# Patient Record
Sex: FEMALE | Race: WHITE | NOT HISPANIC OR LATINO | Employment: FULL TIME | URBAN - METROPOLITAN AREA
[De-identification: names, ages, dates, MRNs, and addresses within clinical notes are randomized per-mention and may not be internally consistent; named-entity substitution may affect disease eponyms.]

---

## 2017-06-04 ENCOUNTER — TRANSFERRED RECORDS (OUTPATIENT)
Dept: HEALTH INFORMATION MANAGEMENT | Facility: CLINIC | Age: 52
End: 2017-06-04

## 2017-06-22 DIAGNOSIS — F33.0 MAJOR DEPRESSIVE DISORDER, RECURRENT EPISODE, MILD (H): ICD-10-CM

## 2017-06-22 NOTE — TELEPHONE ENCOUNTER
sertraline (ZOLOFT) 100 MG tablet     Last Written Prescription Date: 9/8/16  Last Fill Quantity: 90, # refills: 1  Last Office Visit with [facility] primary care provider:  9/8/16        Last PHQ-9 score on record=   PHQ-9 SCORE 9/8/2016   Total Score -   Total Score 4

## 2017-06-26 RX ORDER — SERTRALINE HYDROCHLORIDE 100 MG/1
100 TABLET, FILM COATED ORAL DAILY
Qty: 30 TABLET | Refills: 0 | Status: SHIPPED | OUTPATIENT
Start: 2017-06-26 | End: 2017-08-25

## 2017-06-26 NOTE — TELEPHONE ENCOUNTER
Routing refill request to provider for review/approval because:  Patient needs to be seen because: overdue for mood follow up  Appears to be a break in medication - should have been out in March    Sugar Francois, RN, BSN

## 2017-07-12 DIAGNOSIS — I10 HTN, GOAL BELOW 140/80: ICD-10-CM

## 2017-07-12 DIAGNOSIS — I10 BENIGN HYPERTENSION: ICD-10-CM

## 2017-07-13 NOTE — TELEPHONE ENCOUNTER
hydrochlorothiazide (HYDRODIURIL) 25 MG tablet      Last Written Prescription Date: 11/28/16  Last Fill Quantity: 90, # refills: 1  Last Office Visit with [facility], [facility] or [facility] prescribing provider: 9/8/16       Potassium   Date Value Ref Range Status   06/08/2016 3.7 3.4 - 5.3 mmol/L Final     Creatinine   Date Value Ref Range Status   06/08/2016 0.90 0.52 - 1.04 mg/dL Final     BP Readings from Last 3 Encounters:   09/08/16 118/88   08/02/16 136/76   06/08/16 110/76

## 2017-07-14 ENCOUNTER — TELEPHONE (OUTPATIENT)
Dept: FAMILY MEDICINE | Facility: OTHER | Age: 52
End: 2017-07-14

## 2017-07-14 RX ORDER — HYDROCHLOROTHIAZIDE 25 MG/1
TABLET ORAL
Qty: 90 TABLET | Refills: 1 | Status: SHIPPED | OUTPATIENT
Start: 2017-07-14 | End: 2018-03-25

## 2017-07-14 NOTE — TELEPHONE ENCOUNTER
hydrochlorothiazide (HYDRODIURIL) 25 MG tablet  Routing refill request to provider for review/approval because:  Labs not current: BMP  A break in medication, should have needed refills around 05/2017  Patient needs to be seen because:  Over due for Depression follow up, due for physical     Jess Syed RN, BSN

## 2017-08-10 DIAGNOSIS — I10 HTN, GOAL BELOW 140/80: ICD-10-CM

## 2017-08-10 NOTE — TELEPHONE ENCOUNTER
Lisinopril 5 mg      Last Written Prescription Date: 6/8/2016  Last Fill Quantity: 90, # refills: 1  Last Office Visit with [facility], [facility] or [facility] prescribing provider: 9/8/2016       Potassium   Date Value Ref Range Status   06/08/2016 3.7 3.4 - 5.3 mmol/L Final     Creatinine   Date Value Ref Range Status   06/08/2016 0.90 0.52 - 1.04 mg/dL Final     BP Readings from Last 3 Encounters:   09/08/16 118/88   08/02/16 136/76   06/08/16 110/76

## 2017-08-14 RX ORDER — LISINOPRIL 5 MG/1
TABLET ORAL
Qty: 90 TABLET | Refills: 1 | OUTPATIENT
Start: 2017-08-14

## 2017-08-14 NOTE — TELEPHONE ENCOUNTER
I haven't seen since 2014 and last saw KW who is on leave and this was over a year ago.  Due for follow up.  Aminta Davis MD

## 2017-08-14 NOTE — TELEPHONE ENCOUNTER
Routing refill request to provider for review/approval because:  A break in medication. Patient should have been due around 12/6/16.  Brenna Ziegler RN

## 2017-08-24 ENCOUNTER — TELEPHONE (OUTPATIENT)
Dept: FAMILY MEDICINE | Facility: OTHER | Age: 52
End: 2017-08-24

## 2017-08-24 DIAGNOSIS — I10 HTN, GOAL BELOW 140/80: ICD-10-CM

## 2017-08-25 DIAGNOSIS — F33.0 MAJOR DEPRESSIVE DISORDER, RECURRENT EPISODE, MILD (H): ICD-10-CM

## 2017-08-25 NOTE — TELEPHONE ENCOUNTER
lisinopril (PRINIVIL,ZESTRIL) 5 MG tablet      Last Written Prescription Date: 11/28/16  Last Fill Quantity: 90, # refills: 1  Last Office Visit with [facility], [facility] or [facility] prescribing provider: 9/8/16 [place]       Potassium   Date Value Ref Range Status   06/08/2016 3.7 3.4 - 5.3 mmol/L Final     Creatinine   Date Value Ref Range Status   06/08/2016 0.90 0.52 - 1.04 mg/dL Final     BP Readings from Last 3 Encounters:   09/08/16 118/88   08/02/16 136/76   06/08/16 110/76

## 2017-08-25 NOTE — TELEPHONE ENCOUNTER
sertraline (ZOLOFT) 100 MG tablet     Last Written Prescription Date: 6/26/2017  Last Fill Quantity: 30, # refills: 0  Last Office Visit with [facility] primary care provider:  9/8/2016        Last PHQ-9 score on record=   PHQ-9 SCORE 9/8/2016   Total Score -   Total Score 4

## 2017-08-28 RX ORDER — LISINOPRIL 5 MG/1
TABLET ORAL
Qty: 90 TABLET | Refills: 1 | OUTPATIENT
Start: 2017-08-28

## 2017-08-28 NOTE — TELEPHONE ENCOUNTER
Already denied once.  Please review first if you want to call and get her scheduled.  Aminta Davis MD

## 2017-08-28 NOTE — TELEPHONE ENCOUNTER
Routing refill request to provider for review/approval because:  Joyce given x1 and patient did not follow up, please advise  Patient needs to be seen because:  Overdue for mood follow up    Sugar Francois, RN, BSN

## 2017-08-28 NOTE — TELEPHONE ENCOUNTER
Routing refill request to provider for review/approval because:  A break in medication. Refill should have been due on 5/28/17. Brenna Ziegler RN

## 2017-09-05 RX ORDER — SERTRALINE HYDROCHLORIDE 100 MG/1
100 TABLET, FILM COATED ORAL DAILY
Qty: 30 TABLET | Refills: 0 | Status: SHIPPED | OUTPATIENT
Start: 2017-09-05 | End: 2017-10-05

## 2017-10-02 DIAGNOSIS — I10 HTN, GOAL BELOW 140/80: ICD-10-CM

## 2017-10-02 RX ORDER — LISINOPRIL 5 MG/1
TABLET ORAL
Qty: 90 TABLET | Refills: 1 | Status: CANCELLED | OUTPATIENT
Start: 2017-10-02

## 2017-10-02 NOTE — TELEPHONE ENCOUNTER
lisinopril      Last Written Prescription Date: 11/28/16  Last Fill Quantity: 90, # refills: 1  Last Office Visit with [facility], [facility] or [facility] prescribing provider: 09/08/16  Next 5 appointments (look out 90 days)     Oct 05, 2017 12:00 PM CDT   Office Visit with Aminta Davis MD   [facility]    [address] [place] [id number] [phone]                   Potassium   Date Value Ref Range Status   06/08/2016 3.7 3.4 - 5.3 mmol/L Final     Creatinine   Date Value Ref Range Status   06/08/2016 0.90 0.52 - 1.04 mg/dL Final     BP Readings from Last 3 Encounters:   09/08/16 118/88   08/02/16 136/76   06/08/16 110/76

## 2017-10-03 NOTE — PROGRESS NOTES
SUBJECTIVE:                                                    Antoinette Sherwood is a 51 year old female who presents to clinic today for the following health issues:    History of Present Illness   Depression & Anxiety Follow-up:     Depression/Anxiety:  Depression only    Status since last visit::  Stable    Other associated symptoms of depression::  None    Significant life event::  YES    Current substance use::  None    Anxiety/Panic symptoms::  No  Diet::  Regular (no restrictions) and Low salt  Frequency of exercise::  2-3 days/week  Duration of exercise::  45-60 minutes  Taking medications regularly::  Yes  Medication side effects::  None  Additional concerns today::  No    Pt. A depression meds. F/u only.    Mood: patient reports good control of her mood at this time. The patient reports that she is having some sleep disturbances from her Zoloft.     General: the patient is working at [employer] going back and forth between [employer] and [place]. She does the infusion part for Crohn's and colitis.     Hypertension:  BP Readings from Last 3 Encounters:   10/05/17 118/70   09/08/16 118/88   08/02/16 136/76     The patient denies any side effects to her hypertension medications.     Lifestyle: the patient reports that she is exercising 3 times a week for about 1-1.5 hours.     Left Hip: the patient reports she has intermittent, sharp hip pain going from sitting to standing that radiates down her left leg. She reports that her pain is worst in the morning.     Problem list and histories reviewed & adjusted, as indicated.  Additional history: as documented    Patient Active Problem List   Diagnosis     CARDIOVASCULAR SCREENING; LDL GOAL LESS THAN 160     Mild major depression (H)     Benign hypertension     HTN, goal below 140/80     Pain of left lower leg     Migraine with aura and without status migrainosus, not intractable     Past Surgical History:   Procedure Laterality Date     C NONSPECIFIC  PROCEDURE  08/96    Left salpingectomy   Abstracted 07/01/02     [facility] AFF SURGICAL PATHOLOGY  2006    breast reduction     EXCISE LESION TRUNK  11/21/2012    Procedure: EXCISE LESION TRUNK;  Excision back mass;  Surgeon: Saqib Abraham MD;  Location: [facility] OR     HC CORRECT BUNION,METATARSAL OSTEOTOMY  12/03/09    right great toe     HC EXCISION LESION/TENDON-SHEATH/CAPSULE, FOOT  12/03/09     HC REMV TARSAL/METATARSAL BENIGN BONE LESN  12/03/09     HYSTERECTOMY, PAP NO LONGER INDICATED       HYSTERECTOMY, VAGINAL  2005    menorrhagia, normal paps     LAPAROSCOPIC CHOLECYSTECTOMY  2001     LITHOTRIPSY  2007    right kidney     TUBAL LIGATION  2001       Social History   Substance Use Topics     Smoking status: Never Smoker     Smokeless tobacco: Never Used     Alcohol use Yes      Comment: occ glass of wine     Family History   Problem Relation Age of Onset     Thyroid Disease Father      Hypertension Father      Lipids Father      Eye Disorder Mother      glaucoma     DIABETES Mother      Genitourinary Problems Mother      kidney stones     Asthma No family hx of      C.A.D. No family hx of      CEREBROVASCULAR DISEASE No family hx of      Breast Cancer No family hx of      Cancer - colorectal No family hx of      Prostate Cancer No family hx of      Alzheimer Disease No family hx of      Arthritis No family hx of      Blood Disease No family hx of      CANCER No family hx of      Cardiovascular No family hx of      Circulatory No family hx of      GASTROINTESTINAL DISEASE No family hx of      HEART DISEASE No family hx of      Musculoskeletal Disorder No family hx of      Neurologic Disorder No family hx of      Respiratory No family hx of          ROS:  Constitutional, HEENT, cardiovascular, pulmonary, GI, , musculoskeletal, neuro, skin, endocrine and psych systems are negative, except as in HPI or otherwise noted.     This document serves as a record of the services and decisions personally performed and made by  "Aminta Davis MD. It was created on her behalf by Edward Norman, a trained medical scribe. The creation of this document is based the provider's statements to the medical scribe.  Edward Norman, October 5, 2017 12:15 PM     OBJECTIVE:                                                    /70  Pulse 72  Temp 98  F (36.7  C) (Temporal)  Resp 16  Ht 1.67 m (5' 5.75\")  Wt 83 kg (183 lb)  LMP 07/01/2005  SpO2 97%  BMI 29.76 kg/m2  Body mass index is 29.76 kg/(m^2).     GENERAL: healthy, alert, well nourished, well hydrated, no distress, overweight  RESP: lungs clear to auscultation - no rales, no rhonchi, no wheezes  CV: regular rates and rhythm, normal S1 S2, no S3 or S4 and no murmur, no click or rub -  MS: extremities- no gross deformities noted, no edema (See Hip exams below)  SKIN: no suspicious lesions, no rashes to visible skin   PSYCH: Alert and oriented times 3; speech- coherent , normal rate and volume; able to articulate logical thoughts, able to abstract reason, no tangential thoughts, no hallucinations or delusions, affect- normal    Left Hip Exam   Gait: Normal.    Tenderness   The patient is experiencing tenderness in the greater trochanter.    Range of Motion   Extension:            Normal  Flexion:                 Normal  Internal Rotation:  Abnormal  External Rotation: Abnormal  Abduction:            Normal  Adduction:            Abnormal    Comments:  Int/Ext rotation painful at trochanter area.     Right Hip Exam   Gait: Normal.    Range of Motion   Normal right hip ROM    Muscle Strength   Normal right hip strength      Diagnostic test results:  No results found for this or any previous visit (from the past 24 hour(s)).     ASSESSMENT/PLAN:                                                      ICD-10-CM    1. HTN, goal below 140/80 I10 BASIC METABOLIC PANEL     lisinopril (PRINIVIL/ZESTRIL) 5 MG tablet   2. Need for prophylactic vaccination and inoculation against influenza Z23    3. Major " depressive disorder, recurrent episode, mild (H) F33.0 sertraline (ZOLOFT) 50 MG tablet     buPROPion (WELLBUTRIN XL) 150 MG 24 hr tablet   4. CARDIOVASCULAR SCREENING; LDL GOAL LESS THAN 160 Z13.6 Lipid panel reflex to direct LDL   5. Iliotibial band syndrome, left M76.32 Pain Drug Scr UR W Rptd Meds   6. Pain in joint involving pelvic region and thigh, right M25.551 traMADol (ULTRAM) 50 MG tablet     Mood: due to the patient's side effect of Zoloft dosage, will decrease Zoloft dosage from 100 mg to 50 mg and add Wellbutrin 150 mg to medication regiment.      Hypertension: the patient's blood pressure is well controlled at this time. Will continue current lisinopril regiment. Lisinopril refilled.      Left Hip pain: the patient is informed that her pain is related to a muscular problem in the IT band area, likely from overuse. She is advised to stretch and perform foam roller therapy the area to help the pain. If no relief, the patient is advised to pursue physical therapy. Patient is prescribed Tramadol, as needed, for hip pain. Controlled substance agreement discussed and signed by the patient. Order placed for Tramadol. Medication direction, dosage, and side effects discussed with patient.    Screenings: patient is encouraged to schedule her Q2 year mammogram soon.     Order placed for labs that the patient will complete at a later date.     Vaccination(s) administered: none, patient defers    Patient Instructions   3 month mood follow-up.     The information in this document, created by the medical scribe for me, accurately reflects the services I personally performed and the decisions made by me. I have reviewed and approved this document for accuracy.   MD Aminta Ortega MD, MD  [facility]

## 2017-10-05 ENCOUNTER — OFFICE VISIT (OUTPATIENT)
Dept: FAMILY MEDICINE | Facility: OTHER | Age: 52
End: 2017-10-05
Payer: COMMERCIAL

## 2017-10-05 VITALS
OXYGEN SATURATION: 97 % | RESPIRATION RATE: 16 BRPM | TEMPERATURE: 98 F | BODY MASS INDEX: 29.41 KG/M2 | HEART RATE: 72 BPM | HEIGHT: 66 IN | SYSTOLIC BLOOD PRESSURE: 118 MMHG | WEIGHT: 183 LBS | DIASTOLIC BLOOD PRESSURE: 70 MMHG

## 2017-10-05 DIAGNOSIS — M25.551 PAIN IN JOINT INVOLVING PELVIC REGION AND THIGH, RIGHT: ICD-10-CM

## 2017-10-05 DIAGNOSIS — Z13.6 CARDIOVASCULAR SCREENING; LDL GOAL LESS THAN 160: ICD-10-CM

## 2017-10-05 DIAGNOSIS — I10 HTN, GOAL BELOW 140/80: Primary | ICD-10-CM

## 2017-10-05 DIAGNOSIS — M76.32 ILIOTIBIAL BAND SYNDROME, LEFT: ICD-10-CM

## 2017-10-05 DIAGNOSIS — F33.0 MAJOR DEPRESSIVE DISORDER, RECURRENT EPISODE, MILD (H): ICD-10-CM

## 2017-10-05 DIAGNOSIS — Z23 NEED FOR PROPHYLACTIC VACCINATION AND INOCULATION AGAINST INFLUENZA: ICD-10-CM

## 2017-10-05 PROCEDURE — 99000 SPECIMEN HANDLING OFFICE-LAB: CPT | Performed by: FAMILY MEDICINE

## 2017-10-05 PROCEDURE — 99214 OFFICE O/P EST MOD 30 MIN: CPT | Performed by: FAMILY MEDICINE

## 2017-10-05 PROCEDURE — 80307 DRUG TEST PRSMV CHEM ANLYZR: CPT | Mod: 90 | Performed by: FAMILY MEDICINE

## 2017-10-05 RX ORDER — TRAMADOL HYDROCHLORIDE 50 MG/1
50 TABLET ORAL EVERY 6 HOURS PRN
Qty: 20 TABLET | Refills: 0 | Status: SHIPPED | OUTPATIENT
Start: 2017-10-05 | End: 2018-02-26

## 2017-10-05 RX ORDER — LISINOPRIL 5 MG/1
5 TABLET ORAL DAILY
Qty: 90 TABLET | Refills: 3 | Status: SHIPPED | OUTPATIENT
Start: 2017-10-05 | End: 2018-09-23

## 2017-10-05 RX ORDER — BUPROPION HYDROCHLORIDE 150 MG/1
150 TABLET ORAL EVERY MORNING
Qty: 30 TABLET | Refills: 1 | Status: SHIPPED | OUTPATIENT
Start: 2017-10-05 | End: 2018-02-21

## 2017-10-05 ASSESSMENT — PATIENT HEALTH QUESTIONNAIRE - PHQ9
SUM OF ALL RESPONSES TO PHQ QUESTIONS 1-9: 1
SUM OF ALL RESPONSES TO PHQ QUESTIONS 1-9: 1

## 2017-10-05 ASSESSMENT — ANXIETY QUESTIONNAIRES
GAD7 TOTAL SCORE: 0
6. BECOMING EASILY ANNOYED OR IRRITABLE: NOT AT ALL
GAD7 TOTAL SCORE: 0
7. FEELING AFRAID AS IF SOMETHING AWFUL MIGHT HAPPEN: NOT AT ALL
7. FEELING AFRAID AS IF SOMETHING AWFUL MIGHT HAPPEN: NOT AT ALL
4. TROUBLE RELAXING: NOT AT ALL
1. FEELING NERVOUS, ANXIOUS, OR ON EDGE: NOT AT ALL
3. WORRYING TOO MUCH ABOUT DIFFERENT THINGS: NOT AT ALL
GAD7 TOTAL SCORE: 0
5. BEING SO RESTLESS THAT IT IS HARD TO SIT STILL: NOT AT ALL
2. NOT BEING ABLE TO STOP OR CONTROL WORRYING: NOT AT ALL

## 2017-10-05 ASSESSMENT — PAIN SCALES - GENERAL: PAINLEVEL: NO PAIN (0)

## 2017-10-05 NOTE — LETTER
[facility]    10/05/17    Patient: Antoinette Sherwood  YOB: 1965  Medical Record Number: [id number]                                                                  Controlled Substance Agreement  I understand that my care provider has prescribed controlled substances (narcotics, tranquilizers, and/or stimulants) to help manage my condition(s).  I am taking this medicine to help me function or work.  I know that this is strong medicine.  It could have serious side effects and even cause a dependency on the drug.  If I stop these medicines suddenly, I could have severe withdrawal symptoms.    The risks, benefits, and side effects of these medication(s) were explained to me.  I agree that:  1. I will take part in other treatments as advised by my provider.  This may be psychiatry or counseling, physical therapy, behavioral therapy, group treatment, or a referral to a pain clinic.  I will reduce or stop my medicine when my provider tells me to do so.   2. I will take my medicines as prescribed.  I will not change the dose or schedule unless my provider tells me to.  There will be no refills if I  run out early.   I may be contacted at any time without warning and asked to complete a drug test or pill count.   3. I will keep all my appointments at the clinic.  If I miss appointments or fail to follow instructions, my provider may stop my medicine.  4. I will not ask other providers to prescribe controlled substances. And I will not accept controlled substances from other people. If I need another prescribed controlled substance for a new reason, I will notify my provider within one business day.  5. If I enroll in the [place] Medical Marijuana program, I will tell my provider.  I will also sign an agreement to share my medical records with my provider.  6. I will use one pharmacy to fill all of my controlled substance prescriptions.  If my prescription is mailed to my pharmacy, it  may take 5 to 7 days for my medicine to be ready.  7. I understand that my provider, clinic care team, and pharmacy can track controlled substance prescriptions from other providers through a central database (prescription monitoring program).  8. I will bring in my list of medications (or my medicine bottles) each time I come to the clinic.  REV-  04/2016                                                                                                                                            Page 1 of 2      [facility] [place]    10/05/17    Patient: Antoinette Sherwood  YOB: 1965  Medical Record Number: [id number]    9. Refills of controlled substances will be made only during office hours.  It is up to me to make sure that I do not run out of my medicines on weekends or holidays.    10. I am responsible for my prescriptions.  If the medicine is lost or stolen, it will not be replaced.   I also agree not to share these medicines with anyone.  11. I agree to not use ANY illegal or recreational drugs.  This includes marijuana, cocaine, bath salts or other drugs.  I agree not to use alcohol unless my provider says I may.  I agree to give urine samples whenever asked.  If I fail to give a urine sample, the provider may stop my medicine.     12. I will tell my nurse or provider right away if I become pregnant or have a new medical problem treated outside of [facility].  13. I understand that this medicine can affect my thinking and judgment.  It may be unsafe for me to drive, use machinery and do dangerous tasks.  I will not do any of these things until I know how the medicine affects me.  If my dose changes, I will wait to see how it affects me.  I will contact my provider if I have concerns about medicine side effects.  I understand that if I do not follow any of the conditions above, my prescriptions or treatment may be stopped.    I agree that my provider, clinic care team, and  pharmacy may work with any city, state or federal law enforcement agency that investigates the misuse, sale, or other diversion of my controlled medicine. I will allow my provider to discuss my care with or share a copy of this agreement with any other treating provider, pharmacy or emergency room where I receive care.  I agree to give up (waive) any right of privacy or confidentiality with respect to these authorizations.   I have read this agreement and have asked questions about anything I did not understand.   ___________________________________    ___________________________  Patient Signature                                                           Date and Time  ___________________________________     ____________________________  Witness                                                                            Date and Time  ___________________________________  Aminta Davis MD, MD  REV-  04/2016                                                                                                                                                                 Page 2 of 2  Opioid Pain Medicines (also known as Narcotics)  What You Need to Know      What are opioids?   Opioids are pain medicines that must be prescribed by a doctor. Examples are:     morphine (MS Contin, Naomie)    oxycodone (Oxycontin)    oxycodone and acetaminophen (Percocet)    hydrocodone and acetaminophen (Vicodin, Norco)     fentanyl patch (Duragesic)     hydromorphone (Dilaudid)     methadone     What do opioids do well?   Opioids are best for short-term pain after a surgery or injury. They also work well for cancer pain. Unlike other pain medicines, they do not cause liver or kidney failure or ulcers. They may help some people with long-lasting (chronic) pain.     What do opioids NOT do well?   Opioids never get rid of pain entirely, and they do not work well for most patients with chronic pain. Opioids do not reduce swelling, one of the causes  of pain. They also don t work well for nerve pain.     Side effects  Talk to your doctor before you start or decide to keep taking one of these medicines. Side effects include:    Lowers your breathing rate enough that it could cause death    Death due to taking more than the prescribed dose    Serious lifelong opioid use      Dependence is not the same as addiction. Addiction is when people keep using a substance that harms their body, their mind or their relations with others. If you have a history of drug or alcohol abuse, taking opioids can cause a relapse.  Over time, opioids don t work as well. Most people will need higher and higher doses. The higher the dose, the more serious the side effects. We don t know the long-term effects of opioids.   People who have used opioids for a long time have a lower quality of life, worse depression, higher levels of pain and more visits to doctors.  Overdose from prescription drugs is the second leading cause of death in the [place] The risk of overdose rises when opioids are taken with other drugs such as:    Medicines used for anxiety and panic attacks (such as lorazepam, alprazolam, clonazepam    Other sedatives    Alcohol    Illegal drugs such as heroin  Never share your opioids with others. Be sure to store opioids in a secure place, locked if possible.Young children can easily swallow them and overdose.     Are there other ways to manage pain?  Ways to help reduce pain:    Exercise every day.    Treat health problems that may be causing pain.    Treat mental health problems like depression and anxiety.     Worse depression symptoms; Less pleasure in things you usually enjoy    Feeling tired or sluggish    Slower thoughts or cloudy thinking    Being more sensitive to pain over time; Pain is harder to control.    Trouble sleeping or restless sleep    Changes in hormone levels (for example, less testosterone).     Changes in sex drive or ability to have sex    Long lasting  nausea and constipation    Trouble breathing while asleep; This is worse with lung problems like COPD or sleep apnea.    Unsafe driving    Getting sick more often    Itching    Feeling dizzy    Dry mouth    Sweating    Trouble emptying the bladder (peeing). This is worse if you have an enlarged prostate or get urinary tract infections (UTIs).    What else should I know about opioids?  When someone takes opioids for too long or too often, they become dependent. This means that if you stop or reduce the medicine too quickly, you will have withdrawal symptoms.          Practice good sleep habits.  Try to go to bed and get up at the same time every day.    Stop smoking.  Tobacco use can make pain worse.    Do things that you enjoy.    Find a way to work through pain without drugs.  Try deep breathing, meditation, visual imagery and aromatherapy.    Ask your doctor to help you create a plan to manage your pain.

## 2017-10-05 NOTE — TELEPHONE ENCOUNTER
lisinopril (PRINIVIL,ZESTRIL) 5 MG tablet  Routing refill request to provider for review/approval because:  Labs not current:  Potassium, creatinine  A break in medication, should have needed refills around 06/2017  Patient needs to be seen because:  Due for OV, scheduled for today.  Next 5 appointments (look out 90 days)     Oct 05, 2017 12:00 PM CDT   Office Visit with Aminta Davis MD   [facility]    [address] [place] [id number]   [phone]                Medication pended to OV. Jess Syed, RN, BSN

## 2017-10-05 NOTE — NURSING NOTE
"Chief Complaint   Patient presents with     Recheck Medication     Panel Management     flu, DAP, PHQ9, BMP       Initial BP (!) 118/17  Pulse 72  Temp 98  F (36.7  C) (Temporal)  Resp 16  Ht 5' 5.75\" (1.67 m)  Wt 183 lb (83 kg)  LMP 07/01/2005  SpO2 97%  BMI 29.76 kg/m2 Estimated body mass index is 29.76 kg/(m^2) as calculated from the following:    Height as of this encounter: 5' 5.75\" (1.67 m).    Weight as of this encounter: 183 lb (83 kg).  Medication Reconciliation: complete   Adam Kahn      "

## 2017-10-05 NOTE — LETTER
My Depression Action Plan  Name: Antoinette Sherwood   Date of Birth 1965  Date: 10/3/2017    My doctor: No primary care provider on file.   My clinic: [address]  [facility] [id number] [phone]          GREEN    ZONE   Good Control    What it looks like:     Things are going generally well. You have normal up s and down s. You may even feel depressed from time to time, but bad moods usually last less than a day.   What you need to do:  1. Continue to care for yourself (see self care plan)  2. Check your depression survival kit and update it as needed  3. Follow your physician s recommendations including any medication.  4. Do not stop taking medication unless you consult with your physician first.           YELLOW         ZONE Getting Worse    What it looks like:     Depression is starting to interfere with your life.     It may be hard to get out of bed; you may be starting to isolate yourself from others.    Symptoms of depression are starting to last most all day and this has happened for several days.     You may have suicidal thoughts but they are not constant.   What you need to do:     1. Call your care team, your response to treatment will improve if you keep your care team informed of your progress. Yellow periods are signs an adjustment may need to be made.     2. Continue your self-care, even if you have to fake it!    3. Talk to someone in your support network    4. Open up your depression survival kit           RED    ZONE Medical Alert - Get Help    What it looks like:     Depression is seriously interfering with your life.     You may experience these or other symptoms: You can t get out of bed most days, can t work or engage in other necessary activities, you have trouble taking care of basic hygiene, or basic responsibilities, thoughts of suicide or death that will not go away, self-injurious behavior.     What you need to  do:  1. Call your care team and request a same-day appointment. If they are not available (weekends or after hours) call your local crisis line, emergency room or 911.      Electronically signed by: Mary Kay Ring, October 3, 2017    Depression Self Care Plan / Survival Kit    Self-Care for Depression  Here s the deal. Your body and mind are really not as separate as most people think.  What you do and think affects how you feel and how you feel influences what you do and think. This means if you do things that people who feel good do, it will help you feel better.  Sometimes this is all it takes.  There is also a place for medication and therapy depending on how severe your depression is, so be sure to consult with your medical provider and/ or Behavioral Health Consultant if your symptoms are worsening or not improving.     In order to better manage my stress, I will:    Exercise  Get some form of exercise, every day. This will help reduce pain and release endorphins, the  feel good  chemicals in your brain. This is almost as good as taking antidepressants!  This is not the same as joining a gym and then never going! (they count on that by the way ) It can be as simple as just going for a walk or doing some gardening, anything that will get you moving.      Hygiene   Maintain good hygiene (Get out of bed in the morning, Make your bed, Brush your teeth, Take a shower, and Get dressed like you were going to work, even if you are unemployed).  If your clothes don't fit try to get ones that do.    Diet  I will strive to eat foods that are good for me, drink plenty of water, and avoid excessive sugar, caffeine, alcohol, and other mood-altering substances.  Some foods that are helpful in depression are: complex carbohydrates, B vitamins, flaxseed, fish or fish oil, fresh fruits and vegetables.    Psychotherapy  I agree to participate in Individual Therapy (if recommended).    Medication  If prescribed medications, I agree  to take them.  Missing doses can result in serious side effects.  I understand that drinking alcohol, or other illicit drug use, may cause potential side effects.  I will not stop my medication abruptly without first discussing it with my provider.    Staying Connected With Others  I will stay in touch with my friends, family members, and my primary care provider/team.    Use your imagination  Be creative.  We all have a creative side; it doesn t matter if it s oil painting, sand castles, or mud pies! This will also kick up the endorphins.    Witness Beauty  (AKA stop and smell the roses) Take a look outside, even in mid-winter. Notice colors, textures. Watch the squirrels and birds.     Service to others  Be of service to others.  There is always someone else in need.  By helping others we can  get out of ourselves  and remember the really important things.  This also provides opportunities for practicing all the other parts of the program.    Humor  Laugh and be silly!  Adjust your TV habits for less news and crime-drama and more comedy.    Control your stress  Try breathing deep, massage therapy, biofeedback, and meditation. Find time to relax each day.     My support system    Clinic Contact:  Phone number:    Contact 1:  Phone number:    Contact 2:  Phone number:    Confucianist/:  Phone number:    Therapist:  Phone number:    Local crisis center:    Phone number:    Other community support:  Phone number:

## 2017-10-05 NOTE — MR AVS SNAPSHOT
After Visit Summary   10/5/2017    Antoinette Sherwood    MRN: [id number]           Patient Information     Date Of Birth          1965        Visit Information        Provider Department      10/5/2017 12:00 PM Aminta Davis MD [facility]        Today's Diagnoses     HTN, goal below 140/80    -  1    Need for prophylactic vaccination and inoculation against influenza        Major depressive disorder, recurrent episode, mild (H)        CARDIOVASCULAR SCREENING; LDL GOAL LESS THAN 160        Iliotibial band syndrome, left        Pain in joint involving pelvic region and thigh, right          Care Instructions    3 month mood follow-up.           Follow-ups after your visit        Follow-up notes from your care team     Return in about 3 months (around 1/5/2018) for Mood Check.      Future tests that were ordered for you today     Open Future Orders        Priority Expected Expires Ordered    Lipid panel reflex to direct LDL Routine  11/5/2017 10/5/2017    BASIC METABOLIC PANEL Routine  11/5/2017 10/5/2017            Who to contact     If you have questions or need follow up information about today's clinic visit or your schedule please contact [facility] directly at [phone].  Normal or non-critical lab and imaging results will be communicated to you by Tuicoolhart, letter or phone within 4 business days after the clinic has received the results. If you do not hear from us within 7 days, please contact the clinic through eReceiptst or phone. If you have a critical or abnormal lab result, we will notify you by phone as soon as possible.  Submit refill requests through Switchfly or call your pharmacy and they will forward the refill request to us. Please allow 3 business days for your refill to be completed.          Additional Information About Your Visit        Tuicoolhart Information     Switchfly gives you secure access to your electronic health record. If you see a  "primary care provider, you can also send messages to your care team and make appointments. If you have questions, please call your primary care clinic.  If you do not have a primary care provider, please call [phone] and they will assist you.        Care EveryWhere ID     This is your Care EveryWhere ID. This could be used by other organizations to access your [place] medical records  [id number]        Your Vitals Were     Pulse Temperature Respirations Height Last Period Pulse Oximetry    72 98  F (36.7  C) (Temporal) 16 5' 5.75\" (1.67 m) 07/01/2005 97%    BMI (Body Mass Index)                   29.76 kg/m2            Blood Pressure from Last 3 Encounters:   10/05/17 118/70   09/08/16 118/88   08/02/16 136/76    Weight from Last 3 Encounters:   10/05/17 183 lb (83 kg)   09/08/16 180 lb 4.8 oz (81.8 kg)   08/02/16 180 lb 8 oz (81.9 kg)              We Performed the Following     Pain Drug Scr UR W Rptd Meds          Today's Medication Changes          These changes are accurate as of: 10/5/17 12:42 PM.  If you have any questions, ask your nurse or doctor.               Start taking these medicines.        Dose/Directions    buPROPion 150 MG 24 hr tablet   Commonly known as:  WELLBUTRIN XL   Used for:  Major depressive disorder, recurrent episode, mild (H)   Started by:  Aminta Davis MD        Dose:  150 mg   Take 1 tablet (150 mg) by mouth every morning   Quantity:  30 tablet   Refills:  1         These medicines have changed or have updated prescriptions.        Dose/Directions    lisinopril 5 MG tablet   Commonly known as:  PRINIVIL/ZESTRIL   This may have changed:  See the new instructions.   Used for:  HTN, goal below 140/80   Changed by:  Aminta Davis MD        Dose:  5 mg   Take 1 tablet (5 mg) by mouth daily   Quantity:  90 tablet   Refills:  3       sertraline 50 MG tablet   Commonly known as:  ZOLOFT   This may have changed:    - medication strength  - how much to take  - additional " instructions   Used for:  Major depressive disorder, recurrent episode, mild (H)   Changed by:  Aminta Davis MD        Dose:  50 mg   Take 1 tablet (50 mg) by mouth daily   Quantity:  90 tablet   Refills:  0            Where to get your medicines      These medications were sent to [place] [id number] [place] [facility] [place] [facility] [address]  [address], [facility] [address]     Phone:  [phone]     buPROPion 150 MG 24 hr tablet    lisinopril 5 MG tablet    sertraline 50 MG tablet         Some of these will need a paper prescription and others can be bought over the counter.  Ask your nurse if you have questions.     Bring a paper prescription for each of these medications     traMADol 50 MG tablet                Primary Care Provider Office Phone # Fax #    Aminta Davis [phone]       [address] [place] [address]        Equal Access to Services     [facility]: Hadii destinee bahena hadasho Soventura, waaxda luqadaha, qaybta kaalmada adeemelyyada, jame sifuentes . So [facility] [phone].    ATENCIÓN: Si habla español, tiene a palacios disposición servicios gratuitos de asistencia lingüística. Llame al [phone].    We comply with applicable federal civil rights laws and [place] laws. We do not discriminate on the basis of race, color, national origin, age, disability, sex, sexual orientation, or gender identity.            Thank you!     Thank you for choosing [facility]  for your care. Our goal is always to provide you with excellent care. Hearing back from our patients is one way we can continue to improve our services. Please take a few minutes to complete the written survey that you may receive in the mail after your visit with us. Thank you!             Your Updated Medication List - Protect others around you: Learn how to safely use, store and throw away your medicines at www.disposemymeds.org.          This list is accurate as of: 10/5/17 12:42 PM.   Always use your most recent med list.                   Brand Name Dispense Instructions for use Diagnosis    aspirin 81 MG chewable tablet     1 tablet    Take 1 tablet (81 mg) by mouth daily    Benign essential hypertension       buPROPion 150 MG 24 hr tablet    WELLBUTRIN XL    30 tablet    Take 1 tablet (150 mg) by mouth every morning    Major depressive disorder, recurrent episode, mild (H)       CALCIUM 600 + D 600-200 MG-UNIT Tabs     3 MONTHS    takes two tabs per day    Routine gynecological examination       CENTRUM SILVER per tablet      1 TABLET DAILY        cholecalciferol 1000 UNIT tablet    vitamin D     Take 2 tablets by mouth daily.        hydrochlorothiazide 25 MG tablet    HYDRODIURIL    90 tablet    TAKE 1 TABLET (25 MG) BY MOUTH DAILY    HTN, goal below 140/80, Benign hypertension       ibuprofen 600 MG tablet    IBU    60 tablet    Take 1 tablet by mouth every 6 hours as needed for pain.    Migraine       lisinopril 5 MG tablet    PRINIVIL/ZESTRIL    90 tablet    Take 1 tablet (5 mg) by mouth daily    HTN, goal below 140/80       magnesium 500 MG Tabs      Take  by mouth.        melatonin 3 MG tablet     90 tablet    Take 2 tablets (6 mg) by mouth nightly as needed for sleep    Insomnia       OMEGA-3 CF 1000 MG Caps       Routine gynecological examination       PEPCID AC PO      None Entered        rizatriptan 10 MG tablet    MAXALT    18 tablet    Take 1 tablet (10 mg) by mouth at onset of headache for migraine May repeat dose in 2 hours.  Do not exceed 30 mg in 24 hours    Migraine with aura and with status migrainosus, not intractable       sertraline 50 MG tablet    ZOLOFT    90 tablet    Take 1 tablet (50 mg) by mouth daily    Major depressive disorder, recurrent episode, mild (H)       traMADol 50 MG tablet    ULTRAM    20 tablet    Take 1 tablet (50 mg) by mouth every 6 hours as needed for moderate pain    Pain in joint involving pelvic region and thigh, right       vitamin E 1000 UNIT  capsule    TOCOPHEROL     Take 3 capsules by mouth daily.

## 2017-10-06 ASSESSMENT — PATIENT HEALTH QUESTIONNAIRE - PHQ9: SUM OF ALL RESPONSES TO PHQ QUESTIONS 1-9: 1

## 2017-10-06 ASSESSMENT — ANXIETY QUESTIONNAIRES: GAD7 TOTAL SCORE: 0

## 2017-10-09 DIAGNOSIS — I10 HTN, GOAL BELOW 140/80: ICD-10-CM

## 2017-10-09 DIAGNOSIS — Z13.6 CARDIOVASCULAR SCREENING; LDL GOAL LESS THAN 160: ICD-10-CM

## 2017-10-09 LAB
ANION GAP SERPL CALCULATED.3IONS-SCNC: 13 MMOL/L (ref 3–14)
BUN SERPL-MCNC: 20 MG/DL (ref 7–30)
CALCIUM SERPL-MCNC: 8.9 MG/DL (ref 8.5–10.1)
CHLORIDE SERPL-SCNC: 103 MMOL/L (ref 94–109)
CHOLEST SERPL-MCNC: 192 MG/DL
CO2 SERPL-SCNC: 29 MMOL/L (ref 20–32)
CREAT SERPL-MCNC: 0.92 MG/DL (ref 0.52–1.04)
GFR SERPL CREATININE-BSD FRML MDRD: 64 ML/MIN/1.7M2
GLUCOSE SERPL-MCNC: 92 MG/DL (ref 70–99)
HDLC SERPL-MCNC: 57 MG/DL
LDLC SERPL CALC-MCNC: 118 MG/DL
NONHDLC SERPL-MCNC: 135 MG/DL
POTASSIUM SERPL-SCNC: 4 MMOL/L (ref 3.4–5.3)
SODIUM SERPL-SCNC: 145 MMOL/L (ref 133–144)
TRIGL SERPL-MCNC: 87 MG/DL

## 2017-10-09 PROCEDURE — 36415 COLL VENOUS BLD VENIPUNCTURE: CPT | Performed by: FAMILY MEDICINE

## 2017-10-09 PROCEDURE — 80048 BASIC METABOLIC PNL TOTAL CA: CPT | Performed by: FAMILY MEDICINE

## 2017-10-09 PROCEDURE — 80061 LIPID PANEL: CPT | Performed by: FAMILY MEDICINE

## 2017-10-10 NOTE — PROGRESS NOTES
Antoinette, your results were all looking good.   Please let me know if you have any questions.    Aminta Davis MD

## 2017-10-13 LAB — PAIN DRUG SCR UR W RPTD MEDS: NORMAL

## 2017-10-16 DIAGNOSIS — M54.50 LOW BACK PAIN: ICD-10-CM

## 2017-10-16 PROCEDURE — 72100 X-RAY EXAM L-S SPINE 2/3 VWS: CPT

## 2018-01-20 DIAGNOSIS — F33.0 MAJOR DEPRESSIVE DISORDER, RECURRENT EPISODE, MILD (H): ICD-10-CM

## 2018-01-23 NOTE — TELEPHONE ENCOUNTER
Zoloft    Routing refill request to provider for review/approval because:  Patient needs to be seen because:  For 3 month Mood follow-up, please help pt schedule.    Anna Hansen, RN, BSN

## 2018-01-23 NOTE — TELEPHONE ENCOUNTER
Joyce refill given. Please assist patient in scheduling.     Ike Lora PA-C  [facility]

## 2018-02-14 NOTE — PROGRESS NOTES
SUBJECTIVE:   Antoinette COYLE Clayton Sherwood is a 52 year old female who presents to clinic today for the following health issues:      History of Present Illness     Depression & Anxiety Follow-up:     Depression/Anxiety:  Depression only    Status since last visit::  Stable    Other associated symptoms of depression::  None    Significant life event::  No    Current substance use::  None    Anxiety/Panic symptoms::  No       Today's PHQ-9         PHQ-9 Total Score:     (P) 3   PHQ-9 Q9 Suicidal ideation:   (P) Not at all   Thoughts of suicide or self harm:      Self-harm Plan:        Self-harm Action:          Safety concerns for self or others:       LESLIE-7 Total Score: (P) 0    Patient states she has discontinued using Wellbutrin would like to increase Zoloft from 50 mg daily back to 100 mg daily.  She initially felt her sleeping issues were due to Zoloft side effects but states that her symptoms continued after change of medication.     Problem list and histories reviewed & adjusted, as indicated.  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 so 3 days a week pt wakes at  4 45am     Accompanying signs and symptoms:  restless legs    History  Similar episodes in past:  YES  Previous evaluation/sleep study:  no     Precipitating or alleviating factors:  New stressful situation: no   Caffeine intake after lunchtime: YES- sometimes   OTC decongestants: no   Any new medications: no     Therapies tried and outcome: melatonin       BP Readings from Last 3 Encounters:   18 116/70   10/05/17 118/70   16 118/88    Wt Readings from Last 3 Encounters:   18 183 lb (83 kg)   10/05/17 183 lb (83 kg)   16 180 lb 4.8 oz (81.8 kg)                  Labs reviewed in EPIC    ROS:  Constitutional, HEENT,  "cardiovascular, pulmonary, gi and gu systems are negative, except as otherwise noted.    OBJECTIVE:     /70 (BP Location: Left arm, Patient Position: Chair, Cuff Size: Adult Large)  Pulse 68  Temp 98  F (36.7  C) (Oral)  Resp 16  Ht 5' 5.75\" (1.67 m)  Wt 183 lb (83 kg)  LMP 07/01/2005  BMI 29.76 kg/m2  Body mass index is 29.76 kg/(m^2).  GENERAL: healthy, alert and no distress  RESP: lungs clear to auscultation - no rales, rhonchi or wheezes  CV: regular rate and rhythm, normal S1 S2, no S3 or S4, no murmur, click or rub, no peripheral edema and peripheral pulses strong  PSYCH: mentation appears normal, affect normal/bright    ASSESSMENT/PLAN:     1. Major depressive disorder, recurrent episode, mild (H)  Increased zoloft to 100 mg from 50 mg daily.   - sertraline (ZOLOFT) 50 MG tablet; Take 2 tablets (100 mg) by mouth daily  Dispense: 90 tablet; Refill: 3    2. Persistent insomnia  Side effects of medications, proper use, the associated risk/benefits and other options were discussed. Patient understands these risks and agrees to take the medication as instructed.     - traZODone (DESYREL) 50 MG tablet; Take 1 tablet (50 mg) by mouth nightly as needed for sleep  Dispense: 90 tablet; Refill: 3    Patient Instructions   Please follow up with PCP in 6 months. Call if you need refill of trazodone.     Thank you  Sherry Yepez [facility]  Answers for HPI/ROS submitted by the patient on 2/21/2018   PHQ9 TOTAL SCORE: 3  LESLIE 7 TOTAL SCORE: 0    "

## 2018-02-21 ENCOUNTER — OFFICE VISIT (OUTPATIENT)
Dept: FAMILY MEDICINE | Facility: OTHER | Age: 53
End: 2018-02-21
Payer: COMMERCIAL

## 2018-02-21 VITALS
BODY MASS INDEX: 29.41 KG/M2 | SYSTOLIC BLOOD PRESSURE: 116 MMHG | WEIGHT: 183 LBS | DIASTOLIC BLOOD PRESSURE: 70 MMHG | RESPIRATION RATE: 16 BRPM | TEMPERATURE: 98 F | HEIGHT: 66 IN | HEART RATE: 68 BPM

## 2018-02-21 DIAGNOSIS — G47.00 PERSISTENT INSOMNIA: ICD-10-CM

## 2018-02-21 DIAGNOSIS — F33.0 MAJOR DEPRESSIVE DISORDER, RECURRENT EPISODE, MILD (H): Primary | ICD-10-CM

## 2018-02-21 PROCEDURE — 99213 OFFICE O/P EST LOW 20 MIN: CPT | Performed by: NURSE PRACTITIONER

## 2018-02-21 RX ORDER — TRAZODONE HYDROCHLORIDE 50 MG/1
50 TABLET, FILM COATED ORAL
Qty: 90 TABLET | Refills: 3 | Status: SHIPPED | OUTPATIENT
Start: 2018-02-21 | End: 2018-10-12

## 2018-02-21 ASSESSMENT — ANXIETY QUESTIONNAIRES
4. TROUBLE RELAXING: NOT AT ALL
3. WORRYING TOO MUCH ABOUT DIFFERENT THINGS: NOT AT ALL
1. FEELING NERVOUS, ANXIOUS, OR ON EDGE: NOT AT ALL
GAD7 TOTAL SCORE: 0
GAD7 TOTAL SCORE: 0
7. FEELING AFRAID AS IF SOMETHING AWFUL MIGHT HAPPEN: NOT AT ALL
5. BEING SO RESTLESS THAT IT IS HARD TO SIT STILL: NOT AT ALL
GAD7 TOTAL SCORE: 0
6. BECOMING EASILY ANNOYED OR IRRITABLE: NOT AT ALL
7. FEELING AFRAID AS IF SOMETHING AWFUL MIGHT HAPPEN: NOT AT ALL
2. NOT BEING ABLE TO STOP OR CONTROL WORRYING: NOT AT ALL

## 2018-02-21 ASSESSMENT — PATIENT HEALTH QUESTIONNAIRE - PHQ9
SUM OF ALL RESPONSES TO PHQ QUESTIONS 1-9: 3
SUM OF ALL RESPONSES TO PHQ QUESTIONS 1-9: 3

## 2018-02-21 NOTE — PATIENT INSTRUCTIONS
Please follow up with PCP in 6 months. Call if you need refill of trazodone.     Thank you  Sherry Yepez CNP

## 2018-02-21 NOTE — MR AVS SNAPSHOT
After Visit Summary   2/21/2018    Antoinette Sherwood    MRN: [id number]           Patient Information     Date Of Birth          1965        Visit Information        Provider Department      2/21/2018 12:00 PM Sherry Yepez APRN CNP [facility]        Today's Diagnoses     Persistent insomnia    -  1    Major depressive disorder, recurrent episode, mild (H)          Care Instructions    Please follow up with PCP in 6 months. Call if you need refill of trazodone.     Thank you  Sherry Yepez CNP            Follow-ups after your visit        Who to contact     If you have questions or need follow up information about today's clinic visit or your schedule please contact [facility] directly at [phone].  Normal or non-critical lab and imaging results will be communicated to you by MyChart, letter or phone within 4 business days after the clinic has received the results. If you do not hear from us within 7 days, please contact the clinic through Stereotypeshart or phone. If you have a critical or abnormal lab result, we will notify you by phone as soon as possible.  Submit refill requests through Dogeo or call your pharmacy and they will forward the refill request to us. Please allow 3 business days for your refill to be completed.          Additional Information About Your Visit        MyChart Information     Dogeo gives you secure access to your electronic health record. If you see a primary care provider, you can also send messages to your care team and make appointments. If you have questions, please call your primary care clinic.  If you do not have a primary care provider, please call [phone] and they will assist you.        Care EveryWhere ID     This is your Care EveryWhere ID. This could be used by other organizations to access your [place] medical records  [id number]        Your Vitals Were     Pulse Temperature Respirations Height  "Last Period BMI (Body Mass Index)    68 98  F (36.7  C) (Oral) 16 5' 5.75\" (1.67 m) 07/01/2005 29.76 kg/m2       Blood Pressure from Last 3 Encounters:   02/21/18 116/70   10/05/17 118/70   09/08/16 118/88    Weight from Last 3 Encounters:   02/21/18 183 lb (83 kg)   10/05/17 183 lb (83 kg)   09/08/16 180 lb 4.8 oz (81.8 kg)              Today, you had the following     No orders found for display         Today's Medication Changes          These changes are accurate as of 2/21/18 12:29 PM.  If you have any questions, ask your nurse or doctor.               Start taking these medicines.        Dose/Directions    traZODone 50 MG tablet   Commonly known as:  DESYREL   Used for:  Persistent insomnia   Started by:  Sherry Yepez APRN CNP        Dose:  50 mg   Take 1 tablet (50 mg) by mouth nightly as needed for sleep   Quantity:  90 tablet   Refills:  3         These medicines have changed or have updated prescriptions.        Dose/Directions    sertraline 50 MG tablet   Commonly known as:  ZOLOFT   This may have changed:  See the new instructions.   Used for:  Major depressive disorder, recurrent episode, mild (H)   Changed by:  Sherry Yepez APRN CNP        Dose:  100 mg   Take 2 tablets (100 mg) by mouth daily   Quantity:  90 tablet   Refills:  3            Where to get your medicines      These medications were sent to [facility] [address] [facility] - [place], [place] - [address] [address] [place] [address]  [address] [address] [address]     Phone:  [phone]     sertraline 50 MG tablet    traZODone 50 MG tablet                Primary Care Provider Office Phone # Fax #    Aminta Davis [phone]       [address] [place] [address]        Equal Access to Services     HEYDI BROOKS AH: Too Villarreal, lena baldwin, quinn kajamil sanabria, jame zelaya. [facility] [phone].    ATENCIÓN: Si habla español, tiene a palacios disposición servicios gratuitos de " asistencia lingüística. Maciej al [phone].    We comply with applicable federal civil rights laws and [place] laws. We do not discriminate on the basis of race, color, national origin, age, disability, sex, sexual orientation, or gender identity.            Thank you!     Thank you for choosing [facility]  for your care. Our goal is always to provide you with excellent care. Hearing back from our patients is one way we can continue to improve our services. Please take a few minutes to complete the written survey that you may receive in the mail after your visit with us. Thank you!             Your Updated Medication List - Protect others around you: Learn how to safely use, store and throw away your medicines at www.disposemymeds.org.          This list is accurate as of 2/21/18 12:29 PM.  Always use your most recent med list.                   Brand Name Dispense Instructions for use Diagnosis    aspirin 81 MG chewable tablet     1 tablet    Take 1 tablet (81 mg) by mouth daily    Benign essential hypertension       CALCIUM 600 + D 600-200 MG-UNIT Tabs     3 MONTHS    takes two tabs per day    Routine gynecological examination       CENTRUM SILVER per tablet      1 TABLET DAILY        cholecalciferol 1000 UNIT tablet    vitamin D3     Take 2 tablets by mouth daily.        hydrochlorothiazide 25 MG tablet    HYDRODIURIL    90 tablet    TAKE 1 TABLET (25 MG) BY MOUTH DAILY    HTN, goal below 140/80, Benign hypertension       ibuprofen 600 MG tablet    IBU    60 tablet    Take 1 tablet by mouth every 6 hours as needed for pain.    Migraine       lisinopril 5 MG tablet    PRINIVIL/ZESTRIL    90 tablet    Take 1 tablet (5 mg) by mouth daily    HTN, goal below 140/80       magnesium 500 MG Tabs      Take  by mouth.        melatonin 3 MG tablet     90 tablet    Take 2 tablets (6 mg) by mouth nightly as needed for sleep    Insomnia       OMEGA-3 CF 1000 MG Caps       Routine gynecological examination        PEPCID AC PO      None Entered        rizatriptan 10 MG tablet    MAXALT    18 tablet    Take 1 tablet (10 mg) by mouth at onset of headache for migraine May repeat dose in 2 hours.  Do not exceed 30 mg in 24 hours    Migraine with aura and with status migrainosus, not intractable       sertraline 50 MG tablet    ZOLOFT    90 tablet    Take 2 tablets (100 mg) by mouth daily    Major depressive disorder, recurrent episode, mild (H)       traMADol 50 MG tablet    ULTRAM    20 tablet    Take 1 tablet (50 mg) by mouth every 6 hours as needed for moderate pain    Pain in joint involving pelvic region and thigh, right       traZODone 50 MG tablet    DESYREL    90 tablet    Take 1 tablet (50 mg) by mouth nightly as needed for sleep    Persistent insomnia       vitamin E 1000 UNIT capsule    TOCOPHEROL     Take 3 capsules by mouth daily.

## 2018-02-21 NOTE — NURSING NOTE
"Chief Complaint   Patient presents with     Recheck Medication     Panel Management     flu        Initial /70 (BP Location: Left arm, Patient Position: Chair, Cuff Size: Adult Large)  Pulse 68  Temp 98  F (36.7  C) (Oral)  Resp 16  Ht 5' 5.75\" (1.67 m)  Wt 183 lb (83 kg)  LMP 07/01/2005  BMI 29.76 kg/m2 Estimated body mass index is 29.76 kg/(m^2) as calculated from the following:    Height as of this encounter: 5' 5.75\" (1.67 m).    Weight as of this encounter: 183 lb (83 kg).  Medication Reconciliation: complete    "

## 2018-02-22 ASSESSMENT — PATIENT HEALTH QUESTIONNAIRE - PHQ9: SUM OF ALL RESPONSES TO PHQ QUESTIONS 1-9: 3

## 2018-02-22 ASSESSMENT — ANXIETY QUESTIONNAIRES: GAD7 TOTAL SCORE: 0

## 2018-02-23 DIAGNOSIS — M25.551 PAIN IN JOINT INVOLVING PELVIC REGION AND THIGH, RIGHT: ICD-10-CM

## 2018-02-23 RX ORDER — TRAMADOL HYDROCHLORIDE 50 MG/1
TABLET ORAL
Qty: 20 TABLET | Refills: 0 | OUTPATIENT
Start: 2018-02-23

## 2018-02-23 NOTE — TELEPHONE ENCOUNTER
traMADol (ULTRAM) 50 MG tablet      Last Written Prescription Date:  10/05/2017  Last Fill Quantity: 20,   # refills: 0  Last Office Visit: 02/21/2018 - mood follow up  Future Office visit:       Routing refill request to provider for review/approval because:  Drug not on the [facility], [facility] or [facility] refill protocol or controlled substance    Jess Syed RN, BSN

## 2018-02-23 NOTE — PROGRESS NOTES
SUBJECTIVE:   Antoinette Sherwood is a 52 year old female who presents to clinic today for the following health issues:      HPI     Joint Pain    Onset: Friday     Description:   Location: right hip   Character: has right hip pain already but this injury has it flared up worse than usual     Intensity: moderate    Progression of Symptoms: same    Accompanying Signs & Symptoms:  Other symptoms: radiation of pain to mid thigh when climbing stairs     History:   Previous similar pain: YES      Precipitating factors:   Trauma or overuse: YES- slipped when speed walking     Alleviating factors:  Improved by: tramadol and ibuprofen. 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 Tramadol at night for joint pain.  She has flares from time to time which she manages with ibuprofen however with this recent injury to her right hip she has been taking ibuprofen during the day and was taking tramadol at night however she has run out of her tramadol last prescription was October.        Problem list and histories reviewed & adjusted, as indicated.  Additional history: as documented    BP Readings from Last 3 Encounters:   02/26/18 122/78   02/21/18 116/70   10/05/17 118/70    Wt Readings from Last 3 Encounters:   02/21/18 183 lb (83 kg)   10/05/17 183 lb (83 kg)   09/08/16 180 lb 4.8 oz (81.8 kg)              Labs reviewed in EPIC    ROS:  Constitutional, HEENT, cardiovascular, pulmonary, gi and gu systems are negative, except as otherwise noted.    OBJECTIVE:     /78 (BP Location: Left arm, Patient Position: Chair, Cuff Size: Adult Regular)  Pulse 68  Temp 98.3  F (36.8  C) (Oral)  Resp 16  LMP 07/01/2005  There is no height or weight on file to calculate BMI.  GENERAL: healthy, alert and no distress  NECK: no adenopathy, no asymmetry, masses, or scars and thyroid  normal to palpation  RESP: lungs clear to auscultation - no rales, rhonchi or wheezes  CV: regular rate and rhythm, normal S1 S2, no S3 or S4, no murmur, click or rub, no peripheral edema and peripheral pulses strong  ABDOMEN: soft, nontender, no hepatosplenomegaly, no masses and bowel sounds normal  MS: Right hip pain negative for crepitus, ecchymosis, inflammation or erythema.  She is limping on that side.  Range of motion is limited due to pain.  SKIN: no suspicious lesions or rashes    ASSESSMENT/PLAN:     1. Pain in joint involving pelvic region and thigh, right  Recommend continuing tramadol as indicated for severe pain of right hip.  Review of controlled substance agreement, PMDP reviewed.   - traMADol (ULTRAM) 50 MG tablet; Take 1 tablet (50 mg) by mouth every 6 hours as needed for moderate pain  Dispense: 30 tablet; Refill: 0  Home care discussed to continue ice alternated with heat and ibuprofen during the day may also use Tylenol for discomfort take both these with food use Ultram sparingly for severe pain.  Light stretching  Patient Instructions   Please follow up if symptoms do not improve or worsen.     Thank you  Sherry Yepez, [facility]

## 2018-02-26 ENCOUNTER — OFFICE VISIT (OUTPATIENT)
Dept: FAMILY MEDICINE | Facility: OTHER | Age: 53
End: 2018-02-26
Payer: COMMERCIAL

## 2018-02-26 VITALS
RESPIRATION RATE: 16 BRPM | SYSTOLIC BLOOD PRESSURE: 122 MMHG | HEART RATE: 68 BPM | DIASTOLIC BLOOD PRESSURE: 78 MMHG | TEMPERATURE: 98.3 F

## 2018-02-26 DIAGNOSIS — M25.551 PAIN IN JOINT INVOLVING PELVIC REGION AND THIGH, RIGHT: ICD-10-CM

## 2018-02-26 PROCEDURE — 99213 OFFICE O/P EST LOW 20 MIN: CPT | Performed by: NURSE PRACTITIONER

## 2018-02-26 RX ORDER — TRAMADOL HYDROCHLORIDE 50 MG/1
50 TABLET ORAL EVERY 6 HOURS PRN
Qty: 30 TABLET | Refills: 0 | Status: SHIPPED | OUTPATIENT
Start: 2018-02-26 | End: 2018-06-07

## 2018-02-26 NOTE — MR AVS SNAPSHOT
After Visit Summary   2/26/2018    Antoinette Sherwood    MRN: [id number]           Patient Information     Date Of Birth          1965        Visit Information        Provider Department      2/26/2018 5:50 PM Sherry Yepez APRN CNP [facility]        Today's Diagnoses     Pain in joint involving pelvic region and thigh, right          Care Instructions    Please follow up if symptoms do not improve or worsen.     Thank you  Sherry Yepez CNP            Follow-ups after your visit        Who to contact     If you have questions or need follow up information about today's clinic visit or your schedule please contact [facility] [place] [facility] directly at [phone].  Normal or non-critical lab and imaging results will be communicated to you by GuestMetricshart, letter or phone within 4 business days after the clinic has received the results. If you do not hear from us within 7 days, please contact the clinic through GuestMetricshart or phone. If you have a critical or abnormal lab result, we will notify you by phone as soon as possible.  Submit refill requests through [employer] or call your pharmacy and they will forward the refill request to us. Please allow 3 business days for your refill to be completed.          Additional Information About Your Visit        MyChart Information     [employer] gives you secure access to your electronic health record. If you see a primary care provider, you can also send messages to your care team and make appointments. If you have questions, please call your primary care clinic.  If you do not have a primary care provider, please call [phone] and they will assist you.        Care EveryWhere ID     This is your Care EveryWhere ID. This could be used by other organizations to access your [place] medical records  [id number]        Your Vitals Were     Pulse Temperature Respirations Last Period          68 98.3  F (36.8  C) (Oral) 16 07/01/2005          Blood Pressure from Last 3 Encounters:   02/26/18 122/78   02/21/18 116/70   10/05/17 118/70    Weight from Last 3 Encounters:   02/21/18 183 lb (83 kg)   10/05/17 183 lb (83 kg)   09/08/16 180 lb 4.8 oz (81.8 kg)              Today, you had the following     No orders found for display         Where to get your medicines      Some of these will need a paper prescription and others can be bought over the counter.  Ask your nurse if you have questions.     Bring a paper prescription for each of these medications     traMADol 50 MG tablet          Primary Care Provider Office Phone # Fax #    Aminta Agatha Davis [phone]       [address] [place] [address]        Equal Access to Services     YVETTE BROOKS : Too fernandez Soventura, waaxda luqadaha, qaybta kaalmada adenyamara, jame zelaya. [facility] [phone].    ATENCIÓN: Si habla español, tiene a palacios disposición servicios gratuitos de asistencia lingüística. Llame al [phone].    We comply with applicable federal civil rights laws and [place] laws. We do not discriminate on the basis of race, color, national origin, age, disability, sex, sexual orientation, or gender identity.            Thank you!     Thank you for choosing [facility]  for your care. Our goal is always to provide you with excellent care. Hearing back from our patients is one way we can continue to improve our services. Please take a few minutes to complete the written survey that you may receive in the mail after your visit with us. Thank you!             Your Updated Medication List - Protect others around you: Learn how to safely use, store and throw away your medicines at www.disposemymeds.org.          This list is accurate as of 2/26/18  6:17 PM.  Always use your most recent med list.                   Brand Name Dispense Instructions for use Diagnosis    aspirin 81 MG chewable tablet     1 tablet    Take 1  tablet (81 mg) by mouth daily    Benign essential hypertension       CALCIUM 600 + D 600-200 MG-UNIT Tabs     3 MONTHS    takes two tabs per day    Routine gynecological examination       CENTRUM SILVER per tablet      1 TABLET DAILY        cholecalciferol 1000 UNIT tablet    vitamin D3     Take 2 tablets by mouth daily.        hydrochlorothiazide 25 MG tablet    HYDRODIURIL    90 tablet    TAKE 1 TABLET (25 MG) BY MOUTH DAILY    HTN, goal below 140/80, Benign hypertension       ibuprofen 600 MG tablet    IBU    60 tablet    Take 1 tablet by mouth every 6 hours as needed for pain.    Migraine       lisinopril 5 MG tablet    PRINIVIL/ZESTRIL    90 tablet    Take 1 tablet (5 mg) by mouth daily    HTN, goal below 140/80       magnesium 500 MG Tabs      Take  by mouth.        melatonin 3 MG tablet     90 tablet    Take 2 tablets (6 mg) by mouth nightly as needed for sleep    Insomnia       OMEGA-3 CF 1000 MG Caps       Routine gynecological examination       PEPCID AC PO      None Entered        rizatriptan 10 MG tablet    MAXALT    18 tablet    Take 1 tablet (10 mg) by mouth at onset of headache for migraine May repeat dose in 2 hours.  Do not exceed 30 mg in 24 hours    Migraine with aura and with status migrainosus, not intractable       sertraline 50 MG tablet    ZOLOFT    90 tablet    Take 2 tablets (100 mg) by mouth daily    Major depressive disorder, recurrent episode, mild (H)       traMADol 50 MG tablet    ULTRAM    30 tablet    Take 1 tablet (50 mg) by mouth every 6 hours as needed for moderate pain    Pain in joint involving pelvic region and thigh, right       traZODone 50 MG tablet    DESYREL    90 tablet    Take 1 tablet (50 mg) by mouth nightly as needed for sleep    Persistent insomnia       vitamin E 1000 UNIT capsule    TOCOPHEROL     Take 3 capsules by mouth daily.

## 2018-02-27 NOTE — NURSING NOTE
"No chief complaint on file.      Initial /78 (BP Location: Left arm, Patient Position: Chair, Cuff Size: Adult Regular)  Pulse 68  Temp 98.3  F (36.8  C) (Oral)  Resp 16  LMP 07/01/2005 Estimated body mass index is 29.76 kg/(m^2) as calculated from the following:    Height as of 2/21/18: 5' 5.75\" (1.67 m).    Weight as of 2/21/18: 183 lb (83 kg).  Medication Reconciliation: complete    "

## 2018-03-25 DIAGNOSIS — I10 HTN, GOAL BELOW 140/80: ICD-10-CM

## 2018-03-25 DIAGNOSIS — I10 BENIGN HYPERTENSION: ICD-10-CM

## 2018-03-26 RX ORDER — HYDROCHLOROTHIAZIDE 25 MG/1
TABLET ORAL
Qty: 90 TABLET | Refills: 1 | Status: SHIPPED | OUTPATIENT
Start: 2018-03-26 | End: 2018-09-23

## 2018-03-26 NOTE — TELEPHONE ENCOUNTER
hydrochlorothiazide (HYDRODIURIL) 25 MG tablet  BP Readings from Last 3 Encounters:   02/26/18 122/78   02/21/18 116/70   10/05/17 118/70     Prescription approved per [facility] Refill Protocol.  Jess Syed RN, BSN

## 2018-04-02 ENCOUNTER — TELEPHONE (OUTPATIENT)
Dept: FAMILY MEDICINE | Facility: OTHER | Age: 53
End: 2018-04-02

## 2018-04-02 DIAGNOSIS — I10 HTN, GOAL BELOW 140/80: ICD-10-CM

## 2018-04-02 DIAGNOSIS — I10 BENIGN HYPERTENSION: ICD-10-CM

## 2018-04-02 NOTE — TELEPHONE ENCOUNTER
Reason for Call:  Medication or medication refill:    Do you use a [facility]?  Name of the pharmacy and phone number for the current request:  [facility]     Name of the medication requested: sertraline (ZOLOFT) 50 MG tablet    Other request: pt states needs medication refilled. Please advise and contact pt when filled or with questions    Can we leave a detailed message on this number? YES    Phone number patient can be reached at: Cell number on file:    Telephone Information:   Mobile [phone]       Best Time: ANY    Call taken on 4/2/2018 at 3:34 PM by Dayna Franco

## 2018-04-04 RX ORDER — HYDROCHLOROTHIAZIDE 25 MG/1
TABLET ORAL
Qty: 90 TABLET | Refills: 1 | OUTPATIENT
Start: 2018-04-04

## 2018-04-04 NOTE — TELEPHONE ENCOUNTER
hydrochlorothiazide (HYDRODIURIL) 25 MG tablet  Rx was sent 03/26/2018 for 90 tabs and 1 refills [facility]  Pharmacy notified via E-prescribe refusal.  Jess Syed, RN, BSN

## 2018-05-15 ENCOUNTER — TELEPHONE (OUTPATIENT)
Dept: FAMILY MEDICINE | Facility: OTHER | Age: 53
End: 2018-05-15

## 2018-05-15 DIAGNOSIS — F32.0 MILD MAJOR DEPRESSION (H): Primary | ICD-10-CM

## 2018-05-15 RX ORDER — SERTRALINE HYDROCHLORIDE 100 MG/1
100 TABLET, FILM COATED ORAL DAILY
Qty: 30 TABLET | Refills: 1 | Status: SHIPPED | OUTPATIENT
Start: 2018-05-15 | End: 2018-07-25

## 2018-05-15 NOTE — LETTER
[facility]  [address] [place] [address]  Phone: [phone]        May 18, 2018      Antoinette Sherwood                                                                                                   [address] [facility] [address]            Dear Ms. Clayton Sherwood,    We are concerned about your health care.  We recently provided you with a medication refill.  We are informing you of a change for Sertraline 50 mg to SERTRALINE 100 MG, as your insurance only allows 1 tablet per day. Please let us know if you have any questions or concerns.    Thank you,      Your [facility] Team

## 2018-05-15 NOTE — TELEPHONE ENCOUNTER
Per fax from [facility] pharmacy, please change Rx for Sertraline 50 mg to SERTRALINE 100 MG, Insurance only allows 1 tablet per day.   Please send new Rx if appropriate, thanks  Carole CASTILLO (R)

## 2018-05-15 NOTE — TELEPHONE ENCOUNTER
New rx sent for Sertraline 100 mg daily to [facility].     She will need follow up ov in July per last ov note.     Thank you  Sherry Yepez CNP

## 2018-06-05 NOTE — PROGRESS NOTES
SUBJECTIVE:   Antoinette Sherwood is a 52 year old female who presents to clinic today for the following health issues:      History of Present Illness     Diet:  Low salt and Low fat/cholesterol  Frequency of exercise:  4-5 days/week  Duration of exercise:  45-60 minutes  Taking medications regularly:  Yes  Medication side effects:  None  Additional concerns today:  No    Refill- Tramadol     Description:   Takes tramadol for hip pain, mainly at night. Helps a lot. Pt states that her hip pain has gotten better with the exercises she does but has a tendency to over do activity     Intesity: moderate    Progression of Symptoms:  same    Problem list and histories reviewed & adjusted, as indicated.  Additional history: as documented    Insomnia -refill Trazedone, talk up increasing the dose       Duration: years     Description  Frequency of insomnia:  nightly  Time to fall asleep: a few hours   Middle of night awakenin-3 times per night but not every night   Early morning awakening:  Works 3 10 hour shifts so 3 days a week pt wakes at  4 45am     Accompanying signs and symptoms:      History  Similar episodes in past:  YES  Previous evaluation/sleep study:  no     Precipitating or alleviating factors:  New stressful situation: no   Caffeine intake after lunchtime: YES- sometimes   OTC decongestants: no   Any new medications: no     Therapies tried and outcome: melatonin             Patient Active Problem List   Diagnosis     CARDIOVASCULAR SCREENING; LDL GOAL LESS THAN 160     Mild major depression (H)     Benign hypertension     HTN, goal below 140/80     Pain of left lower leg     Migraine with aura and without status migrainosus, not intractable     Past Surgical History:   Procedure Laterality Date     C NONSPECIFIC PROCEDURE      Left salpingectomy   Abstracted 02     [facility] SURGICAL PATHOLOGY      breast reduction     EXCISE LESION TRUNK  2012    Procedure: EXCISE LESION TRUNK;   Excision back mass;  Surgeon: Saqib Abraham MD;  Location: [facility]     HC CORRECT BUNION,METATARSAL OSTEOTOMY  12/03/09    right great toe     HC EXCISION LESION/TENDON-SHEATH/CAPSULE, FOOT  12/03/09     HC REMV TARSAL/METATARSAL BENIGN BONE LESN  12/03/09     HYSTERECTOMY, PAP NO LONGER INDICATED       HYSTERECTOMY, VAGINAL  2005    menorrhagia, normal paps     LAPAROSCOPIC CHOLECYSTECTOMY  2001     LITHOTRIPSY  2007    right kidney     TUBAL LIGATION  2001       Social History   Substance Use Topics     Smoking status: Never Smoker     Smokeless tobacco: Never Used     Alcohol use Yes      Comment: occ glass of wine     Family History   Problem Relation Age of Onset     Thyroid Disease Father      Hypertension Father      Lipids Father      Eye Disorder Mother      glaucoma     DIABETES Mother      Genitourinary Problems Mother      kidney stones     Asthma No family hx of      C.A.D. No family hx of      CEREBROVASCULAR DISEASE No family hx of      Breast Cancer No family hx of      Cancer - colorectal No family hx of      Prostate Cancer No family hx of      Alzheimer Disease No family hx of      Arthritis No family hx of      Blood Disease No family hx of      CANCER No family hx of      Cardiovascular No family hx of      Circulatory No family hx of      GASTROINTESTINAL DISEASE No family hx of      HEART DISEASE No family hx of      Musculoskeletal Disorder No family hx of      Neurologic Disorder No family hx of      Respiratory No family hx of          Current Outpatient Prescriptions   Medication Sig Dispense Refill     aspirin 81 MG chewable tablet Take 1 tablet (81 mg) by mouth daily 1 tablet 0     CALCIUM 600 + D 600-200 MG-UNIT OR TABS takes two tabs per day 3 MONTHS 1 YEAR     CENTRUM SILVER OR TABS 1 TABLET DAILY       Cholecalciferol (VITAMIN D3) 1000 UNIT TABS Take 2 tablets by mouth daily.       hydrochlorothiazide (HYDRODIURIL) 25 MG tablet TAKE 1 TABLET BY MOUTH ONCE DAILY 90 tablet 1      ibuprofen (IBU) 600 MG tablet Take 1 tablet by mouth every 6 hours as needed for pain. 60 tablet 0     lisinopril (PRINIVIL/ZESTRIL) 5 MG tablet Take 1 tablet (5 mg) by mouth daily 90 tablet 3     Magnesium 500 MG TABS Take  by mouth.       OMEGA-3 CF 1000 MG OR CAPS   0     PEPCID AC OR None Entered       rizatriptan (MAXALT) 10 MG tablet Take 1 tablet (10 mg) by mouth at onset of headache for migraine May repeat dose in 2 hours.  Do not exceed 30 mg in 24 hours 18 tablet 1     sertraline (ZOLOFT) 100 MG tablet Take 1 tablet (100 mg) by mouth daily 30 tablet 1     sertraline (ZOLOFT) 50 MG tablet Take 2 tablets (100 mg) by mouth daily 90 tablet 3     traMADol (ULTRAM) 50 MG tablet Take 1 tablet (50 mg) by mouth every 6 hours as needed for moderate pain 30 tablet 0     traZODone (DESYREL) 50 MG tablet Take 1 tablet (50 mg) by mouth nightly as needed for sleep 90 tablet 3     Vitamin E (TOCOPHEROL) 1000 UNIT capsule Take 3 capsules by mouth daily.       Allergies   Allergen Reactions     Sulfa Drugs Nausea and Vomiting     BP Readings from Last 3 Encounters:   06/07/18 114/68   02/26/18 122/78   02/21/18 116/70    Wt Readings from Last 3 Encounters:   06/07/18 185 lb (83.9 kg)   02/21/18 183 lb (83 kg)   10/05/17 183 lb (83 kg)            Labs reviewed in EPIC    ROS:  Constitutional, HEENT, cardiovascular, pulmonary, gi and gu systems are negative, except as otherwise noted.    OBJECTIVE:     /68 (BP Location: Right arm, Patient Position: Chair, Cuff Size: Adult Large)  Pulse 78  Temp 98.2  F (36.8  C) (Oral)  Resp 18  Wt 185 lb (83.9 kg)  LMP 07/01/2005  BMI 30.09 kg/m2  Body mass index is 30.09 kg/(m^2).  GENERAL: healthy, alert and no distress  NECK: no adenopathy, no asymmetry, masses, or scars and thyroid normal to palpation  RESP: lungs clear to auscultation - no rales, rhonchi or wheezes  CV: regular rate and rhythm, normal S1 S2, no S3 or S4, no murmur, click or rub, no peripheral edema and  peripheral pulses strong  ABDOMEN: soft, nontender, no hepatosplenomegaly, no masses and bowel sounds normal  MS: no gross musculoskeletal defects noted, no edema    Diagnostic Test Results:  none     ASSESSMENT/PLAN:     1. Persistent insomnia  Side effects of medications, proper use, the associated risk/benefits and other options were discussed. Patient understands these risks and agrees to take the medication as instructed.   Increased dose from 50 to 100 mg due to she states she has a hard time falling asleep and often take hour or more once asleep she sleeps well. She has taken two 50 mg and states that she then will fall asleep more easily and is able to sleep well.   - traZODone (DESYREL) 100 MG tablet; Take 1 tablet (100 mg) by mouth nightly as needed for sleep  Dispense: 90 tablet; Refill: 3    2. Pain of left lower leg  Has pain that waxes and wanes secondary to valley fever a previous diagnosis that affects her joints. She is usually able to be quite active and water skiis walks etc.. But some times the pain is severe and this is when she will need to use tramadol    reviewed.   3. Pain in joint involving pelvic region and thigh, right    - traMADol (ULTRAM) 50 MG tablet; Take 1 tablet (50 mg) by mouth every 6 hours as needed for moderate pain  Dispense: 30 tablet; Refill: 0    [facility]  Answers for HPI/ROS submitted by the patient on 6/7/2018   PHQ-2 Score: 0

## 2018-06-07 ENCOUNTER — OFFICE VISIT (OUTPATIENT)
Dept: FAMILY MEDICINE | Facility: OTHER | Age: 53
End: 2018-06-07
Payer: COMMERCIAL

## 2018-06-07 VITALS
TEMPERATURE: 98.2 F | BODY MASS INDEX: 30.09 KG/M2 | SYSTOLIC BLOOD PRESSURE: 114 MMHG | RESPIRATION RATE: 18 BRPM | HEART RATE: 78 BPM | WEIGHT: 185 LBS | DIASTOLIC BLOOD PRESSURE: 68 MMHG

## 2018-06-07 DIAGNOSIS — M25.551 PAIN IN JOINT INVOLVING PELVIC REGION AND THIGH, RIGHT: ICD-10-CM

## 2018-06-07 DIAGNOSIS — G47.00 PERSISTENT INSOMNIA: Primary | ICD-10-CM

## 2018-06-07 DIAGNOSIS — M79.662 PAIN OF LEFT LOWER LEG: ICD-10-CM

## 2018-06-07 PROCEDURE — 99213 OFFICE O/P EST LOW 20 MIN: CPT | Performed by: NURSE PRACTITIONER

## 2018-06-07 RX ORDER — TRAZODONE HYDROCHLORIDE 100 MG/1
100 TABLET ORAL
Qty: 90 TABLET | Refills: 3 | Status: SHIPPED | OUTPATIENT
Start: 2018-06-07 | End: 2018-10-12

## 2018-06-07 RX ORDER — TRAMADOL HYDROCHLORIDE 50 MG/1
50 TABLET ORAL EVERY 6 HOURS PRN
Qty: 30 TABLET | Refills: 0 | Status: SHIPPED | OUTPATIENT
Start: 2018-06-07 | End: 2018-08-07

## 2018-06-07 NOTE — MR AVS SNAPSHOT
After Visit Summary   6/7/2018    Antoinette Sherwood    MRN: [id number]           Patient Information     Date Of Birth          1965        Visit Information        Provider Department      6/7/2018 1:00 PM Sherry Yepez APRN CNP [facility]        Today's Diagnoses     Persistent insomnia    -  1    Pain of left lower leg        Pain in joint involving pelvic region and thigh, right           Follow-ups after your visit        Follow-up notes from your care team     Return in about 6 months (around 12/7/2018).      Who to contact     If you have questions or need follow up information about today's clinic visit or your schedule please contact [facility] directly at [phone].  Normal or non-critical lab and imaging results will be communicated to you by OSSIANIXhart, letter or phone within 4 business days after the clinic has received the results. If you do not hear from us within 7 days, please contact the clinic through OSSIANIXhart or phone. If you have a critical or abnormal lab result, we will notify you by phone as soon as possible.  Submit refill requests through Luxanova or call your pharmacy and they will forward the refill request to us. Please allow 3 business days for your refill to be completed.          Additional Information About Your Visit        MyChart Information     L[employer]anova gives you secure access to your electronic health record. If you see a primary care provider, you can also send messages to your care team and make appointments. If you have questions, please call your primary care clinic.  If you do not have a primary care provider, please call [phone] and they will assist you.        Care EveryWhere ID     This is your Care EveryWhere ID. This could be used by other organizations to access your [place] medical records  [id number]        Your Vitals Were     Pulse Temperature Respirations Last Period BMI (Body Mass Index)        78 98.2  F (36.8  C) (Oral) 18 07/01/2005 30.09 kg/m2        Blood Pressure from Last 3 Encounters:   06/07/18 114/68   02/26/18 122/78   02/21/18 116/70    Weight from Last 3 Encounters:   06/07/18 185 lb (83.9 kg)   02/21/18 183 lb (83 kg)   10/05/17 183 lb (83 kg)              Today, you had the following     No orders found for display         Today's Medication Changes          These changes are accurate as of 6/7/18  3:33 PM.  If you have any questions, ask your nurse or doctor.               These medicines have changed or have updated prescriptions.        Dose/Directions    * traZODone 50 MG tablet   Commonly known as:  DESYREL   This may have changed:  Another medication with the same name was added. Make sure you understand how and when to take each.   Used for:  Persistent insomnia   Changed by:  Sherry Yepez APRN CNP        Dose:  50 mg   Take 1 tablet (50 mg) by mouth nightly as needed for sleep   Quantity:  90 tablet   Refills:  3       * traZODone 100 MG tablet   Commonly known as:  DESYREL   This may have changed:  You were already taking a medication with the same name, and this prescription was added. Make sure you understand how and when to take each.   Used for:  Persistent insomnia   Changed by:  Sherry Yepez APRN CNP        Dose:  100 mg   Take 1 tablet (100 mg) by mouth nightly as needed for sleep   Quantity:  90 tablet   Refills:  3       * Notice:  This list has 2 medication(s) that are the same as other medications prescribed for you. Read the directions carefully, and ask your doctor or other care provider to review them with you.         Where to get your medicines      These medications were sent to [facility] - [address]  [address] [address]     Phone:  [phone]     traZODone 100 MG tablet         Some of these will need a paper prescription and others can be bought over the counter.   Ask your nurse if you have questions.     Bring a paper prescription for each of these medications     traMADol 50 MG tablet               Information about OPIOIDS     PRESCRIPTION OPIOIDS: WHAT YOU NEED TO KNOW   You have a prescription for an opioid (narcotic) pain medicine. Opioids can cause addiction. If you have a history of chemical dependency of any type, you are at a higher risk of becoming addicted to opioids. Only take this medicine after all other options have been tried. Take it for as short a time and as few doses as possible.     Do not:    Drive. If you drive while taking these medicines, you could be arrested for driving under the influence (DUI).    Operate heavy machinery    Do any other dangerous activities while taking these medicines.     Drink any alcohol while taking these medicines.      Take with any other medicines that contain acetaminophen. Read all labels carefully. Look for the word  acetaminophen  or  Tylenol.  Ask your pharmacist if you have questions or are unsure.    Store your pills in a secure place, locked if possible. We will not replace any lost or stolen medicine. If you don t finish your medicine, please throw away (dispose) as directed by your pharmacist. The [place] Pollution Control Agency has more information about safe disposal: https://www.pca.[facility].[place].us/living-green/managing-unwanted-medications    All opioids tend to cause constipation. Drink plenty of water and eat foods that have a lot of fiber, such as fruits, vegetables, prune juice, apple juice and high-fiber cereal. Take a laxative (Miralax, milk of magnesia, Colace, Senna) if you don t move your bowels at least every other day.          Primary Care Provider Office Phone # Fax #    Aminta Davis [phone]       [address] [place] [id number]        Equal Access to Services     YVETTE BROOKS AH: lena Nettles, jame montiel  sidra jadshobha emiliamat ladedrickdylan ah. So [facility] [phone].    ATENCIÓN: Si antonyla zay, tiene a palacios disposición servicios gratuitos de asistencia lingüística. Maciej berg [phone].    We comply with applicable federal civil rights laws and [place] laws. We do not discriminate on the basis of race, color, national origin, age, disability, sex, sexual orientation, or gender identity.            Thank you!     Thank you for choosing [facility]  for your care. Our goal is always to provide you with excellent care. Hearing back from our patients is one way we can continue to improve our services. Please take a few minutes to complete the written survey that you may receive in the mail after your visit with us. Thank you!             Your Updated Medication List - Protect others around you: Learn how to safely use, store and throw away your medicines at www.disposemymeds.org.          This list is accurate as of 6/7/18  3:33 PM.  Always use your most recent med list.                   Brand Name Dispense Instructions for use Diagnosis    aspirin 81 MG chewable tablet     1 tablet    Take 1 tablet (81 mg) by mouth daily    Benign essential hypertension       CALCIUM 600 + D 600-200 MG-UNIT Tabs     3 MONTHS    takes two tabs per day    Routine gynecological examination       CENTRUM SILVER per tablet      1 TABLET DAILY        cholecalciferol 1000 UNIT tablet    vitamin D3     Take 2 tablets by mouth daily.        hydrochlorothiazide 25 MG tablet    HYDRODIURIL    90 tablet    TAKE 1 TABLET BY MOUTH ONCE DAILY    HTN, goal below 140/80, Benign hypertension       ibuprofen 600 MG tablet    IBU    60 tablet    Take 1 tablet by mouth every 6 hours as needed for pain.    Migraine       lisinopril 5 MG tablet    PRINIVIL/ZESTRIL    90 tablet    Take 1 tablet (5 mg) by mouth daily    HTN, goal below 140/80       magnesium 500 MG Tabs      Take  by mouth.        OMEGA-3 CF 1000 MG Caps       Routine gynecological  examination       PEPCID AC PO      None Entered        rizatriptan 10 MG tablet    MAXALT    18 tablet    Take 1 tablet (10 mg) by mouth at onset of headache for migraine May repeat dose in 2 hours.  Do not exceed 30 mg in 24 hours    Migraine with aura and with status migrainosus, not intractable       sertraline 100 MG tablet    ZOLOFT    30 tablet    Take 1 tablet (100 mg) by mouth daily    Mild major depression (H)       traMADol 50 MG tablet    ULTRAM    30 tablet    Take 1 tablet (50 mg) by mouth every 6 hours as needed for moderate pain    Pain in joint involving pelvic region and thigh, right       * traZODone 50 MG tablet    DESYREL    90 tablet    Take 1 tablet (50 mg) by mouth nightly as needed for sleep    Persistent insomnia       * traZODone 100 MG tablet    DESYREL    90 tablet    Take 1 tablet (100 mg) by mouth nightly as needed for sleep    Persistent insomnia       vitamin E 1000 UNIT capsule    TOCOPHEROL     Take 3 capsules by mouth daily.        * Notice:  This list has 2 medication(s) that are the same as other medications prescribed for you. Read the directions carefully, and ask your doctor or other care provider to review them with you.

## 2018-07-25 DIAGNOSIS — F32.0 MILD MAJOR DEPRESSION (H): ICD-10-CM

## 2018-07-25 RX ORDER — SERTRALINE HYDROCHLORIDE 100 MG/1
TABLET, FILM COATED ORAL
Qty: 30 TABLET | Refills: 0 | Status: SHIPPED | OUTPATIENT
Start: 2018-07-25 | End: 2018-10-12

## 2018-07-25 NOTE — TELEPHONE ENCOUNTER
Per OV on 6/7/18 with Sherry Yepez:  [facility]  Answers for HPI/ROS submitted by the patient on 6/7/2018   PHQ-2 Score: 0         Instructions        Return in about 6 months (around 12/7/2018).     LMTC: Will have Sherry review and advise on further refills/follow up when she returns to clinic tomorrow.    Provider please review and advise on more than 1 month refill and follow up plan.    Opal Correa CMA (AAMA)

## 2018-07-25 NOTE — TELEPHONE ENCOUNTER
"Please call and help schedule follow up with Dr. Davis for follow up.        Requested Prescriptions   Pending Prescriptions Disp Refills     sertraline (ZOLOFT) 100 MG tablet [Pharmacy Med Name: SERTRALINE  MG TABLET] 30 tablet 1     Sig: TAKE 1 TABLET BY MOUTH EVERY DAY    SSRIs Protocol Passed    7/25/2018  1:51 AM       Passed - PHQ-9 score less than 5 in past 6 months    Please review last PHQ-9 score.          Passed - Patient is age 18 or older       Passed - No active pregnancy on record       Passed - No positive pregnancy test in last 12 months       Passed - Recent (6 mo) or future (30 days) visit within the authorizing provider's specialty    Patient had office visit in the last 6 months or has a visit in the next 30 days with authorizing provider or within the authorizing provider's specialty.  See \"Patient Info\" tab in inbasket, or \"Choose Columns\" in Meds & Orders section of the refill encounter.            Last OV: 02/21/2018 with WS.    Prescription approved per [facility] Refill Protocol.    Armando Urbina, RN, BSN    "

## 2018-07-25 NOTE — LETTER
[address] [address]  Phone: [phone]        July 30, 2018      Antoinette Sherwood                                                                                                                                [address] [facility] [address]            Dear Ms. Clayton Sherwood,    We are concerned about your health care.  We recently provided you with a medication refill.  Many medications require routine follow-up with your Doctor.      At this time we ask that: You schedule a routine office visit with your physician to follow your depression.     Your prescription: Has been refilled for 1 month so you may have time for the above noted follow-up.      Thank you,      Your [facility] Team

## 2018-07-25 NOTE — TELEPHONE ENCOUNTER
Lm for patient to call us back to inform zoloft filled for 1 month but then after will need an office visit.

## 2018-07-26 NOTE — TELEPHONE ENCOUNTER
LMTC, please inform patient that one month refill was given, for future refills she will need to follow up with PCP, even though she had an appointment on 6/7/2018 this was not discussed at that office visit  Carole CASTILLO (R)

## 2018-08-03 NOTE — PROGRESS NOTES
SUBJECTIVE:   Antoinette Sherwood is a 52 year old female who presents to clinic today for the following health issues:      The 10-year ASCVD risk score (Yuribrandi EMERSON Jr, et al., 2013) is: 1.6%    Values used to calculate the score:      Age: 52 years      Sex: Female      Is Non- : No      Diabetic: No      Tobacco smoker: No      Systolic Blood Pressure: 114 mmHg      Is BP treated: Yes      HDL Cholesterol: 57 mg/dL      Total Cholesterol: 192 mg/dL  Patient is eligible for use of low-dose aspirin for primary prevention of heart attack and stroke.  Provider has discussed aspirin with patient and our decision was:     Prescribe:  Daily low-dose aspirin recommended for primary prevention, patient agrees with plan.    Answers for HPI/ROS submitted by the patient on 8/7/2018   PHQ-2 Score: 0  If you checked off any problems, how difficult have these problems made it for you to do your work, take care of things at home, or get along with other people?: Not difficult at all  PHQ9 TOTAL SCORE: 0  LESLIE 7 TOTAL SCORE: 0      History of Present Illness     Depression & Anxiety Follow-up:     Depression/Anxiety:  Depression only    Status since last visit::  Improved    Other associated symptoms of depression::  None    Significant life event::  No    Current substance use::  None    Anxiety/Panic symptoms::  No       Today's PHQ-9         PHQ-9 Total Score:     (P) 0   PHQ-9 Q9 Suicidal ideation:   (P) Not at all   Thoughts of suicide or self harm:      Self-harm Plan:        Self-harm Action:          Safety concerns for self or others:       LESLIE-7 Total Score: (P) 0    Diet:  Low salt and Low fat/cholesterol  Frequency of exercise:  4-5 days/week  Duration of exercise:  30-45 minutes  Medication side effects:  None  Additional concerns today:  YES    PROBLEMS TO ADD ON... Complains of dry itchy eyes that sometimes tear.  He has been using Restasis twice daily for approximately 1 week has not  noticed any improvement.  States that she has been spending a lot more time outdoors and it seems to be aggravating her symptoms other irritating activity includes spending time on computer which she does for long periods of time at work.    She is also requesting refill of tramadol which she uses sparingly for hip pain. 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 She is usually able to be quite active and water skiis walks etc.. But some times the pain is severe and this is when she will need to use tramadol.  reviewed    Problem list and histories reviewed & adjusted, as indicated.  Additional history: as documented    Patient Active Problem List   Diagnosis     CARDIOVASCULAR SCREENING; LDL GOAL LESS THAN 160     Mild major depression (H)     Benign hypertension     HTN, goal below 140/80     Pain of left lower leg     Migraine with aura and without status migrainosus, not intractable     Past Surgical History:   Procedure Laterality Date     C NONSPECIFIC PROCEDURE  08/96    Left salpingectomy   Abstracted 07/01/02     [facility] AFF SURGICAL PATHOLOGY  2006    breast reduction     EXCISE LESION TRUNK  11/21/2012    Procedure: EXCISE LESION TRUNK;  Excision back mass;  Surgeon: Saqib Abraham MD;  Location: [facility]     HC CORRECT BUNION,METATARSAL OSTEOTOMY  12/03/09    right great toe     HC EXCISION LESION/TENDON-SHEATH/CAPSULE, FOOT  12/03/09     HC REMV TARSAL/METATARSAL BENIGN BONE LESN  12/03/09     HYSTERECTOMY, PAP NO LONGER INDICATED       HYSTERECTOMY, VAGINAL  2005    menorrhagia, normal paps     LAPAROSCOPIC CHOLECYSTECTOMY  2001     LITHOTRIPSY  2007    right kidney     TUBAL LIGATION  2001       Social History   Substance Use Topics     Smoking status: Never Smoker     Smokeless tobacco: Never Used     Alcohol use Yes      Comment: occ glass of wine     Family History   Problem Relation Age of Onset     Thyroid Disease Father      Hypertension  Father      Lipids Father      Eye Disorder Mother      glaucoma     Diabetes Mother      Genitourinary Problems Mother      kidney stones     Asthma No family hx of      C.A.D. No family hx of      Cerebrovascular Disease No family hx of      Breast Cancer No family hx of      Cancer - colorectal No family hx of      Prostate Cancer No family hx of      Alzheimer Disease No family hx of      Arthritis No family hx of      Blood Disease No family hx of      Cancer No family hx of      Cardiovascular No family hx of      Circulatory No family hx of      GASTROINTESTINAL DISEASE No family hx of      HEART DISEASE No family hx of      Musculoskeletal Disorder No family hx of      Neurologic Disorder No family hx of      Respiratory No family hx of          Current Outpatient Prescriptions   Medication Sig Dispense Refill     aspirin 81 MG chewable tablet Take 1 tablet (81 mg) by mouth daily 1 tablet 0     CALCIUM 600 + D 600-200 MG-UNIT OR TABS takes two tabs per day 3 MONTHS 1 YEAR     CENTRUM SILVER OR TABS 1 TABLET DAILY       Cholecalciferol (VITAMIN D3) 1000 UNIT TABS Take 2 tablets by mouth daily.       hydrochlorothiazide (HYDRODIURIL) 25 MG tablet TAKE 1 TABLET BY MOUTH ONCE DAILY 90 tablet 1     ibuprofen (IBU) 600 MG tablet Take 1 tablet by mouth every 6 hours as needed for pain. 60 tablet 0     lisinopril (PRINIVIL/ZESTRIL) 5 MG tablet Take 1 tablet (5 mg) by mouth daily 90 tablet 3     Magnesium 500 MG TABS Take  by mouth.       olopatadine HCl (PATADAY) 0.2 % SOLN Place 1 drop into both eyes daily 1 Bottle 0     OMEGA-3 CF 1000 MG OR CAPS   0     PEPCID AC OR None Entered       RESTASIS MULTIDOSE 0.05 % ophthalmic emulsion   2     rizatriptan (MAXALT) 10 MG tablet Take 1 tablet (10 mg) by mouth at onset of headache for migraine May repeat dose in 2 hours.  Do not exceed 30 mg in 24 hours 18 tablet 1     sertraline (ZOLOFT) 100 MG tablet Take 1 tablet (100 mg) by mouth daily 90 tablet 3     sertraline  (ZOLOFT) 100 MG tablet TAKE 1 TABLET BY MOUTH EVERY DAY 30 tablet 0     traMADol (ULTRAM) 50 MG tablet Take 1 tablet (50 mg) by mouth every 6 hours as needed for moderate pain 30 tablet 0     traZODone (DESYREL) 100 MG tablet Take 1 tablet (100 mg) by mouth nightly as needed for sleep 90 tablet 3     traZODone (DESYREL) 50 MG tablet Take 1 tablet (50 mg) by mouth nightly as needed for sleep 90 tablet 3     Vitamin E (TOCOPHEROL) 1000 UNIT capsule Take 3 capsules by mouth daily.       Allergies   Allergen Reactions     Sulfa Drugs Nausea and Vomiting     BP Readings from Last 3 Encounters:   08/07/18 116/80   06/07/18 114/68   02/26/18 122/78    Wt Readings from Last 3 Encounters:   08/07/18 183 lb (83 kg)   06/07/18 185 lb (83.9 kg)   02/21/18 183 lb (83 kg)                  Labs reviewed in EPIC    ROS:  Constitutional, HEENT, cardiovascular, pulmonary, gi and gu systems are negative, except as otherwise noted.    OBJECTIVE:     /80 (BP Location: Right arm, Patient Position: Chair, Cuff Size: Adult Regular)  Pulse 74  Temp 98.6  F (37  C) (Oral)  Resp 16  Wt 183 lb (83 kg)  LMP 07/01/2005  BMI 29.76 kg/m2  Body mass index is 29.76 kg/(m^2).  GENERAL: healthy, alert and no distress  EYES: Eyes grossly normal to inspection and conjunctiva/corneas- conjunctival injection OU  NECK: no adenopathy, no asymmetry, masses, or scars and thyroid normal to palpation  RESP: lungs clear to auscultation - no rales, rhonchi or wheezes  CV: regular rate and rhythm, normal S1 S2, no S3 or S4, no murmur, click or rub, no peripheral edema and peripheral pulses strong  MS: no gross musculoskeletal defects noted, no edema  PSYCH: mentation appears normal, affect normal/bright    ASSESSMENT/PLAN:     1. Mild major depression (H)  She is very stable will refill for 1 year supply will need to follow-up at that time.  - sertraline (ZOLOFT) 100 MG tablet; Take 1 tablet (100 mg) by mouth daily  Dispense: 90 tablet; Refill: 3    2.  Pain in joint involving pelvic region and thigh, right    Refill given as noted above patient takes for intermittent joint pain predominantly hip on the right side but may include other areas as well.  Due to history of valley fever.  - traMADol (ULTRAM) 50 MG tablet; Take 1 tablet (50 mg) by mouth every 6 hours as needed for moderate pain  Dispense: 30 tablet; Refill: 0    3. Dry eyes  Discussed using antihistamine to help with symptoms when she is outdoors.  - olopatadine HCl (PATADAY) 0.2 % SOLN; Place 1 drop into both eyes daily  Dispense: 1 Bottle; Refill: 0    4. Itchy eyes    - olopatadine HCl (PATADAY) 0.2 % SOLN; Place 1 drop into both eyes daily  Dispense: 1 Bottle; Refill: 0    Home care instructions were reviewed with the patient. The risks, benefits and treatment options of prescribed medications or other treatments have been discussed with the patient. The patient verbalized their understanding and should call or follow up if no improvement or if they develop further problems.  Return to clinic aramn      HONG Rose CNP  [facility]

## 2018-08-07 ENCOUNTER — OFFICE VISIT (OUTPATIENT)
Dept: FAMILY MEDICINE | Facility: OTHER | Age: 53
End: 2018-08-07
Payer: COMMERCIAL

## 2018-08-07 VITALS
WEIGHT: 183 LBS | BODY MASS INDEX: 29.76 KG/M2 | HEART RATE: 74 BPM | TEMPERATURE: 98.6 F | SYSTOLIC BLOOD PRESSURE: 116 MMHG | DIASTOLIC BLOOD PRESSURE: 80 MMHG | RESPIRATION RATE: 16 BRPM

## 2018-08-07 DIAGNOSIS — Z86.19 H/O COCCIDIOIDOMYCOSIS: ICD-10-CM

## 2018-08-07 DIAGNOSIS — F32.0 MILD MAJOR DEPRESSION (H): Primary | ICD-10-CM

## 2018-08-07 DIAGNOSIS — H04.123 DRY EYES: ICD-10-CM

## 2018-08-07 DIAGNOSIS — H57.9 ITCHY EYES: ICD-10-CM

## 2018-08-07 DIAGNOSIS — M25.551 PAIN IN JOINT INVOLVING PELVIC REGION AND THIGH, RIGHT: ICD-10-CM

## 2018-08-07 PROBLEM — A95.9: Status: ACTIVE | Noted: 2018-08-07

## 2018-08-07 PROCEDURE — 99214 OFFICE O/P EST MOD 30 MIN: CPT | Performed by: NURSE PRACTITIONER

## 2018-08-07 RX ORDER — SERTRALINE HYDROCHLORIDE 100 MG/1
100 TABLET, FILM COATED ORAL DAILY
Qty: 30 TABLET | Refills: 0 | Status: CANCELLED | OUTPATIENT
Start: 2018-08-07

## 2018-08-07 RX ORDER — TRAMADOL HYDROCHLORIDE 50 MG/1
50 TABLET ORAL EVERY 6 HOURS PRN
Qty: 30 TABLET | Refills: 0 | Status: SHIPPED | OUTPATIENT
Start: 2018-08-07 | End: 2018-10-12

## 2018-08-07 RX ORDER — SERTRALINE HYDROCHLORIDE 100 MG/1
100 TABLET, FILM COATED ORAL DAILY
Qty: 90 TABLET | Refills: 3 | Status: SHIPPED | OUTPATIENT
Start: 2018-08-07 | End: 2019-08-25

## 2018-08-07 RX ORDER — OLOPATADINE HYDROCHLORIDE 2 MG/ML
1 SOLUTION/ DROPS OPHTHALMIC DAILY
Qty: 1 BOTTLE | Refills: 0 | Status: SHIPPED | OUTPATIENT
Start: 2018-08-07 | End: 2018-10-12

## 2018-08-07 ASSESSMENT — ANXIETY QUESTIONNAIRES
7. FEELING AFRAID AS IF SOMETHING AWFUL MIGHT HAPPEN: NOT AT ALL
4. TROUBLE RELAXING: NOT AT ALL
2. NOT BEING ABLE TO STOP OR CONTROL WORRYING: NOT AT ALL
3. WORRYING TOO MUCH ABOUT DIFFERENT THINGS: NOT AT ALL
GAD7 TOTAL SCORE: 0
GAD7 TOTAL SCORE: 0
5. BEING SO RESTLESS THAT IT IS HARD TO SIT STILL: NOT AT ALL
6. BECOMING EASILY ANNOYED OR IRRITABLE: NOT AT ALL
7. FEELING AFRAID AS IF SOMETHING AWFUL MIGHT HAPPEN: NOT AT ALL
1. FEELING NERVOUS, ANXIOUS, OR ON EDGE: NOT AT ALL
GAD7 TOTAL SCORE: 0

## 2018-08-07 ASSESSMENT — PATIENT HEALTH QUESTIONNAIRE - PHQ9
SUM OF ALL RESPONSES TO PHQ QUESTIONS 1-9: 0
SUM OF ALL RESPONSES TO PHQ QUESTIONS 1-9: 0
10. IF YOU CHECKED OFF ANY PROBLEMS, HOW DIFFICULT HAVE THESE PROBLEMS MADE IT FOR YOU TO DO YOUR WORK, TAKE CARE OF THINGS AT HOME, OR GET ALONG WITH OTHER PEOPLE: NOT DIFFICULT AT ALL

## 2018-08-07 NOTE — MR AVS SNAPSHOT
After Visit Summary   8/7/2018    Antoinette Sherwood    MRN: [id number]           Patient Information     Date Of Birth          1965        Visit Information        Provider Department      8/7/2018 11:00 AM Sherry Yepez APRN CNP [facility]        Today's Diagnoses     Mild major depression (H)    -  1    Pain in joint involving pelvic region and thigh, right        Dry eyes        Itchy eyes        H/O coccidioidomycosis           Follow-ups after your visit        Who to contact     If you have questions or need follow up information about today's clinic visit or your schedule please contact [facility] directly at [phone].  Normal or non-critical lab and imaging results will be communicated to you by [facility], letter or phone within 4 business days after the clinic has received the results. If you do not hear from us within 7 days, please contact the clinic through [facility] [facility] or phone. If you have a critical or abnormal lab result, we will notify you by phone as soon as possible.  Submit refill requests through [facility] or call your pharmacy and they will forward the refill request to us. Please allow 3 business days for your refill to be completed.          Additional Information About Your Visit        MyChart Information     [facility] gives you secure access to your electronic health record. If you see a primary care provider, you can also send messages to your care team and make appointments. If you have questions, please call your primary care clinic.  If you do not have a primary care provider, please call [phone] and they will assist you.        Care EveryWhere ID     This is your Care EveryWhere ID. This could be used by other organizations to access your [place] medical records  [id number]        Your Vitals Were     Pulse Temperature Respirations Last Period BMI (Body Mass Index)       74 98.6  F (37  C) (Oral) 16 07/01/2005  29.76 kg/m2        Blood Pressure from Last 3 Encounters:   08/07/18 116/80   06/07/18 114/68   02/26/18 122/78    Weight from Last 3 Encounters:   08/07/18 183 lb (83 kg)   06/07/18 185 lb (83.9 kg)   02/21/18 183 lb (83 kg)              Today, you had the following     No orders found for display         Today's Medication Changes          These changes are accurate as of 8/7/18 12:27 PM.  If you have any questions, ask your nurse or doctor.               Start taking these medicines.        Dose/Directions    olopatadine HCl 0.2 % Soln   Commonly known as:  PATADAY   Used for:  Dry eyes, Itchy eyes   Started by:  Sherry Yepez APRN CNP        Dose:  1 drop   Place 1 drop into both eyes daily   Quantity:  1 Bottle   Refills:  0         These medicines have changed or have updated prescriptions.        Dose/Directions    * sertraline 100 MG tablet   Commonly known as:  ZOLOFT   This may have changed:  Another medication with the same name was added. Make sure you understand how and when to take each.   Used for:  Mild major depression (H)   Changed by:  Sherry Yepez APRN CNP        TAKE 1 TABLET BY MOUTH EVERY DAY   Quantity:  30 tablet   Refills:  0       * sertraline 100 MG tablet   Commonly known as:  ZOLOFT   This may have changed:  You were already taking a medication with the same name, and this prescription was added. Make sure you understand how and when to take each.   Used for:  Mild major depression (H)   Changed by:  Sherry Yepez APRN CNP        Dose:  100 mg   Take 1 tablet (100 mg) by mouth daily   Quantity:  90 tablet   Refills:  3       * Notice:  This list has 2 medication(s) that are the same as other medications prescribed for you. Read the directions carefully, and ask your doctor or other care provider to review them with you.         Where to get your medicines      These medications [facility] [address] [place] [facility] [place] [facility] [address]   [address] [address] [place], [place] [address] [address]     Phone:  [phone]     olopatadine HCl 0.2 % Soln    sertraline 100 MG tablet         Some of these will need a paper prescription and others can be bought over the counter.  Ask your nurse if you have questions.     Bring a paper prescription for each of these medications     traMADol 50 MG tablet               Information about OPIOIDS     PRESCRIPTION OPIOIDS: WHAT YOU NEED TO KNOW   We gave you an opioid (narcotic) pain medicine. It is important to manage your pain, but opioids are not always the best choice. You should first try all the other options your care team gave you. Take this medicine for as short a time (and as few doses) as possible.    Some activities can increase your pain, such as bandage changes or therapy sessions. It may help to take your pain medicine 30 to 60 minutes before these activities. Reduce your stress by getting enough sleep, working on hobbies you enjoy and practicing relaxation or meditation. Talk to your care team about ways to manage your pain beyond prescription opioids.    These medicines have risks:    DO NOT drive when on new or higher doses of pain medicine. These medicines can affect your alertness and reaction times, and you could be arrested for driving under the influence (DUI). If you need to use opioids long-term, talk to your care team about driving.    DO NOT operate heavy machinery    DO NOT do any other dangerous activities while taking these medicines.    DO NOT drink any alcohol while taking these medicines.     If the opioid prescribed includes acetaminophen, DO NOT take with any other medicines that contain acetaminophen. Read all labels carefully. Look for the word  acetaminophen  or  Tylenol.  Ask your pharmacist if you have questions or are unsure.    You can get addicted to pain medicines, especially if you have a history of addiction (chemical, alcohol or substance dependence). Talk to your care team about ways to  reduce this risk.    All opioids tend to cause constipation. Drink plenty of water and eat foods that have a lot of fiber, such as fruits, vegetables, prune juice, apple juice and high-fiber cereal. Take a laxative (Miralax, milk of magnesia, Colace, Senna) if you don t move your bowels at least every other day. Other side effects include upset stomach, sleepiness, dizziness, throwing up, tolerance (needing more of the medicine to have the same effect), physical dependence and slowed breathing.    Store your pills in a secure place, locked if possible. We will not replace any lost or stolen medicine. If you don t finish your medicine, please throw away (dispose) as directed by your pharmacist. The [place] Pollution Control Agency has more information about safe disposal: [email or web address]-[email or web address]-medications         Primary Care Provider Office Phone # Fax #    Aminta Davis [phone]       [address] [place] [address]        Equal Access to Services     HEYDI BROOKS : Hadii destinee bahena hadasho Soomaali, waaxda luqadaha, qaybta kaalmada adeemily, jame sifuentes . So [facility] [phone].    ATENCIÓN: Si habla español, tiene a palacios disposición servicios gratuitos de asistencia lingüística. [facility] [phone].    We comply with applicable federal civil rights laws and [place] laws. We do not discriminate on the basis of race, color, national origin, age, disability, sex, sexual orientation, or gender identity.            Thank you!     Thank you for choosing [facility]  for your care. Our goal is always to provide you with excellent care. Hearing back from our patients is one way we can continue to improve our services. Please take a few minutes to complete the written survey that you may receive in the mail after your visit with us. Thank you!             Your Updated Medication List - Protect others around you: Learn  how to safely use, store and throw away your medicines at www.disposemymeds.org.          This list is accurate as of 8/7/18 12:27 PM.  Always use your most recent med list.                   Brand Name Dispense Instructions for use Diagnosis    aspirin 81 MG chewable tablet     1 tablet    Take 1 tablet (81 mg) by mouth daily    Benign essential hypertension       CALCIUM 600 + D 600-200 MG-UNIT Tabs     3 MONTHS    takes two tabs per day    Routine gynecological examination       CENTRUM SILVER per tablet      1 TABLET DAILY        cholecalciferol 1000 UNIT tablet    vitamin D3     Take 2 tablets by mouth daily.        hydrochlorothiazide 25 MG tablet    HYDRODIURIL    90 tablet    TAKE 1 TABLET BY MOUTH ONCE DAILY    HTN, goal below 140/80, Benign hypertension       ibuprofen 600 MG tablet    IBU    60 tablet    Take 1 tablet by mouth every 6 hours as needed for pain.    Migraine       lisinopril 5 MG tablet    PRINIVIL/ZESTRIL    90 tablet    Take 1 tablet (5 mg) by mouth daily    HTN, goal below 140/80       magnesium 500 MG Tabs      Take  by mouth.        olopatadine HCl 0.2 % Soln    PATADAY    1 Bottle    Place 1 drop into both eyes daily    Dry eyes, Itchy eyes       OMEGA-3 CF 1000 MG Caps       Routine gynecological examination       PEPCID AC PO      None Entered        RESTASIS MULTIDOSE 0.05 % ophthalmic emulsion   Generic drug:  cycloSPORINE           rizatriptan 10 MG tablet    MAXALT    18 tablet    Take 1 tablet (10 mg) by mouth at onset of headache for migraine May repeat dose in 2 hours.  Do not exceed 30 mg in 24 hours    Migraine with aura and with status migrainosus, not intractable       * sertraline 100 MG tablet    ZOLOFT    30 tablet    TAKE 1 TABLET BY MOUTH EVERY DAY    Mild major depression (H)       * sertraline 100 MG tablet    ZOLOFT    90 tablet    Take 1 tablet (100 mg) by mouth daily    Mild major depression (H)       traMADol 50 MG tablet    ULTRAM    30 tablet    Take 1 tablet  (50 mg) by mouth every 6 hours as needed for moderate pain    Pain in joint involving pelvic region and thigh, right       * traZODone 50 MG tablet    DESYREL    90 tablet    Take 1 tablet (50 mg) by mouth nightly as needed for sleep    Persistent insomnia       * traZODone 100 MG tablet    DESYREL    90 tablet    Take 1 tablet (100 mg) by mouth nightly as needed for sleep    Persistent insomnia       vitamin E 1000 UNIT capsule    TOCOPHEROL     Take 3 capsules by mouth daily.        * Notice:  This list has 4 medication(s) that are the same as other medications prescribed for you. Read the directions carefully, and ask your doctor or other care provider to review them with you.

## 2018-08-08 ASSESSMENT — PATIENT HEALTH QUESTIONNAIRE - PHQ9: SUM OF ALL RESPONSES TO PHQ QUESTIONS 1-9: 0

## 2018-08-08 ASSESSMENT — ANXIETY QUESTIONNAIRES: GAD7 TOTAL SCORE: 0

## 2018-09-23 DIAGNOSIS — I10 HTN, GOAL BELOW 140/80: ICD-10-CM

## 2018-09-23 DIAGNOSIS — I10 BENIGN HYPERTENSION: ICD-10-CM

## 2018-09-24 NOTE — TELEPHONE ENCOUNTER
Lisinopril    Prescription approved per [facility] Refill Protocol.      Hydrochlorothiazide    Routing refill request to provider for review/approval because:  Labs out of range:  Sodium    Anna Hansen RN, BSN

## 2018-09-25 RX ORDER — HYDROCHLOROTHIAZIDE 25 MG/1
TABLET ORAL
Qty: 30 TABLET | Refills: 0 | Status: SHIPPED | OUTPATIENT
Start: 2018-09-25 | End: 2018-10-12

## 2018-09-25 RX ORDER — LISINOPRIL 5 MG/1
TABLET ORAL
Qty: 30 TABLET | Refills: 0 | Status: SHIPPED | OUTPATIENT
Start: 2018-09-25 | End: 2018-10-12

## 2018-09-25 NOTE — TELEPHONE ENCOUNTER
Joyce refills given for one month of hydrochlorothiazide 25 mg and  Lisinopril 5 mg she will need lab for further refills order placed for future lab can come in anytime for this.     Thank you  Sherry Yepez CNP

## 2018-09-26 NOTE — PROGRESS NOTES
SUBJECTIVE:   CC: Antoinette COYLE Clayton Sherwood is an 52 year old woman who presents for preventive health visit.     Physical   Annual:     Getting at least 3 servings of Calcium per day:  Yes    Bi-annual eye exam:  Yes    Dental care twice a year:  Yes    Sleep apnea or symptoms of sleep apnea:  None    Diet:  Low salt and Low fat/cholesterol    Taking medications regularly:  Yes    Medication side effects:  None    Additional concerns today:  No    Continues to work out weekly  Dental visits   Eye exam within las year.    PROBLEMS TO ADD ON...refills for medications     Today's PHQ-2 Score:   PHQ-2 ( 1999 [employer]) 10/12/2018   Q1: Little interest or pleasure in doing things 0   Q2: Feeling down, depressed or hopeless 0   PHQ-2 Score 0   Q1: Little interest or pleasure in doing things Not at all   Q2: Feeling down, depressed or hopeless Not at all   PHQ-2 Score 0       Abuse: Current or Past(Physical, Sexual or Emotional)- No  Do you feel safe in your environment - Yes    Social History   Substance Use Topics     Smoking status: Never Smoker     Smokeless tobacco: Never Used     Alcohol use Yes      Comment: occ glass of wine     Alcohol Use 10/12/2018   If you drink alcohol do you typically have greater than 3 drinks per day OR greater than 7 drinks per week? No       Reviewed orders with patient.  Reviewed health maintenance and updated orders accordingly - Yes  Labs reviewed in EPIC  BP Readings from Last 3 Encounters:   10/12/18 118/80   08/07/18 116/80   06/07/18 114/68    Wt Readings from Last 3 Encounters:   10/12/18 183 lb 9.6 oz (83.3 kg)   08/07/18 183 lb (83 kg)   06/07/18 185 lb (83.9 kg)                  Patient Active Problem List   Diagnosis     CARDIOVASCULAR SCREENING; LDL GOAL LESS THAN 160     Mild major depression (H)     Benign hypertension     Pain of left lower leg     Migraine with aura and without status migrainosus, not intractable     H/O coccidioidomycosis     Past Surgical History:    Procedure Laterality Date     C NONSPECIFIC PROCEDURE  08/96    Left salpingectomy   Abstracted 07/01/02     [facility] AFF SURGICAL PATHOLOGY  2006    breast reduction     EXCISE LESION TRUNK  11/21/2012    Procedure: EXCISE LESION TRUNK;  Excision back mass;  Surgeon: Saqib Abraham MD;  Location: [facility]     HC CORRECT BUNION,METATARSAL OSTEOTOMY  12/03/09    right great toe     HC EXCISION LESION/TENDON-SHEATH/CAPSULE, FOOT  12/03/09     HC REMV TARSAL/METATARSAL BENIGN BONE LESN  12/03/09     HYSTERECTOMY, PAP NO LONGER INDICATED       HYSTERECTOMY, VAGINAL  2005    menorrhagia, normal paps     LAPAROSCOPIC CHOLECYSTECTOMY  2001     LITHOTRIPSY  2007    right kidney     TUBAL LIGATION  2001       Social History   Substance Use Topics     Smoking status: Never Smoker     Smokeless tobacco: Never Used     Alcohol use Yes      Comment: occ glass of wine     Family History   Problem Relation Age of Onset     Thyroid Disease Father      Hypertension Father      Lipids Father      Eye Disorder Mother      glaucoma     Diabetes Mother      Genitourinary Problems Mother      kidney stones     Asthma No family hx of      C.A.D. No family hx of      Cerebrovascular Disease No family hx of      Breast Cancer No family hx of      Cancer - colorectal No family hx of      Prostate Cancer No family hx of      Alzheimer Disease No family hx of      Arthritis No family hx of      Blood Disease No family hx of      Cancer No family hx of      Cardiovascular No family hx of      Circulatory No family hx of      GASTROINTESTINAL DISEASE No family hx of      HEART DISEASE No family hx of      Musculoskeletal Disorder No family hx of      Neurologic Disorder No family hx of      Respiratory No family hx of          Current Outpatient Prescriptions   Medication Sig Dispense Refill     aspirin 81 MG chewable tablet Take 1 tablet (81 mg) by mouth daily 1 tablet 0     CALCIUM 600 + D 600-200 MG-UNIT OR TABS takes two tabs per day 3 MONTHS 1  YEAR     CENTRUM SILVER OR TABS 1 TABLET DAILY       Cholecalciferol (VITAMIN D3) 1000 UNIT TABS Take 2 tablets by mouth daily.       hydrochlorothiazide (HYDRODIURIL) 25 MG tablet Take 1 tablet (25 mg) by mouth daily 90 tablet 3     ibuprofen (IBU) 600 MG tablet Take 1 tablet by mouth every 6 hours as needed for pain. 60 tablet 0     lisinopril (PRINIVIL/ZESTRIL) 5 MG tablet TAKE 1 TABLET (5 MG) BY MOUTH DAILY 90 tablet 3     Magnesium 500 MG TABS Take  by mouth.       OMEGA-3 CF 1000 MG OR CAPS   0     PEPCID AC OR None Entered       RESTASIS MULTIDOSE 0.05 % ophthalmic emulsion   2     rizatriptan (MAXALT) 10 MG tablet Take 1 tablet (10 mg) by mouth at onset of headache for migraine May repeat dose in 2 hours.  Do not exceed 30 mg in 24 hours 18 tablet 1     sertraline (ZOLOFT) 100 MG tablet Take 1 tablet (100 mg) by mouth daily 90 tablet 3     traMADol (ULTRAM) 50 MG tablet Take 1 tablet (50 mg) by mouth every 6 hours as needed for moderate pain 30 tablet 0     traZODone (DESYREL) 100 MG tablet Take 1 tablet (100 mg) by mouth nightly as needed for sleep 90 tablet 3     traZODone (DESYREL) 50 MG tablet Take 1 tablet (50 mg) by mouth nightly as needed for sleep 90 tablet 3     Vitamin E (TOCOPHEROL) 1000 UNIT capsule Take 3 capsules by mouth daily.       [DISCONTINUED] hydrochlorothiazide (HYDRODIURIL) 25 MG tablet TAKE 1 TABLET BY MOUTH ONCE DAILY 30 tablet 0     [DISCONTINUED] lisinopril (PRINIVIL/ZESTRIL) 5 MG tablet TAKE 1 TABLET (5 MG) BY MOUTH DAILY 30 tablet 0     [DISCONTINUED] sertraline (ZOLOFT) 100 MG tablet TAKE 1 TABLET BY MOUTH EVERY DAY 30 tablet 0     [DISCONTINUED] traZODone (DESYREL) 100 MG tablet Take 1 tablet (100 mg) by mouth nightly as needed for sleep 90 tablet 3     [DISCONTINUED] traZODone (DESYREL) 50 MG tablet Take 1 tablet (50 mg) by mouth nightly as needed for sleep 90 tablet 3     Allergies   Allergen Reactions     Sulfa Drugs Nausea and Vomiting       Patient over age 50, mutual  decision to screen reflected in health maintenance.    Pertinent mammograms are reviewed under the imaging tab.  History of abnormal mammogram did not follow up denies symptoms     Bella Gomez MD   Fri Sep 16, 2016 12:19:33 PM CDT   ADDENDUM: Pathology results from the left breast ultrasound-guided  biopsy of the 3:30 position 4 cm from the nipple are benign and  concordant.      Pathology results, abbreviated (please see actual path report for  details): The final results reveal marked infiltrate of foamy  macrophages, lymphocytes, plasma cells, and associated giant cell  reaction. No atypical or malignant findings. The pathology notes a  significant neutrophil infiltrate is not seen. Pattern is not typical  of granulomatous lobular mastitis and special stains for AFB and fungi  are in process. Additionally the culture from 8/9/2016 aspiration  revealed heavy growth staph aureus, with antibiotic resistance.     RECOMMENDATIONS: Recommend close clinical follow-up for the left  breast palpable concern and infection. In addition recommend  additional follow-up on the final pathology results, as special stains  are in process for acid-fast bacillus and fungus.     Additionally, recommend 6 month follow-up ultrasound of the LEFT  breast to confirm resolution.   Six-month follow-up ultrasound is also recommended for the RIGHT  breast finding identified 8/9/2016, which was described as probably  benign.     BELLA GOMEZ MD         History of abnormal Pap smear: Status post benign hysterectomy. Health Maintenance and Surgical History updated.  PAP / HPV 10/29/2008 7/11/2005   PAP NIL OTHER-NIL EM>40     Reviewed and updated as needed this visit by clinical staff  Tobacco  Allergies  Meds  Problems  Med Hx  Surg Hx  Fam Hx  Soc Hx          Reviewed and updated as needed this visit by Provider  Allergies  Problems            Review of Systems   Constitutional: Negative for chills.   HENT: Negative  "for ear pain.    Gastrointestinal: Negative for abdominal pain, constipation and diarrhea.   Genitourinary: Negative for frequency and hematuria.   Psychiatric/Behavioral: The patient is not nervous/anxious.           OBJECTIVE:   /80  Pulse 60  Temp 98.1  F (36.7  C) (Oral)  Resp 16  Ht 5' 4.57\" (1.64 m)  Wt 183 lb 9.6 oz (83.3 kg)  LMP 07/01/2005  BMI 30.96 kg/m2  Physical Exam   Constitutional: She is oriented to person, place, and time. She appears well-developed and well-nourished.   HENT:   Head: Normocephalic and atraumatic.   Right Ear: External ear normal.   Left Ear: External ear normal.   Nose: Nose normal.   Mouth/Throat: Oropharynx is clear and moist.   Eyes: Conjunctivae and EOM are normal. Pupils are equal, round, and reactive to light.   Neck: Normal range of motion. Neck supple.   Cardiovascular: Normal rate, regular rhythm, normal heart sounds and intact distal pulses.    Pulmonary/Chest: Effort normal and breath sounds normal.   Abdominal: Soft. Bowel sounds are normal.   Musculoskeletal: Normal range of motion.   Neurological: She is alert and oriented to person, place, and time. She has normal reflexes.   Skin: Skin is warm and dry.   Psychiatric: She has a normal mood and affect. Her behavior is normal. Judgment and thought content normal.         ASSESSMENT/PLAN:   1. Encounter for preventative adult health care examination  Home care instructions were reviewed with the patient. The risks, benefits and treatment options of prescribed medications or other treatments have been discussed with the patient. The patient verbalized their understanding and should call or follow up if no improvement or if they develop further problems.  She is not fasting today will return to clinic lab for lab draw  - BASIC METABOLIC PANEL; Future  - Lipid Profile (Chol, Trig, HDL, LDL calc); Future    2. Visit for screening mammogram  Recommend 3-D mammogram due to history of fibrotic breast tissue and " had infection previous noted above 2016  - MA Screen w Implants Digital Bilat; Future    3. Screening for HIV (human immunodeficiency virus)    - HIV Screening; Future    4. Need for prophylactic vaccination and inoculation against influenza  declined    5. Family history of hyperlipidemia    - Lipid Profile (Chol, Trig, HDL, LDL calc); Future    6. Elevated LDL cholesterol level  recheck  - Lipid Profile (Chol, Trig, HDL, LDL calc); Future    7. Benign hypertension  Due for lab refill given  - BASIC METABOLIC PANEL; Future  - hydrochlorothiazide (HYDRODIURIL) 25 MG tablet; Take 1 tablet (25 mg) by mouth daily  Dispense: 90 tablet; Refill: 3    8. HTN, goal below 140/80    - hydrochlorothiazide (HYDRODIURIL) 25 MG tablet; Take 1 tablet (25 mg) by mouth daily  Dispense: 90 tablet; Refill: 3  - lisinopril (PRINIVIL/ZESTRIL) 5 MG tablet; TAKE 1 TABLET (5 MG) BY MOUTH DAILY  Dispense: 90 tablet; Refill: 3    9. Migraine with aura and with status migrainosus, not intractable  Refill given denies side effect  - rizatriptan (MAXALT) 10 MG tablet; Take 1 tablet (10 mg) by mouth at onset of headache for migraine May repeat dose in 2 hours.  Do not exceed 30 mg in 24 hours  Dispense: 18 tablet; Refill: 1    10. 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 Tramadol at night for joint pain.  She has flares from time to time which she manages with ibuprofen and intermittent Tramadol   - traMADol (ULTRAM) 50 MG tablet; Take 1 tablet (50 mg) by mouth every 6 hours as needed for moderate pain  Dispense: 30 tablet; Refill: 0    11. Persistent insomnia  Denies s/e this was increased during last ov from 50 mg to 100 mg doing well.   - traZODone (DESYREL) 100 MG tablet; Take 1 tablet (100 mg) by mouth nightly as needed for sleep   "Dispense: 90 tablet; Refill: 3      12. Family history of thyroid disease in father    - TSH with free T4 reflex; Future    COUNSELING:  Reviewed preventive health counseling, as reflected in patient instructions       Regular exercise       Healthy diet/nutrition       Vision screening       Immunizations    Declined: Influenza due to Other             BP Readings from Last 1 Encounters:   10/12/18 118/80     Estimated body mass index is 30.96 kg/(m^2) as calculated from the following:    Height as of this encounter: 5' 4.57\" (1.64 m).    Weight as of this encounter: 183 lb 9.6 oz (83.3 kg).           reports that she has never smoked. She has never used smokeless tobacco.      Counseling Resources:  ATP IV Guidelines  Pooled Cohorts Equation Calculator  Breast Cancer Risk Calculator  FRAX Risk Assessment  ICSI Preventive Guidelines  Dietary Guidelines for Americans, 2010  USDA's MyPlate  ASA Prophylaxis  Lung CA Screening    HONG Rose [facility]  Answers for HPI/ROS submitted by the patient on 10/12/2018   PHQ-2 Score: 0  If you checked off any problems, how difficult have these problems made it for you to do your work, take care of things at home, or get along with other people?: Not difficult at all  PHQ9 TOTAL SCORE: 0    "

## 2018-09-28 ENCOUNTER — ALLIED HEALTH/NURSE VISIT (OUTPATIENT)
Dept: FAMILY MEDICINE | Facility: OTHER | Age: 53
End: 2018-09-28
Payer: COMMERCIAL

## 2018-09-28 DIAGNOSIS — Z23 NEED FOR SHINGLES VACCINE: Primary | ICD-10-CM

## 2018-09-28 PROCEDURE — 90471 IMMUNIZATION ADMIN: CPT

## 2018-09-28 PROCEDURE — 99207 ZZC NO CHARGE NURSE ONLY: CPT

## 2018-09-28 PROCEDURE — 90750 HZV VACC RECOMBINANT IM: CPT

## 2018-09-28 NOTE — PROGRESS NOTES
Screening Questionnaire for Adult Immunization    Are you sick today?   No   Do you have allergies to medications, food, a vaccine component or latex?   No   Have you ever had a serious reaction after receiving a vaccination?   No   Do you have a long-term health problem with heart disease, lung disease, asthma, kidney disease, metabolic disease (e.g. diabetes), anemia, or other blood disorder?   No   Do you have cancer, leukemia, HIV/AIDS, or any other immune system problem?   No   In the past 3 months, have you taken medications that affect  your immune system, such as prednisone, other steroids, or anticancer drugs; drugs for the treatment of rheumatoid arthritis, Crohn s disease, or psoriasis; or have you had radiation treatments?   No   Have you had a seizure, or a brain or other nervous system problem?   No   During the past year, have you received a transfusion of blood or blood     products, or been given immune (gamma) globulin or antiviral drug?   No   For women: Are you pregnant or is there a chance you could become        pregnant during the next month?   No   Have you received any vaccinations in the past 4 weeks?   No     Immunization questionnaire answers were all negative.        Per orders of Sherry Yepez, injection of Shingrix  given by Naheed Wheat. Patient instructed to remain in clinic for 15 minutes afterwards, and to report any adverse reaction to me immediately.       Screening performed by Naheed Wheat on 9/28/2018 at 1:20 PM.

## 2018-09-28 NOTE — MR AVS SNAPSHOT
After Visit Summary   9/28/2018    Antoinette Sherwood    MRN: [id number]           Patient Information     Date Of Birth          1965        Visit Information        Provider Department      9/28/2018 1:30 PM NL FLOAT NURSE [facility]        Today's Diagnoses     Need for shingles vaccine    -  1       Follow-ups after your visit        Your next 10 appointments already scheduled     Sep 28, 2018  1:30 PM CDT   Nurse Only with NL FLOAT NURSE [facility] ([facility])    [address] [facility] [id number] [phone]            Oct 02, 2018 10:20 AM CDT   PHYSICAL with HONG Monsalve CNP   [facility]    [address] [facility] [address] [phone]              Who to contact     If you have questions or need follow up information about today's clinic visit or your schedule please contact [facility] directly at [phone].  Normal or non-critical lab and imaging results will be communicated to you by [employer], letter or phone within 4 business days after the clinic has received the results. If you do not hear from us within 7 days, please contact the clinic through [employer] or phone. If you have a critical or abnormal lab result, we will notify you by phone as soon as possible.  Submit refill requests through [employer] or call your pharmacy and they will forward the refill request to us. Please allow 3 business days for your refill to be completed.          Additional Information About Your Visit        MyChart Information     [employer] gives you secure access to your electronic health record. If you see a primary care provider, you can also send messages to your care team and make appointments. If you have questions, please call your primary care clinic.  If you do not have a primary care provider, please call [phone] and they will  assist you.        Care EveryWhere ID     This is your Care EveryWhere ID. This could be used by other organizations to access your [facility] medical records  [id number]        Your Vitals Were     Last Period                   07/01/2005            Blood Pressure from Last 3 Encounters:   08/07/18 116/80   06/07/18 114/68   02/26/18 122/78    Weight from Last 3 Encounters:   08/07/18 183 lb (83 kg)   06/07/18 185 lb (83.9 kg)   02/21/18 183 lb (83 kg)              We Performed the Following     INJECTION INTRAMUSCULAR OR SUB-Q     ZOSTER VACCINE RECOMBINANT ADJUVANTED IM NJX        Primary Care Provider Office Phone # Fax #    Aminta Davis [phone]       [address] [place] [id number]        Equal Access to Services     YVETTE BROOKS : Hadii destinee bahena hadasho Soomaali, waaxda luqadaha, qaybta kaalmada adeegyada, jame sifuentes . So [facility] [phone].    ATENCIÓN: Si habla español, tiene a palacios disposición servicios gratuitos de asistencia lingüística. Llame al [phone].    We comply with applicable federal civil rights laws and [place] laws. We do not discriminate on the basis of race, color, national origin, age, disability, sex, sexual orientation, or gender identity.            Thank you!     Thank you for choosing [facility]  for your care. Our goal is always to provide you with excellent care. Hearing back from our patients is one way we can continue to improve our services. Please take a few minutes to complete the written survey that you may receive in the mail after your visit with us. Thank you!             Your Updated Medication List - Protect others around you: Learn how to safely use, store and throw away your medicines at www.disposemymeds.org.          This list is accurate as of 9/28/18  1:26 PM.  Always use your most recent med list.                   Brand Name Dispense Instructions for use Diagnosis    aspirin 81 MG chewable  tablet     1 tablet    Take 1 tablet (81 mg) by mouth daily    Benign essential hypertension       CALCIUM 600 + D 600-200 MG-UNIT Tabs     3 MONTHS    takes two tabs per day    Routine gynecological examination       CENTRUM SILVER per tablet      1 TABLET DAILY        cholecalciferol 1000 UNIT tablet    vitamin D3     Take 2 tablets by mouth daily.        hydrochlorothiazide 25 MG tablet    HYDRODIURIL    30 tablet    TAKE 1 TABLET BY MOUTH ONCE DAILY    HTN, goal below 140/80, Benign hypertension       ibuprofen 600 MG tablet    IBU    60 tablet    Take 1 tablet by mouth every 6 hours as needed for pain.    Migraine       lisinopril 5 MG tablet    PRINIVIL/ZESTRIL    30 tablet    TAKE 1 TABLET (5 MG) BY MOUTH DAILY    HTN, goal below 140/80       magnesium 500 MG Tabs      Take  by mouth.        olopatadine HCl 0.2 % Soln    PATADAY    1 Bottle    Place 1 drop into both eyes daily    Dry eyes, Itchy eyes       OMEGA-3 CF 1000 MG Caps       Routine gynecological examination       PEPCID AC PO      None Entered        RESTASIS MULTIDOSE 0.05 % ophthalmic emulsion   Generic drug:  cycloSPORINE           rizatriptan 10 MG tablet    MAXALT    18 tablet    Take 1 tablet (10 mg) by mouth at onset of headache for migraine May repeat dose in 2 hours.  Do not exceed 30 mg in 24 hours    Migraine with aura and with status migrainosus, not intractable       * sertraline 100 MG tablet    ZOLOFT    30 tablet    TAKE 1 TABLET BY MOUTH EVERY DAY    Mild major depression (H)       * sertraline 100 MG tablet    ZOLOFT    90 tablet    Take 1 tablet (100 mg) by mouth daily    Mild major depression (H)       traMADol 50 MG tablet    ULTRAM    30 tablet    Take 1 tablet (50 mg) by mouth every 6 hours as needed for moderate pain    Pain in joint involving pelvic region and thigh, right       * traZODone 50 MG tablet    DESYREL    90 tablet    Take 1 tablet (50 mg) by mouth nightly as needed for sleep    Persistent insomnia       *  traZODone 100 MG tablet    DESYREL    90 tablet    Take 1 tablet (100 mg) by mouth nightly as needed for sleep    Persistent insomnia       vitamin E 1000 UNIT capsule    TOCOPHEROL     Take 3 capsules by mouth daily.        * Notice:  This list has 4 medication(s) that are the same as other medications prescribed for you. Read the directions carefully, and ask your doctor or other care provider to review them with you.

## 2018-10-12 ENCOUNTER — OFFICE VISIT (OUTPATIENT)
Dept: FAMILY MEDICINE | Facility: OTHER | Age: 53
End: 2018-10-12
Payer: COMMERCIAL

## 2018-10-12 VITALS
HEIGHT: 65 IN | TEMPERATURE: 98.1 F | BODY MASS INDEX: 30.59 KG/M2 | SYSTOLIC BLOOD PRESSURE: 118 MMHG | RESPIRATION RATE: 16 BRPM | HEART RATE: 60 BPM | DIASTOLIC BLOOD PRESSURE: 80 MMHG | WEIGHT: 183.6 LBS

## 2018-10-12 DIAGNOSIS — Z00.00 ENCOUNTER FOR PREVENTATIVE ADULT HEALTH CARE EXAMINATION: Primary | ICD-10-CM

## 2018-10-12 DIAGNOSIS — E78.00 ELEVATED LDL CHOLESTEROL LEVEL: ICD-10-CM

## 2018-10-12 DIAGNOSIS — Z83.49 FAMILY HISTORY OF THYROID DISEASE IN FATHER: ICD-10-CM

## 2018-10-12 DIAGNOSIS — Z23 NEED FOR PROPHYLACTIC VACCINATION AND INOCULATION AGAINST INFLUENZA: ICD-10-CM

## 2018-10-12 DIAGNOSIS — Z11.4 SCREENING FOR HIV (HUMAN IMMUNODEFICIENCY VIRUS): ICD-10-CM

## 2018-10-12 DIAGNOSIS — Z83.438 FAMILY HISTORY OF HYPERLIPIDEMIA: ICD-10-CM

## 2018-10-12 DIAGNOSIS — G47.00 PERSISTENT INSOMNIA: ICD-10-CM

## 2018-10-12 DIAGNOSIS — Z12.31 VISIT FOR SCREENING MAMMOGRAM: ICD-10-CM

## 2018-10-12 DIAGNOSIS — M25.551 PAIN IN JOINT INVOLVING PELVIC REGION AND THIGH, RIGHT: ICD-10-CM

## 2018-10-12 DIAGNOSIS — I10 HTN, GOAL BELOW 140/80: ICD-10-CM

## 2018-10-12 DIAGNOSIS — I10 BENIGN HYPERTENSION: ICD-10-CM

## 2018-10-12 DIAGNOSIS — G43.101 MIGRAINE WITH AURA AND WITH STATUS MIGRAINOSUS, NOT INTRACTABLE: ICD-10-CM

## 2018-10-12 PROCEDURE — 99396 PREV VISIT EST AGE 40-64: CPT | Performed by: NURSE PRACTITIONER

## 2018-10-12 RX ORDER — LISINOPRIL 5 MG/1
TABLET ORAL
Qty: 90 TABLET | Refills: 3 | Status: SHIPPED | OUTPATIENT
Start: 2018-10-12 | End: 2019-11-30

## 2018-10-12 RX ORDER — HYDROCHLOROTHIAZIDE 25 MG/1
25 TABLET ORAL DAILY
Qty: 90 TABLET | Refills: 3 | Status: SHIPPED | OUTPATIENT
Start: 2018-10-12 | End: 2019-11-30

## 2018-10-12 RX ORDER — TRAZODONE HYDROCHLORIDE 50 MG/1
50 TABLET, FILM COATED ORAL
Qty: 90 TABLET | Refills: 3 | Status: SHIPPED | OUTPATIENT
Start: 2018-10-12 | End: 2019-06-21 | Stop reason: DRUGHIGH

## 2018-10-12 RX ORDER — TRAMADOL HYDROCHLORIDE 50 MG/1
50 TABLET ORAL EVERY 6 HOURS PRN
Qty: 30 TABLET | Refills: 0 | Status: SHIPPED | OUTPATIENT
Start: 2018-10-12 | End: 2018-10-12

## 2018-10-12 RX ORDER — RIZATRIPTAN BENZOATE 10 MG/1
10 TABLET ORAL
Qty: 18 TABLET | Refills: 1 | Status: SHIPPED | OUTPATIENT
Start: 2018-10-12 | End: 2019-06-21

## 2018-10-12 RX ORDER — TRAZODONE HYDROCHLORIDE 100 MG/1
100 TABLET ORAL
Qty: 90 TABLET | Refills: 3 | Status: SHIPPED | OUTPATIENT
Start: 2018-10-12 | End: 2019-09-13

## 2018-10-12 ASSESSMENT — ENCOUNTER SYMPTOMS
DIARRHEA: 0
HEMATURIA: 0
ABDOMINAL PAIN: 0
CHILLS: 0
NERVOUS/ANXIOUS: 0
CONSTIPATION: 0
FREQUENCY: 0

## 2018-10-12 NOTE — MR AVS SNAPSHOT
After Visit Summary   10/12/2018    Antoinette Sherwood    MRN: [id number]           Patient Information     Date Of Birth          1965        Visit Information        Provider Department      10/12/2018 10:40 AM Sherry Yepez APRN [facility]        Today's Diagnoses     Encounter for preventative adult health care examination    -  1    Visit for screening mammogram        Screening for HIV (human immunodeficiency virus)        Need for prophylactic vaccination and inoculation against influenza        Family history of hyperlipidemia        Elevated LDL cholesterol level        Benign hypertension        HTN, goal below 140/80        Migraine with aura and with status migrainosus, not intractable        Pain in joint involving pelvic region and thigh, right        Persistent insomnia        Family history of thyroid disease in father          Care Instructions      Preventive Health Recommendations  Female Ages 50 - 64    Yearly exam: See your health care provider every year in order to  o Review health changes.   o Discuss preventive care.    o Review your medicines if your doctor has prescribed any.      Get a Pap test every three years (unless you have an abnormal result and your provider advises testing more often).    If you get Pap tests with HPV test, you only need to test every 5 years, unless you have an abnormal result.     You do not need a Pap test if your uterus was removed (hysterectomy) and you have not had cancer.    You should be tested each year for STDs (sexually transmitted diseases) if you're at risk.     Have a mammogram every 1 to 2 years.    Have a colonoscopy at age 50, or have a yearly FIT test (stool test). These exams screen for colon cancer.      Have a cholesterol test every 5 years, or more often if advised.    Have a diabetes test (fasting glucose) every three years. If you are at risk for diabetes, you should have this test  more often.     If you are at risk for osteoporosis (brittle bone disease), think about having a bone density scan (DEXA).    Shots: Get a flu shot each year. Get a tetanus shot every 10 years.    Nutrition:     Eat at least 5 servings of fruits and vegetables each day.    Eat whole-grain bread, whole-wheat pasta and brown rice instead of white grains and rice.    Get adequate Calcium and Vitamin D.     Lifestyle    Exercise at least 150 minutes a week (30 minutes a day, 5 days a week). This will help you control your weight and prevent disease.    Limit alcohol to one drink per day.    No smoking.     Wear sunscreen to prevent skin cancer.     See your dentist every six months for an exam and cleaning.    See your eye doctor every 1 to 2 years.            Follow-ups after your visit        Future tests that were ordered for you today     Open Future Orders        Priority Expected Expires Ordered    TSH with free T4 reflex Routine  10/12/2019 10/12/2018    HIV Screening Routine  10/12/2019 10/12/2018    BASIC METABOLIC PANEL Routine  10/12/2019 10/12/2018    Lipid Profile (Chol, Trig, HDL, LDL calc) Routine  10/12/2019 10/12/2018    MA Screen w Implants Digital Bilat Routine  10/12/2019 10/12/2018            Who to contact     If you have questions or need follow up information about today's clinic visit or your schedule please contact [facility] directly at [phone].  Normal or non-critical lab and imaging results will be communicated to you by MyChart, letter or phone within 4 business days after the clinic has received the results. If you do not hear from us within 7 days, please contact the clinic through MyChart or phone. If you have a critical or abnormal lab result, we will notify you by phone as soon as possible.  Submit refill requests through [employer] or call your pharmacy and they will forward the refill request to us. Please allow 3 business days for your refill to be completed.        "   Additional Information About Your Visit        [employer] Information     [employer] gives you secure access to your electronic health record. If you see a primary care provider, you can also send messages to your care team and make appointments. If you have questions, please call your primary care clinic.  If you do not have a primary care provider, please call [phone] and they will assist you.        Care EveryWhere ID     This is your Care EveryWhere ID. This could be used by other organizations to access your [place] medical records  [id number]        Your Vitals Were     Pulse Temperature Respirations Height Last Period BMI (Body Mass Index)    60 98.1  F (36.7  C) (Oral) 16 5' 4.57\" (1.64 m) 07/01/2005 30.96 kg/m2       Blood Pressure from Last 3 Encounters:   10/12/18 118/80   08/07/18 116/80   06/07/18 114/68    Weight from Last 3 Encounters:   10/12/18 183 lb 9.6 oz (83.3 kg)   08/07/18 183 lb (83 kg)   06/07/18 185 lb (83.9 kg)                 Today's Medication Changes          These changes are accurate as of 10/12/18 11:34 AM.  If you have any questions, ask your nurse or doctor.               These medicines have changed or have updated prescriptions.        Dose/Directions    hydrochlorothiazide 25 MG tablet   Commonly known as:  HYDRODIURIL   This may have changed:  See the new instructions.   Used for:  HTN, goal below 140/80, Benign hypertension   Changed by:  Sherry Yepez APRN CNP        Dose:  25 mg   Take 1 tablet (25 mg) by mouth daily   Quantity:  90 tablet   Refills:  3            Where to get your medicines      These medications were sent to [place] [address] [facility] [place] [address] [facility]  [address] [facility] [address]     Phone:  [phone]     hydrochlorothiazide 25 MG tablet    lisinopril 5 MG tablet    rizatriptan 10 MG tablet    traZODone 100 MG tablet    traZODone 50 MG tablet         Some of these will need a paper prescription and others can be bought " over the counter.  Ask your nurse if you have questions.     Bring a paper prescription for each of these medications     traMADol 50 MG tablet               Information about OPIOIDS     PRESCRIPTION OPIOIDS: WHAT YOU NEED TO KNOW   We gave you an opioid (narcotic) pain medicine. It is important to manage your pain, but opioids are not always the best choice. You should first try all the other options your care team gave you. Take this medicine for as short a time (and as few doses) as possible.    Some activities can increase your pain, such as bandage changes or therapy sessions. It may help to take your pain medicine 30 to 60 minutes before these activities. Reduce your stress by getting enough sleep, working on hobbies you enjoy and practicing relaxation or meditation. Talk to your care team about ways to manage your pain beyond prescription opioids.    These medicines have risks:    DO NOT drive when on new or higher doses of pain medicine. These medicines can affect your alertness and reaction times, and you could be arrested for driving under the influence (DUI). If you need to use opioids long-term, talk to your care team about driving.    DO NOT operate heavy machinery    DO NOT do any other dangerous activities while taking these medicines.    DO NOT drink any alcohol while taking these medicines.     If the opioid prescribed includes acetaminophen, DO NOT take with any other medicines that contain acetaminophen. Read all labels carefully. Look for the word  acetaminophen  or  Tylenol.  Ask your pharmacist if you have questions or are unsure.    You can get addicted to pain medicines, especially if you have a history of addiction (chemical, alcohol or substance dependence). Talk to your care team about ways to reduce this risk.    All opioids tend to cause constipation. Drink plenty of water and eat foods that have a lot of fiber, such as fruits, vegetables, prune juice, apple juice and high-fiber cereal.  Take a laxative (Miralax, milk of magnesia, Colace, Senna) if you don t move your bowels at least every other day. Other side effects include upset stomach, sleepiness, dizziness, throwing up, tolerance (needing more of the medicine to have the same effect), physical dependence and slowed breathing.    Store your pills in a secure place, locked if possible. We will not replace any lost or stolen medicine. If you don t finish your medicine, please throw away (dispose) as directed by your pharmacist. The [place] Pollution Control Agency has more information about safe disposal: https://[email or web address].[facility].us/living-green/managing-unwanted-medications         Primary Care Provider Office Phone # Fax #    Aminta Davis [phone]       [address] [place] [address]        Equal Access to Services     YVETTE BROOKS : Hadii destinee henryo Soventura, waaxda luqlizzette, qaybta kaalmada elly, jame sifuentes . So [facility] [phone].    ATENCIÓN: Si habla español, tiene a palacios disposición servicios gratuitos de asistencia lingüística. Maciej al [phone].    We comply with applicable federal civil rights laws and [place] laws. We do not discriminate on the basis of race, color, national origin, age, disability, sex, sexual orientation, or gender identity.            Thank you!     Thank you for choosing [facility]  for your care. Our goal is always to provide you with excellent care. Hearing back from our patients is one way we can continue to improve our services. Please take a few minutes to complete the written survey that you may receive in the mail after your visit with us. Thank you!             Your Updated Medication List - Protect others around you: Learn how to safely use, store and throw away your medicines at www.disposemymeds.org.          This list is accurate as of 10/12/18 11:34 AM.  Always use your most recent med list.                    Brand Name Dispense Instructions for use Diagnosis    aspirin 81 MG chewable tablet     1 tablet    Take 1 tablet (81 mg) by mouth daily    Benign essential hypertension       CALCIUM 600 + D 600-200 MG-UNIT Tabs     3 MONTHS    takes two tabs per day    Routine gynecological examination       CENTRUM SILVER per tablet      1 TABLET DAILY        cholecalciferol 1000 UNIT tablet    vitamin D3     Take 2 tablets by mouth daily.        hydrochlorothiazide 25 MG tablet    HYDRODIURIL    90 tablet    Take 1 tablet (25 mg) by mouth daily    HTN, goal below 140/80, Benign hypertension       ibuprofen 600 MG tablet    IBU    60 tablet    Take 1 tablet by mouth every 6 hours as needed for pain.    Migraine       lisinopril 5 MG tablet    PRINIVIL/ZESTRIL    90 tablet    TAKE 1 TABLET (5 MG) BY MOUTH DAILY    HTN, goal below 140/80       magnesium 500 MG Tabs      Take  by mouth.        OMEGA-3 CF 1000 MG Caps       Routine gynecological examination       PEPCID AC PO      None Entered        RESTASIS MULTIDOSE 0.05 % ophthalmic emulsion   Generic drug:  cycloSPORINE           rizatriptan 10 MG tablet    MAXALT    18 tablet    Take 1 tablet (10 mg) by mouth at onset of headache for migraine May repeat dose in 2 hours.  Do not exceed 30 mg in 24 hours    Migraine with aura and with status migrainosus, not intractable       sertraline 100 MG tablet    ZOLOFT    90 tablet    Take 1 tablet (100 mg) by mouth daily    Mild major depression (H)       traMADol 50 MG tablet    ULTRAM    30 tablet    Take 1 tablet (50 mg) by mouth every 6 hours as needed for moderate pain    Pain in joint involving pelvic region and thigh, right       * traZODone 100 MG tablet    DESYREL    90 tablet    Take 1 tablet (100 mg) by mouth nightly as needed for sleep    Persistent insomnia       * traZODone 50 MG tablet    DESYREL    90 tablet    Take 1 tablet (50 mg) by mouth nightly as needed for sleep    Persistent insomnia       vitamin E 1000 UNIT  capsule    TOCOPHEROL     Take 3 capsules by mouth daily.        * Notice:  This list has 2 medication(s) that are the same as other medications prescribed for you. Read the directions carefully, and ask your doctor or other care provider to review them with you.

## 2018-10-12 NOTE — LETTER
My Depression Action Plan  Name: Antoinette Sherwood   Date of Birth 1965  Date: 9/26/2018    My doctor: Aminta Davis   My clinic: [facility]  [address] [facility] [address] [phone]          GREEN    ZONE   Good Control    What it looks like:     Things are going generally well. You have normal up s and down s. You may even feel depressed from time to time, but bad moods usually last less than a day.   What you need to do:  1. Continue to care for yourself (see self care plan)  2. Check your depression survival kit and update it as needed  3. Follow your physician s recommendations including any medication.  4. Do not stop taking medication unless you consult with your physician first.           YELLOW         ZONE Getting Worse    What it looks like:     Depression is starting to interfere with your life.     It may be hard to get out of bed; you may be starting to isolate yourself from others.    Symptoms of depression are starting to last most all day and this has happened for several days.     You may have suicidal thoughts but they are not constant.   What you need to do:     1. Call your care team, your response to treatment will improve if you keep your care team informed of your progress. Yellow periods are signs an adjustment may need to be made.     2. Continue your self-care, even if you have to fake it!    3. Talk to someone in your support network    4. Open up your depression survival kit           RED    ZONE Medical Alert - Get Help    What it looks like:     Depression is seriously interfering with your life.     You may experience these or other symptoms: You can t get out of bed most days, can t work or engage in other necessary activities, you have trouble taking care of basic hygiene, or basic responsibilities, thoughts of suicide or death that will not go away, self-injurious behavior.     What you need to do:  1. Call your care  team and request a same-day appointment. If they are not available (weekends or after hours) call your local crisis line, emergency room or 911.            Depression Self Care Plan / Survival Kit    Self-Care for Depression  Here s the deal. Your body and mind are really not as separate as most people think.  What you do and think affects how you feel and how you feel influences what you do and think. This means if you do things that people who feel good do, it will help you feel better.  Sometimes this is all it takes.  There is also a place for medication and therapy depending on how severe your depression is, so be sure to consult with your medical provider and/ or Behavioral Health Consultant if your symptoms are worsening or not improving.     In order to better manage my stress, I will:    Exercise  Get some form of exercise, every day. This will help reduce pain and release endorphins, the  feel good  chemicals in your brain. This is almost as good as taking antidepressants!  This is not the same as joining a gym and then never going! (they count on that by the way ) It can be as simple as just going for a walk or doing some gardening, anything that will get you moving.      Hygiene   Maintain good hygiene (Get out of bed in the morning, Make your bed, Brush your teeth, Take a shower, and Get dressed like you were going to work, even if you are unemployed).  If your clothes don't fit try to get ones that do.    Diet  I will strive to eat foods that are good for me, drink plenty of water, and avoid excessive sugar, caffeine, alcohol, and other mood-altering substances.  Some foods that are helpful in depression are: complex carbohydrates, B vitamins, flaxseed, fish or fish oil, fresh fruits and vegetables.    Psychotherapy  I agree to participate in Individual Therapy (if recommended).    Medication  If prescribed medications, I agree to take them.  Missing doses can result in serious side effects.  I  understand that drinking alcohol, or other illicit drug use, may cause potential side effects.  I will not stop my medication abruptly without first discussing it with my provider.    Staying Connected With Others  I will stay in touch with my friends, family members, and my primary care provider/team.    Use your imagination  Be creative.  We all have a creative side; it doesn t matter if it s oil painting, sand castles, or mud pies! This will also kick up the endorphins.    Witness Beauty  (AKA stop and smell the roses) Take a look outside, even in mid-winter. Notice colors, textures. Watch the squirrels and birds.     Service to others  Be of service to others.  There is always someone else in need.  By helping others we can  get out of ourselves  and remember the really important things.  This also provides opportunities for practicing all the other parts of the program.    Humor  Laugh and be silly!  Adjust your TV habits for less news and crime-drama and more comedy.    Control your stress  Try breathing deep, massage therapy, biofeedback, and meditation. Find time to relax each day.     My support system    Clinic Contact:  Phone number:    Contact 1:  Phone number:    Contact 2:  Phone number:    Confucianist/:  Phone number:    Therapist:  Phone number:    Local crisis center:    Phone number:    Other community support:  Phone number:

## 2018-10-13 ASSESSMENT — PATIENT HEALTH QUESTIONNAIRE - PHQ9: SUM OF ALL RESPONSES TO PHQ QUESTIONS 1-9: 0

## 2018-10-15 DIAGNOSIS — Z12.31 VISIT FOR SCREENING MAMMOGRAM: ICD-10-CM

## 2018-10-15 DIAGNOSIS — Z11.4 SCREENING FOR HIV (HUMAN IMMUNODEFICIENCY VIRUS): ICD-10-CM

## 2018-10-15 DIAGNOSIS — E78.00 ELEVATED LDL CHOLESTEROL LEVEL: ICD-10-CM

## 2018-10-15 DIAGNOSIS — I10 BENIGN HYPERTENSION: ICD-10-CM

## 2018-10-15 DIAGNOSIS — Z83.438 FAMILY HISTORY OF HYPERLIPIDEMIA: ICD-10-CM

## 2018-10-15 DIAGNOSIS — Z83.49 FAMILY HISTORY OF THYROID DISEASE IN FATHER: ICD-10-CM

## 2018-10-15 DIAGNOSIS — Z00.00 ENCOUNTER FOR PREVENTATIVE ADULT HEALTH CARE EXAMINATION: ICD-10-CM

## 2018-10-15 LAB
ANION GAP SERPL CALCULATED.3IONS-SCNC: 6 MMOL/L (ref 3–14)
BUN SERPL-MCNC: 21 MG/DL (ref 7–30)
CALCIUM SERPL-MCNC: 9.4 MG/DL (ref 8.5–10.1)
CHLORIDE SERPL-SCNC: 104 MMOL/L (ref 94–109)
CHOLEST SERPL-MCNC: 199 MG/DL
CO2 SERPL-SCNC: 31 MMOL/L (ref 20–32)
CREAT SERPL-MCNC: 0.95 MG/DL (ref 0.52–1.04)
GFR SERPL CREATININE-BSD FRML MDRD: 61 ML/MIN/1.7M2
GLUCOSE SERPL-MCNC: 92 MG/DL (ref 70–99)
HDLC SERPL-MCNC: 50 MG/DL
HIV 1+2 AB+HIV1 P24 AG SERPL QL IA: NONREACTIVE
LDLC SERPL CALC-MCNC: 121 MG/DL
NONHDLC SERPL-MCNC: 149 MG/DL
POTASSIUM SERPL-SCNC: 3.9 MMOL/L (ref 3.4–5.3)
SODIUM SERPL-SCNC: 141 MMOL/L (ref 133–144)
TRIGL SERPL-MCNC: 139 MG/DL
TSH SERPL DL<=0.005 MIU/L-ACNC: 1.98 MU/L (ref 0.4–4)

## 2018-10-15 PROCEDURE — 80048 BASIC METABOLIC PNL TOTAL CA: CPT | Performed by: NURSE PRACTITIONER

## 2018-10-15 PROCEDURE — 80061 LIPID PANEL: CPT | Performed by: NURSE PRACTITIONER

## 2018-10-15 PROCEDURE — 87389 HIV-1 AG W/HIV-1&-2 AB AG IA: CPT | Performed by: NURSE PRACTITIONER

## 2018-10-15 PROCEDURE — 36415 COLL VENOUS BLD VENIPUNCTURE: CPT | Performed by: NURSE PRACTITIONER

## 2018-10-15 PROCEDURE — 84443 ASSAY THYROID STIM HORMONE: CPT | Performed by: NURSE PRACTITIONER

## 2018-10-15 NOTE — LETTER
[address] [address]  Phone: [phone]  December 27, 2018      Antoinette Sherwood  [address] [facility] [address]      Dear Antoinette,    We care about your health and have reviewed your health plan including your medical conditions, medications, and lab results.  Based on this review, it is recommended that you follow up regarding the following health topic(s):  -Breast Cancer Screening    We recommend you take the following action(s):  -schedule a MAMMOGRAM which is due. Please disregard this reminder if you have had this exam elsewhere within the last 1-2 years please let us know so we can update your records.     Please call us at the [facility] - [phone] (or use [facility]) to address the above recommendations.     Thank you for trusting [facility] and we appreciate the opportunity to serve you.  We look forward to supporting your healthcare needs in the future.    Healthy Regards,    Your Health Care Team  [facility]

## 2018-10-16 NOTE — PROGRESS NOTES
Please advise Antoinette Sherwood,  1965, that her lab results were negative HIV screen, normal metabolic panel (electrolytes, kidney function and glucose). Cholesterol normal except mildly elevated LDL at 121 desired is <100. And normal thyroid hormone.   [phone] (home) [phone] (work)  Thank you  Sherry Yepez CNP

## 2018-10-17 ENCOUNTER — TELEPHONE (OUTPATIENT)
Dept: FAMILY MEDICINE | Facility: OTHER | Age: 53
End: 2018-10-17

## 2018-10-17 NOTE — TELEPHONE ENCOUNTER
----- Message from HONG Monsalve CNP sent at 10/16/2018 11:59 AM CDT -----  Please advise Antoinette Sherwood,  1965, that her lab results were negative HIV screen, normal metabolic panel (electrolytes, kidney function and glucose). Cholesterol normal except mildly elevated LDL at 121 desired is <100. And normal thyroid hormone.   [phone] (home) [phone] (work)  Thank you  Sherry Yepez CNP

## 2018-10-17 NOTE — TELEPHONE ENCOUNTER
Left message for patient to return call to clinic.  Please inform of message below.    Aminta Zarate, [facility]  October 17, 2018

## 2018-10-30 ENCOUNTER — TELEPHONE (OUTPATIENT)
Dept: FAMILY MEDICINE | Facility: OTHER | Age: 53
End: 2018-10-30

## 2018-10-30 NOTE — TELEPHONE ENCOUNTER
Result Notes   Notes Recorded by Aminta Zarate MA on 10/17/2018 at 11:35 AM  See telephone encounter.    Aminta Zarate CMA  2018    ------    Notes Recorded by Jada Denise MA on 10/16/2018 at 2:34 PM  Spoke with pt, she is concerned that she may need cholesterol medication. Flagging for WS to review regarding this.  ------    Notes Recorded by Sherry Yepez APRN CNP on 10/16/2018 at 11:59 AM  Please advise Antoinette Sherwood,  1965, that her lab results were negative HIV screen, normal metabolic panel (electrolytes, kidney function and glucose). Cholesterol normal except mildly elevated LDL at 121 desired is <100. And normal thyroid hormone.   [phone] (home) [phone] (work)  Thank you  Sherry Yepez CNP     Letter was sent on 10/18/2018    Please call patient.

## 2018-10-30 NOTE — TELEPHONE ENCOUNTER
Reason for Call:  Request for results:    Name of test or procedure: cholesterol results    Date of test of procedure: 10-15-18    Location of the test or procedure: [facility] to leave the result message on voice mail or with a family member? YES    Phone number Patient can be reached at:  Home number on file [phone] (home)    Additional comments: please call to discuss her cholesterol results. She is wondering if she should start on a medication for this.    Call taken on 10/30/2018 at 11:00 AM by Jessica Duke

## 2018-10-31 NOTE — TELEPHONE ENCOUNTER
Patient informed of the message below.  She said this is what she needed to know.  Nothing further at this time.  I told her to call with any further questions.  Edgar Graff, CMA

## 2018-10-31 NOTE — TELEPHONE ENCOUNTER
The 10-year ASCVD risk score (Yuri EMERSON Jr, et al., 2013) is: 2.1%    Values used to calculate the score:      Age: 52 years      Sex: Female      Is Non- : No      Diabetic: No      Tobacco smoker: No      Systolic Blood Pressure: 118 mmHg      Is BP treated: Yes      HDL Cholesterol: 50 mg/dL      Total Cholesterol: 199 mg/dL    For patient with 10 year cvd risk <5 % recommended guidelines state lifestyle modifications including eating a heart healthy diet, regular aerobic activity 150 minutes per week and avoidance of tobacco.     Hope this is helpful.     Thank you  Sherry Yepez CNP

## 2018-11-01 ENCOUNTER — TELEPHONE (OUTPATIENT)
Dept: FAMILY MEDICINE | Facility: OTHER | Age: 53
End: 2018-11-01

## 2018-11-01 DIAGNOSIS — M25.551 PAIN IN JOINT INVOLVING PELVIC REGION AND THIGH, RIGHT: ICD-10-CM

## 2018-11-01 NOTE — TELEPHONE ENCOUNTER
Tramadol 50 MG tablet not on medlist  Sig: Take 1 tablet by mouth every 6 hours as needed for moderate pain   Thanks  Carole Vivas RT (R)

## 2018-11-02 RX ORDER — TRAMADOL HYDROCHLORIDE 50 MG/1
50 TABLET ORAL EVERY 6 HOURS PRN
Qty: 30 TABLET | Refills: 0 | Status: SHIPPED | OUTPATIENT
Start: 2018-11-02 | End: 2019-06-21

## 2018-11-02 NOTE — TELEPHONE ENCOUNTER
Will refill one time due to needing to resign a new controlled substance agreement with me if she is going to have further refills.     Tramadol 50 mg refill given one time.     Thank you  Sherry Yepez CNP

## 2018-12-13 ENCOUNTER — TELEPHONE (OUTPATIENT)
Dept: FAMILY MEDICINE | Facility: OTHER | Age: 53
End: 2018-12-13

## 2018-12-13 NOTE — LETTER
[facility]  [address]   [place] [address]  Phone: [phone]  March 11, 2019      Antoinette Sherwood  [address] [address] [address]      Dear Antoinette,    We care about your health and have reviewed your health plan including your medical conditions, medications, and lab results.  Based on this review, it is recommended that you follow up regarding the following health topic(s):  -Breast Cancer Screening    We recommend you take the following action(s):  -schedule a MAMMOGRAM which is due. Please disregard this reminder if you have had this exam elsewhere within the last 1-2 years please let us know so we can update your records.     Please call us at the [facility] - [phone] (or use [facility]) to address the above recommendations.     Thank you for trusting [facility] and we appreciate the opportunity to serve you.  We look forward to supporting your healthcare needs in the future.    Healthy Regards,    Your Health Care Team  [facility]

## 2018-12-13 NOTE — TELEPHONE ENCOUNTER
Summary:    Patient is due/failing the following:   MAMMOGRAM    Action needed:   Schedule a mammogram    Type of outreach:    Phone, spoke to patient.  patient will call back to schedule    Questions for provider review:    None                                                                                                                                    Hermelinda Uzma       Chart routed to Care Team .          Panel Management Review      Patient has the following on her problem list:     Depression / Dysthymia review    Measure:  Needs PHQ-9 score of 4 or less during index window.  Administer PHQ-9 and if score is 5 or more, send encounter to provider for next steps.        PHQ-9 SCORE 2/21/2018 8/7/2018 10/12/2018   PHQ-9 Total Score - - -   PHQ-9 Total Score MyChart 3 (Minimal depression) 0 0   PHQ-9 Total Score 3 0 0       If PHQ-9 recheck is 5 or more, route to provider for next steps.    Patient is due for:  PHQ9    Hypertension   Last three blood pressure readings:  BP Readings from Last 3 Encounters:   10/12/18 118/80   08/07/18 116/80   06/07/18 114/68     Blood pressure: Passed    HTN Guidelines:  Age 18-59 BP range:  Less than 140/90  Age 60-85 with Diabetes:  Less than 140/90  Age 60-85 without Diabetes:  less than 150/90      Composite cancer screening  Chart review shows that this patient is due/due soon for the following Mammogram

## 2019-01-02 ENCOUNTER — ALLIED HEALTH/NURSE VISIT (OUTPATIENT)
Dept: FAMILY MEDICINE | Facility: OTHER | Age: 54
End: 2019-01-02
Payer: COMMERCIAL

## 2019-01-02 DIAGNOSIS — Z23 ENCOUNTER FOR IMMUNIZATION: Primary | ICD-10-CM

## 2019-01-02 PROCEDURE — 99207 ZZC NO CHARGE NURSE ONLY: CPT

## 2019-01-02 PROCEDURE — 90471 IMMUNIZATION ADMIN: CPT

## 2019-01-02 PROCEDURE — 90750 HZV VACC RECOMBINANT IM: CPT

## 2019-01-02 NOTE — PROGRESS NOTES
Screening Questionnaire for Adult Immunization    Are you sick today?   No   Do you have allergies to medications, food, a vaccine component or latex?   No   Have you ever had a serious reaction after receiving a vaccination?   No   Do you have a long-term health problem with heart disease, lung disease, asthma, kidney disease, metabolic disease (e.g. diabetes), anemia, or other blood disorder?   No   Do you have cancer, leukemia, HIV/AIDS, or any other immune system problem?   No   In the past 3 months, have you taken medications that affect  your immune system, such as prednisone, other steroids, or anticancer drugs; drugs for the treatment of rheumatoid arthritis, Crohn s disease, or psoriasis; or have you had radiation treatments?   No   Have you had a seizure, or a brain or other nervous system problem?   No   During the past year, have you received a transfusion of blood or blood     products, or been given immune (gamma) globulin or antiviral drug?   No   For women: Are you pregnant or is there a chance you could become        pregnant during the next month?   No   Have you received any vaccinations in the past 4 weeks?   No     Immunization questionnaire answers were all negative.        Per orders of Aminta Davis, injection of Shingrix given by Pat Muro. Patient instructed to remain in clinic for 15 minutes afterwards, and to report any adverse reaction to me immediately.       Screening performed by Pat Muro on 1/2/2019 at 3:37 PM.

## 2019-03-27 ENCOUNTER — TELEPHONE (OUTPATIENT)
Dept: FAMILY MEDICINE | Facility: OTHER | Age: 54
End: 2019-03-27

## 2019-03-27 NOTE — TELEPHONE ENCOUNTER
Per outreach, patient is aware of overdue screening and will call on their own time for scheduling.     Thanks

## 2019-06-18 NOTE — PROGRESS NOTES
Subjective      Answers for HPI/ROS submitted by the patient on 6/21/2019   Chronic problems general questions HPI Form  If you checked off any problems, how difficult have these problems made it for you to do your work, take care of things at home, or get along with other people?: Somewhat difficult  PHQ9 TOTAL SCORE: 6  LESLIE 7 TOTAL SCORE: 2    Antoinette Sherwood is a 53 year old female who presents to clinic today for the following health issues:    History of Present Illness        Mental Health Follow-up:  Patient presents to follow-up on Depression.Patient's depression since last visit has been:  Bad  The patient is not having other symptoms associated with depression.      Any significant life events: No  Patient is not feeling anxious or having panic attacks.  Patient has no concerns about alcohol or drug use.     Social History  Tobacco Use    Smoking status: Never Smoker    Smokeless tobacco: Never Used  Alcohol use: Yes    Comment: occ glass of wine  Drug use: No      Today's PHQ-9         PHQ-9 Total Score:     (P) 6   PHQ-9 Q9 Thoughts of better off dead/self-harm past 2 weeks :   (P) Not at all   Thoughts of suicide or self harm:      Self-harm Plan:        Self-harm Action:          Safety concerns for self or others:           She eats 4 or more servings of fruits and vegetables daily.She consumes 2 sweetened beverage(s) daily.  She is taking medications regularly.     LESLIE-7 SCORE 2/21/2018 8/7/2018 6/21/2019   Total Score - - -   Total Score 0 (minimal anxiety) 0 (minimal anxiety) 2 (minimal anxiety)   Total Score 0 0 2     States she has been feeling a little more unable to fall asleep and is having less desire and aggravation. Less able to handle stressful situations.     She is having symptoms of left side lump below rib cage and above hip bone mildly tender.     Would like refill of Tramodol she takes sparingly for pain secondary to tick bite she will have pain in left lower leg and hip  intermittently.         Patient Active Problem List   Diagnosis     CARDIOVASCULAR SCREENING; LDL GOAL LESS THAN 160     Mild major depression (H)     Benign hypertension     Pain of left lower leg     Migraine with aura and without status migrainosus, not intractable     H/O coccidioidomycosis     Past Surgical History:   Procedure Laterality Date     C NONSPECIFIC PROCEDURE  08/96    Left salpingectomy   Abstracted 07/01/02     [facility]FF SURGICAL PATHOLOGY  2006    breast reduction     EXCISE LESION TRUNK  11/21/2012    Procedure: EXCISE LESION TRUNK;  Excision back mass;  Surgeon: Saqib Abraham MD;  Location: [facility]     HC CORRECT BUNION,METATARSAL OSTEOTOMY  12/03/09    right great toe     HC EXCISION LESION/TENDON-SHEATH/CAPSULE, FOOT  12/03/09     HC REMV TARSAL/METATARSAL BENIGN BONE LESN  12/03/09     HYSTERECTOMY, PAP NO LONGER INDICATED       HYSTERECTOMY, VAGINAL  2005    menorrhagia, normal paps     LAPAROSCOPIC CHOLECYSTECTOMY  2001     LITHOTRIPSY  2007    right kidney     TUBAL LIGATION  2001       Social History     Tobacco Use     Smoking status: Never Smoker     Smokeless tobacco: Never Used   Substance Use Topics     Alcohol use: Yes     Comment: occ glass of wine     Family History   Problem Relation Age of Onset     Thyroid Disease Father      Hypertension Father      Lipids Father      Eye Disorder Mother         glaucoma     Diabetes Mother      Genitourinary Problems Mother         kidney stones     Asthma No family hx of      C.A.D. No family hx of      Cerebrovascular Disease No family hx of      Breast Cancer No family hx of      Cancer - colorectal No family hx of      Prostate Cancer No family hx of      Alzheimer Disease No family hx of      Arthritis No family hx of      Blood Disease No family hx of      Cancer No family hx of      Cardiovascular No family hx of      Circulatory No family hx of      Gastrointestinal Disease No family hx of      Heart Disease No family hx of       Musculoskeletal Disorder No family hx of      Neurologic Disorder No family hx of      Respiratory No family hx of          Current Outpatient Medications   Medication Sig Dispense Refill     aspirin 81 MG chewable tablet Take 1 tablet (81 mg) by mouth daily 1 tablet 0     buPROPion (WELLBUTRIN SR) 150 MG 12 hr tablet Take 1 tablet (150 mg) by mouth daily 90 tablet 0     CALCIUM 600 + D 600-200 MG-UNIT OR TABS takes two tabs per day 3 MONTHS 1 YEAR     CENTRUM SILVER OR TABS 1 TABLET DAILY       Cholecalciferol (VITAMIN D3) 1000 UNIT TABS Take 2 tablets by mouth daily.       hydrochlorothiazide (HYDRODIURIL) 25 MG tablet Take 1 tablet (25 mg) by mouth daily 90 tablet 3     ibuprofen (IBU) 600 MG tablet Take 1 tablet by mouth every 6 hours as needed for pain. 60 tablet 0     lisinopril (PRINIVIL/ZESTRIL) 5 MG tablet TAKE 1 TABLET (5 MG) BY MOUTH DAILY 90 tablet 3     Magnesium 500 MG TABS Take  by mouth.       OMEGA-3 CF 1000 MG OR CAPS   0     PEPCID AC OR None Entered       RESTASIS MULTIDOSE 0.05 % ophthalmic emulsion   2     rizatriptan (MAXALT) 10 MG tablet Take 1 tablet (10 mg) by mouth at onset of headache for migraine May repeat dose in 2 hours.  Do not exceed 30 mg in 24 hours 18 tablet 1     sertraline (ZOLOFT) 100 MG tablet Take 1 tablet (100 mg) by mouth daily 90 tablet 3     traZODone (DESYREL) 100 MG tablet Take 1 tablet (100 mg) by mouth nightly as needed for sleep 90 tablet 3     Vitamin E (TOCOPHEROL) 1000 UNIT capsule Take 3 capsules by mouth daily.       traMADol (ULTRAM) 50 MG tablet Take 1 tablet (50 mg) by mouth every 6 hours as needed for moderate pain 30 tablet 0     Allergies   Allergen Reactions     Sulfa Drugs Nausea and Vomiting     Recent Labs   Lab Test 10/15/18  0857 10/09/17  1158  07/23/15  1058  03/20/15  1223   * 118*  --  121  --   --    HDL 50 57  --  54  --   --    TRIG 139 87  --  100  --   --    CR 0.95 0.92   < >  --    < >  --    GFRESTIMATED 61 64   < >  --    < >  --  "   GFRESTBLACK 74 78   < >  --    < >  --    POTASSIUM 3.9 4.0   < >  --    < >  --    TSH 1.98  --   --   --   --  1.46    < > = values in this interval not displayed.      BP Readings from Last 3 Encounters:   06/21/19 116/70   10/12/18 118/80   08/07/18 116/80    Wt Readings from Last 3 Encounters:   06/21/19 80.7 kg (178 lb)   10/12/18 83.3 kg (183 lb 9.6 oz)   08/07/18 83 kg (183 lb)                    Reviewed and updated as needed this visit by Provider         Review of Systems   ROS COMP: Constitutional, HEENT, cardiovascular, pulmonary, GI, , musculoskeletal, neuro, skin, endocrine and psych systems are negative, except as otherwise noted.      Objective    /70   Pulse 70   Temp 98.4  F (36.9  C) (Temporal)   Resp 16   Ht 1.641 m (5' 4.6\")   Wt 80.7 kg (178 lb)   LMP 07/01/2005   SpO2 97%   BMI 29.99 kg/m    Body mass index is 29.99 kg/m .  Physical Exam   GENERAL: healthy, alert and no distress  NECK: no adenopathy, no asymmetry, masses, or scars and thyroid normal to palpation  RESP: lungs clear to auscultation - no rales, rhonchi or wheezes  CV: regular rate and rhythm, normal S1 S2, no S3 or S4, no murmur, click or rub, no peripheral edema and peripheral pulses strong  MS: no gross musculoskeletal defects noted, no edema  SKIN: 2 inch mobile lump mild tenderness  PSYCH: mentation appears normal, affect normal/bright    Assessment & Plan     1. Mild major depression (H)  Discussed adjunct medication Side effects of medications, proper use, the associated risk/benefits and other options were discussed. Patient understands these risks and agrees to take the medication as instructed. Will f/u in 6 weeks   - buPROPion (WELLBUTRIN SR) 150 MG 12 hr tablet; Take 1 tablet (150 mg) by mouth daily  Dispense: 90 tablet; Refill: 0    2. Pain in joint involving pelvic region and thigh, right  Refill given stable no change in plan  - traMADol (ULTRAM) 50 MG tablet; Take 1 tablet (50 mg) by mouth every " "6 hours as needed for moderate pain  Dispense: 30 tablet; Refill: 0    3. Migraine with aura and with status migrainosus, not intractable  Refill given stable no change in plan  - rizatriptan (MAXALT) 10 MG tablet; Take 1 tablet (10 mg) by mouth at onset of headache for migraine May repeat dose in 2 hours.  Do not exceed 30 mg in 24 hours  Dispense: 18 tablet; Refill: 1    4. Encounter for screening mammogram for breast cancer  She will call to schedule  - MA SCREENING DIGITAL BILAT - Future  (s+30); Future    5. Dermoid cyst of trunk  She will call to schedule  - GENERAL SURG ADULT REFERRAL     BMI:   Estimated body mass index is 29.99 kg/m  as calculated from the following:    Height as of this encounter: 1.641 m (5' 4.6\").    Weight as of this encounter: 80.7 kg (178 lb).   Weight management plan: Discussed healthy diet and exercise guidelines        Patient Instructions   Please follow up in 6 weeks for mood and medication.     Thank you  Sherry Yepez [facility]"

## 2019-06-21 ENCOUNTER — OFFICE VISIT (OUTPATIENT)
Dept: FAMILY MEDICINE | Facility: OTHER | Age: 54
End: 2019-06-21
Payer: COMMERCIAL

## 2019-06-21 VITALS
HEART RATE: 70 BPM | BODY MASS INDEX: 29.66 KG/M2 | HEIGHT: 65 IN | SYSTOLIC BLOOD PRESSURE: 116 MMHG | DIASTOLIC BLOOD PRESSURE: 70 MMHG | WEIGHT: 178 LBS | TEMPERATURE: 98.4 F | OXYGEN SATURATION: 97 % | RESPIRATION RATE: 16 BRPM

## 2019-06-21 DIAGNOSIS — D23.5 DERMOID CYST OF TRUNK: ICD-10-CM

## 2019-06-21 DIAGNOSIS — F32.0 MILD MAJOR DEPRESSION (H): Primary | ICD-10-CM

## 2019-06-21 DIAGNOSIS — Z12.31 ENCOUNTER FOR SCREENING MAMMOGRAM FOR BREAST CANCER: ICD-10-CM

## 2019-06-21 DIAGNOSIS — G43.101 MIGRAINE WITH AURA AND WITH STATUS MIGRAINOSUS, NOT INTRACTABLE: ICD-10-CM

## 2019-06-21 DIAGNOSIS — M25.551 PAIN IN JOINT INVOLVING PELVIC REGION AND THIGH, RIGHT: ICD-10-CM

## 2019-06-21 PROCEDURE — 99213 OFFICE O/P EST LOW 20 MIN: CPT | Performed by: NURSE PRACTITIONER

## 2019-06-21 RX ORDER — RIZATRIPTAN BENZOATE 10 MG/1
10 TABLET ORAL
Qty: 18 TABLET | Refills: 1 | Status: SHIPPED | OUTPATIENT
Start: 2019-06-21 | End: 2021-02-17

## 2019-06-21 RX ORDER — TRAMADOL HYDROCHLORIDE 50 MG/1
50 TABLET ORAL EVERY 6 HOURS PRN
Qty: 30 TABLET | Refills: 0 | Status: SHIPPED | OUTPATIENT
Start: 2019-06-21 | End: 2019-09-13

## 2019-06-21 RX ORDER — BUPROPION HYDROCHLORIDE 150 MG/1
150 TABLET, EXTENDED RELEASE ORAL DAILY
Qty: 90 TABLET | Refills: 0 | Status: SHIPPED | OUTPATIENT
Start: 2019-06-21 | End: 2019-09-19

## 2019-06-21 ASSESSMENT — ANXIETY QUESTIONNAIRES
GAD7 TOTAL SCORE: 2
2. NOT BEING ABLE TO STOP OR CONTROL WORRYING: NOT AT ALL
GAD7 TOTAL SCORE: 2
7. FEELING AFRAID AS IF SOMETHING AWFUL MIGHT HAPPEN: NOT AT ALL
6. BECOMING EASILY ANNOYED OR IRRITABLE: SEVERAL DAYS
GAD7 TOTAL SCORE: 2
1. FEELING NERVOUS, ANXIOUS, OR ON EDGE: NOT AT ALL
7. FEELING AFRAID AS IF SOMETHING AWFUL MIGHT HAPPEN: NOT AT ALL
4. TROUBLE RELAXING: NOT AT ALL
3. WORRYING TOO MUCH ABOUT DIFFERENT THINGS: SEVERAL DAYS
5. BEING SO RESTLESS THAT IT IS HARD TO SIT STILL: NOT AT ALL

## 2019-06-21 ASSESSMENT — PATIENT HEALTH QUESTIONNAIRE - PHQ9
SUM OF ALL RESPONSES TO PHQ QUESTIONS 1-9: 6
10. IF YOU CHECKED OFF ANY PROBLEMS, HOW DIFFICULT HAVE THESE PROBLEMS MADE IT FOR YOU TO DO YOUR WORK, TAKE CARE OF THINGS AT HOME, OR GET ALONG WITH OTHER PEOPLE: SOMEWHAT DIFFICULT
SUM OF ALL RESPONSES TO PHQ QUESTIONS 1-9: 6

## 2019-06-21 ASSESSMENT — MIFFLIN-ST. JEOR: SCORE: 1401.93

## 2019-06-22 ASSESSMENT — ANXIETY QUESTIONNAIRES: GAD7 TOTAL SCORE: 2

## 2019-06-22 ASSESSMENT — PATIENT HEALTH QUESTIONNAIRE - PHQ9: SUM OF ALL RESPONSES TO PHQ QUESTIONS 1-9: 6

## 2019-07-01 ENCOUNTER — TELEPHONE (OUTPATIENT)
Dept: FAMILY MEDICINE | Facility: OTHER | Age: 54
End: 2019-07-01

## 2019-07-01 NOTE — TELEPHONE ENCOUNTER
You placed a referral for patient to general surgery on 6/21/19.  Patient has not scheduled as of yet.      Please review and forward to team if follow up with the patient is needed.     Thank you!  Kristin/Clinic Referrals Dyad II

## 2019-07-01 NOTE — LETTER
[address]   [facility] [address]  Phone: [phone]        July 1, 2019      Antoinette Sherwood                                                                                                                                [address] [facility] [address]            Dear Ms. Clayton Sherwood,    We are concerned about your health care. We received a notice that you are to be scheduled with a specialty clinic. The referral has been placed by your provider and you can call to schedule an appointment directly.     Enclosed, you will find the referral with the phone number to call to schedule an appointment.  If you have already scheduled this, you may disregard this letter.    Please call us if you have any questions or concerns.      Thank you,      Your [facility] Team

## 2019-08-25 DIAGNOSIS — F32.0 MILD MAJOR DEPRESSION (H): ICD-10-CM

## 2019-08-25 NOTE — LETTER
Antoinette Sherwood  [address] [facility] [address]    August 28, 2019      Dear Antoinette Sherwood    APPOINTMENT REMINDER:   Our records indicates that it is time for you to be seen for a recheck.     Your current medication request for Zoloft will be approved for one refill but you will need to be seen before any additional refills can be approved.  Taking care of your health is important to us, and ongoing visits with your provider are vital to your care.    We look forward to seeing you in the near future.  You may call our office at [phone] to schedule a visit.     Please disregard this notice if you have already made an appointment.      Sincerely,    [place] [facility]     [address] [address]  Phone: [phone]

## 2019-08-26 RX ORDER — SERTRALINE HYDROCHLORIDE 100 MG/1
TABLET, FILM COATED ORAL
Qty: 30 TABLET | Refills: 0 | Status: SHIPPED | OUTPATIENT
Start: 2019-08-26 | End: 2019-09-13

## 2019-08-26 NOTE — TELEPHONE ENCOUNTER
Zoloft  Medication is being filled for 1 time refill only due to:  Patient needs to be seen because mood follow up.    Sugar Francois, RN, BSN

## 2019-08-27 NOTE — TELEPHONE ENCOUNTER
LMTC please inform patient that she is due for OV and assist in scheduling   Thanks  Carole Vivas RT (R)

## 2019-08-28 NOTE — TELEPHONE ENCOUNTER
3rd attempt LMTC, letter also sent, please advise of message below    Zoloft  Medication is being filled for 1 time refill only due to:  Patient needs to be seen because mood follow up.    Closing encounter  Carole Vivas RT (R)

## 2019-08-28 NOTE — TELEPHONE ENCOUNTER
Patient called clinic back. I relayed message from below. No call back needed. She will schedule appointment later

## 2019-08-29 ENCOUNTER — HOSPITAL ENCOUNTER (OUTPATIENT)
Dept: MAMMOGRAPHY | Facility: CLINIC | Age: 54
Discharge: HOME | End: 2019-08-29
Attending: NURSE PRACTITIONER | Admitting: NURSE PRACTITIONER
Payer: COMMERCIAL

## 2019-08-29 DIAGNOSIS — Z12.31 VISIT FOR SCREENING MAMMOGRAM: ICD-10-CM

## 2019-08-29 PROCEDURE — 77067 SCR MAMMO BI INCL CAD: CPT

## 2019-08-30 ENCOUNTER — TELEPHONE (OUTPATIENT)
Dept: FAMILY MEDICINE | Facility: OTHER | Age: 54
End: 2019-08-30

## 2019-08-30 NOTE — TELEPHONE ENCOUNTER
Notes recorded by Bernadette Levi CMA on 2019 at 1:34 PM CDT  Left message for patient to return call.     Bernadette Levi MA     ------    Notes recorded by Sherry Yepez APRN CNP on 2019 at 8:44 AM CDT  Please advise Antoinette LEONIDES Sherwood,  1965, that her mammogram results were slight asymmetry left upper breast radiologist recommending ultra sound of left breast please let me know if she would like an order for this.   [phone] (home) [phone] (work)  Thank you  Sherry Yepez CNP

## 2019-09-03 ENCOUNTER — MYC MEDICAL ADVICE (OUTPATIENT)
Dept: FAMILY MEDICINE | Facility: OTHER | Age: 54
End: 2019-09-03

## 2019-09-03 DIAGNOSIS — R92.8 ABNORMAL MAMMOGRAM: Primary | ICD-10-CM

## 2019-09-03 NOTE — TELEPHONE ENCOUNTER
Patient returned call and stated she would like an order to be placed for the imaging mentioned in previous message for left breast. Please let patient know when she can schedule this.

## 2019-09-04 NOTE — TELEPHONE ENCOUNTER
LMTC, please inform patient of message below  Thanks  Carole Vivas RT (R)       Notes recorded by Sherry Yepez, HONG TANG on 2019 at 8:44 AM CDT  Please advise Antoinette Sherwood,  1965, that her mammogram results were slight asymmetry left upper breast radiologist recommending ultra sound of left breast please let me know if she would like an order for this.   [phone] (home) [phone] (work)  Thank you  Sherry Yepez CNP

## 2019-09-04 NOTE — TELEPHONE ENCOUNTER
Left detailed message with phone number to call for scheduling Ultrasound  Closing encounter  Carole Vivas RT (R)

## 2019-09-06 ENCOUNTER — HOSPITAL ENCOUNTER (OUTPATIENT)
Dept: ULTRASOUND IMAGING | Facility: CLINIC | Age: 54
Discharge: HOME | End: 2019-09-06
Attending: NURSE PRACTITIONER | Admitting: NURSE PRACTITIONER
Payer: COMMERCIAL

## 2019-09-06 DIAGNOSIS — R92.8 ABNORMAL MAMMOGRAM: ICD-10-CM

## 2019-09-06 PROCEDURE — 76642 ULTRASOUND BREAST LIMITED: CPT | Mod: LT

## 2019-09-06 NOTE — PATIENT INSTRUCTIONS

## 2019-09-06 NOTE — PROGRESS NOTES
SUBJECTIVE:   CC: Antoinette Sherwood is an 53 year old woman who presents for preventive health visit.   The 10-year ASCVD risk score (Yuri EMERSON Jr., et al., 2013) is: 2.2%    Values used to calculate the score:      Age: 53 years      Sex: Female      Is Non- : No      Diabetic: No      Tobacco smoker: No      Systolic Blood Pressure: 116 mmHg      Is BP treated: Yes      HDL Cholesterol: 50 mg/dL      Total Cholesterol: 199 mg/dL  Patient is eligible for use of low-dose aspirin for primary prevention of heart attack and stroke.  Provider has discussed aspirin with patient and our decision was:  Already taking     Healthy Habits:     Getting at least 3 servings of Calcium per day:  Yes    Bi-annual eye exam:  Yes    Dental care twice a year:  Yes    Sleep apnea or symptoms of sleep apnea:  None    Diet:  Low salt    Frequency of exercise:  2-3 days/week    Duration of exercise:  15-30 minutes    Taking medications regularly:  Yes    Medication side effects:  None    PHQ-2 Total Score: 0    Additional concerns today:  No      Today's PHQ-2 Score:   PHQ-2 ( 1999 [employer]) 6/21/2019   Q1: Little interest or pleasure in doing things 1   Q2: Feeling down, depressed or hopeless 1   PHQ-2 Score 2   Q1: Little interest or pleasure in doing things Several days   Q2: Feeling down, depressed or hopeless Several days   PHQ-2 Score 2       Abuse: Current or Past(Physical, Sexual or Emotional)- No  Do you feel safe in your environment? Yes    Social History     Tobacco Use     Smoking status: Never Smoker     Smokeless tobacco: Never Used   Substance Use Topics     Alcohol use: Yes     Comment: occ glass of wine     If you drink alcohol do you typically have >3 drinks per day or >7 drinks per week? No    Alcohol Use 10/12/2018   Prescreen: >3 drinks/day or >7 drinks/week? No   Prescreen: >3 drinks/day or >7 drinks/week? -       Reviewed orders with patient.  Reviewed health maintenance and updated  orders accordingly - Yes  Labs reviewed in EPIC  BP Readings from Last 3 Encounters:   09/13/19 118/74   06/21/19 116/70   10/12/18 118/80    Wt Readings from Last 3 Encounters:   09/13/19 80.3 kg (177 lb)   06/21/19 80.7 kg (178 lb)   10/12/18 83.3 kg (183 lb 9.6 oz)                  Patient Active Problem List   Diagnosis     CARDIOVASCULAR SCREENING; LDL GOAL LESS THAN 160     Mild major depression (H)     Benign hypertension     Pain of left lower leg     Migraine with aura and without status migrainosus, not intractable     H/O coccidioidomycosis     Past Surgical History:   Procedure Laterality Date     C NONSPECIFIC PROCEDURE  08/96    Left salpingectomy   Abstracted 07/01/02     [facility] AFF SURGICAL PATHOLOGY  2006    breast reduction     EXCISE LESION TRUNK  11/21/2012    Procedure: EXCISE LESION TRUNK;  Excision back mass;  Surgeon: Saqib Abraham MD;  Location: [facility]     HC CORRECT BUNION,METATARSAL OSTEOTOMY  12/03/09    right great toe     HC EXCISION LESION/TENDON-SHEATH/CAPSULE, FOOT  12/03/09     HC REMV TARSAL/METATARSAL BENIGN BONE LESN  12/03/09     HYSTERECTOMY, PAP NO LONGER INDICATED       HYSTERECTOMY, VAGINAL  2005    menorrhagia, normal paps     LAPAROSCOPIC CHOLECYSTECTOMY  2001     LITHOTRIPSY  2007    right kidney     TUBAL LIGATION  2001       Social History     Tobacco Use     Smoking status: Never Smoker     Smokeless tobacco: Never Used   Substance Use Topics     Alcohol use: Yes     Comment: occ glass of wine     Family History   Problem Relation Age of Onset     Thyroid Disease Father      Hypertension Father      Lipids Father      Eye Disorder Mother         glaucoma     Diabetes Mother      Genitourinary Problems Mother         kidney stones     Asthma No family hx of      C.A.D. No family hx of      Cerebrovascular Disease No family hx of      Breast Cancer No family hx of      Cancer - colorectal No family hx of      Prostate Cancer No family hx of      Alzheimer Disease No  family hx of      Arthritis No family hx of      Blood Disease No family hx of      Cancer No family hx of      Cardiovascular No family hx of      Circulatory No family hx of      Gastrointestinal Disease No family hx of      Heart Disease No family hx of      Musculoskeletal Disorder No family hx of      Neurologic Disorder No family hx of      Respiratory No family hx of          Current Outpatient Medications   Medication Sig Dispense Refill     aspirin 81 MG chewable tablet Take 1 tablet (81 mg) by mouth daily 1 tablet 0     buPROPion (WELLBUTRIN SR) 150 MG 12 hr tablet Take 1 tablet (150 mg) by mouth daily 90 tablet 0     CALCIUM 600 + D 600-200 MG-UNIT OR TABS takes two tabs per day 3 MONTHS 1 YEAR     CENTRUM SILVER OR TABS 1 TABLET DAILY       Cholecalciferol (VITAMIN D3) 1000 UNIT TABS Take 2 tablets by mouth daily.       hydrochlorothiazide (HYDRODIURIL) 25 MG tablet Take 1 tablet (25 mg) by mouth daily 90 tablet 3     ibuprofen (IBU) 600 MG tablet Take 1 tablet by mouth every 6 hours as needed for pain. 60 tablet 0     lisinopril (PRINIVIL/ZESTRIL) 5 MG tablet TAKE 1 TABLET (5 MG) BY MOUTH DAILY 90 tablet 3     Magnesium 500 MG TABS Take  by mouth.       OMEGA-3 CF 1000 MG OR CAPS   0     PEPCID AC OR None Entered       RESTASIS MULTIDOSE 0.05 % ophthalmic emulsion   2     rizatriptan (MAXALT) 10 MG tablet Take 1 tablet (10 mg) by mouth at onset of headache for migraine May repeat dose in 2 hours.  Do not exceed 30 mg in 24 hours 18 tablet 1     sertraline (ZOLOFT) 100 MG tablet Take 1 tablet (100 mg) by mouth daily 90 tablet 1     traMADol (ULTRAM) 50 MG tablet Take 1 tablet (50 mg) by mouth every 6 hours as needed for moderate pain 30 tablet 0     traZODone (DESYREL) 100 MG tablet Take 1 tablet (100 mg) by mouth nightly as needed for sleep 90 tablet 3     Vitamin E (TOCOPHEROL) 1000 UNIT capsule Take 3 capsules by mouth daily.       Allergies   Allergen Reactions     Sulfa Drugs Nausea and Vomiting      Recent Labs   Lab Test 10/15/18  0857 10/09/17  1158  07/23/15  1058  03/20/15  1223   * 118*  --  121  --   --    HDL 50 57  --  54  --   --    TRIG 139 87  --  100  --   --    CR 0.95 0.92   < >  --    < >  --    GFRESTIMATED 61 64   < >  --    < >  --    GFRESTBLACK 74 78   < >  --    < >  --    POTASSIUM 3.9 4.0   < >  --    < >  --    TSH 1.98  --   --   --   --  1.46    < > = values in this interval not displayed.        Mammogram Screening: Patient over age 50, mutual decision to screen reflected in health maintenance.    Pertinent mammograms are reviewed under the imaging tab.  History of abnormal Pap smear: Status post benign hysterectomy. Health Maintenance and Surgical History updated.  PAP / HPV 10/29/2008 7/11/2005   PAP NIL OTHER-NIL EM>40     Reviewed and updated as needed this visit by clinical staff         Reviewed and updated as needed this visit by Provider            Review of Systems   Constitutional: Negative for chills and fever.   HENT: Positive for hearing loss. Negative for congestion and ear pain.    Eyes: Negative for pain.   Respiratory: Negative for cough.    Cardiovascular: Negative for chest pain.   Gastrointestinal: Negative for abdominal pain, constipation, diarrhea and hematochezia.   Genitourinary: Negative for frequency, genital sores and hematuria.   Neurological: Negative for dizziness and headaches.   Psychiatric/Behavioral: The patient is not nervous/anxious.           OBJECTIVE:   LMP 07/01/2005   Physical Exam  GENERAL: healthy, alert and no distress  EYES: Eyes grossly normal to inspection, PERRL and conjunctivae and sclerae normal  HENT: ear canals and TM's normal, nose and mouth without ulcers or lesions  NECK: no adenopathy, no asymmetry, masses, or scars and thyroid normal to palpation  RESP: lungs clear to auscultation - no rales, rhonchi or wheezes  BREAST: normal without masses, tenderness or nipple discharge and no palpable axillary masses or  adenopathy  CV: regular rate and rhythm, normal S1 S2, no S3 or S4, no murmur, click or rub, no peripheral edema and peripheral pulses strong  ABDOMEN: soft, nontender, no hepatosplenomegaly, no masses and bowel sounds normal  MS: no gross musculoskeletal defects noted, no edema  SKIN: no suspicious lesions or rashes  NEURO: Normal strength and tone, mentation intact and speech normal  PSYCH: mentation appears normal, affect normal/bright    Diagnostic Test Results:  Labs reviewed in Epic    ASSESSMENT/PLAN:   1. Routine general medical examination at a health care facility  Reviewed recommended screenings and ordered appropriate testing for pt's risks and per pt's request(s).     - Basic metabolic panel; Future  - Lipid panel reflex to direct LDL Fasting; Future    2. Mild major depression (H)  Refill stable  - sertraline (ZOLOFT) 100 MG tablet; Take 1 tablet (100 mg) by mouth daily  Dispense: 90 tablet; Refill: 1    3. Persistent insomnia  Refill stable  - traZODone (DESYREL) 100 MG tablet; Take 1 tablet (100 mg) by mouth nightly as needed for sleep  Dispense: 90 tablet; Refill: 3    4. Pain in joint involving pelvic region and thigh, right  Refill stable due to history of [place] Fever intermittent joint pain  - traMADol (ULTRAM) 50 MG tablet; Take 1 tablet (50 mg) by mouth every 6 hours as needed for moderate pain  Dispense: 30 tablet; Refill: 0    5. Benign hypertension  Stable due for lab   - Basic metabolic panel; Future    6. Mixed hyperlipidemia  Not fasting will return to clinic lab for this  - Lipid panel reflex to direct LDL Fasting; Future    COUNSELING:  Reviewed preventive health counseling, as reflected in patient instructions       Regular exercise       Healthy diet/nutrition       Immunizations    Declined: Influenza due to                Aspirin Prophylaxsis       Colon cancer screening    Estimated body mass index is 29.99 kg/m  as calculated from the following:    Height as of 6/21/19: 1.641 m  "(5' 4.6\").    Weight as of 6/21/19: 80.7 kg (178 lb).    Weight management plan: Discussed healthy diet and exercise guidelines     reports that she has never smoked. She has never used smokeless tobacco.      Counseling Resources:  ATP IV Guidelines  Pooled Cohorts Equation Calculator  Breast Cancer Risk Calculator  FRAX Risk Assessment  ICSI Preventive Guidelines  Dietary Guidelines for Americans, 2010  USDA's MyPlate  ASA Prophylaxis  Lung CA Screening    [facility]  Answers for HPI/ROS submitted by the patient on 9/13/2019   Annual Exam:  PHQ9 TOTAL SCORE: 0  LESLIE 7 TOTAL SCORE: 0    "

## 2019-09-13 ENCOUNTER — OFFICE VISIT (OUTPATIENT)
Dept: FAMILY MEDICINE | Facility: OTHER | Age: 54
End: 2019-09-13
Payer: COMMERCIAL

## 2019-09-13 VITALS
DIASTOLIC BLOOD PRESSURE: 74 MMHG | OXYGEN SATURATION: 96 % | TEMPERATURE: 98.8 F | WEIGHT: 177 LBS | HEART RATE: 78 BPM | BODY MASS INDEX: 29.49 KG/M2 | SYSTOLIC BLOOD PRESSURE: 118 MMHG | RESPIRATION RATE: 16 BRPM | HEIGHT: 65 IN

## 2019-09-13 DIAGNOSIS — M25.551 PAIN IN JOINT INVOLVING PELVIC REGION AND THIGH, RIGHT: ICD-10-CM

## 2019-09-13 DIAGNOSIS — I10 BENIGN HYPERTENSION: ICD-10-CM

## 2019-09-13 DIAGNOSIS — F32.0 MILD MAJOR DEPRESSION (H): ICD-10-CM

## 2019-09-13 DIAGNOSIS — Z00.00 ROUTINE GENERAL MEDICAL EXAMINATION AT A HEALTH CARE FACILITY: Primary | ICD-10-CM

## 2019-09-13 DIAGNOSIS — E78.2 MIXED HYPERLIPIDEMIA: ICD-10-CM

## 2019-09-13 DIAGNOSIS — G47.00 PERSISTENT INSOMNIA: ICD-10-CM

## 2019-09-13 PROCEDURE — 99396 PREV VISIT EST AGE 40-64: CPT | Performed by: NURSE PRACTITIONER

## 2019-09-13 RX ORDER — SERTRALINE HYDROCHLORIDE 100 MG/1
100 TABLET, FILM COATED ORAL DAILY
Qty: 90 TABLET | Refills: 1 | Status: SHIPPED | OUTPATIENT
Start: 2019-09-13 | End: 2020-03-17

## 2019-09-13 RX ORDER — TRAZODONE HYDROCHLORIDE 100 MG/1
100 TABLET ORAL
Qty: 90 TABLET | Refills: 3 | Status: SHIPPED | OUTPATIENT
Start: 2019-09-13 | End: 2020-08-19

## 2019-09-13 RX ORDER — TRAMADOL HYDROCHLORIDE 50 MG/1
50 TABLET ORAL EVERY 6 HOURS PRN
Qty: 30 TABLET | Refills: 0 | Status: SHIPPED | OUTPATIENT
Start: 2019-09-13 | End: 2019-10-25

## 2019-09-13 ASSESSMENT — ANXIETY QUESTIONNAIRES
4. TROUBLE RELAXING: NOT AT ALL
7. FEELING AFRAID AS IF SOMETHING AWFUL MIGHT HAPPEN: NOT AT ALL
GAD7 TOTAL SCORE: 0
2. NOT BEING ABLE TO STOP OR CONTROL WORRYING: NOT AT ALL
GAD7 TOTAL SCORE: 0
6. BECOMING EASILY ANNOYED OR IRRITABLE: NOT AT ALL
7. FEELING AFRAID AS IF SOMETHING AWFUL MIGHT HAPPEN: NOT AT ALL
5. BEING SO RESTLESS THAT IT IS HARD TO SIT STILL: NOT AT ALL
3. WORRYING TOO MUCH ABOUT DIFFERENT THINGS: NOT AT ALL
GAD7 TOTAL SCORE: 0
1. FEELING NERVOUS, ANXIOUS, OR ON EDGE: NOT AT ALL

## 2019-09-13 ASSESSMENT — PATIENT HEALTH QUESTIONNAIRE - PHQ9
SUM OF ALL RESPONSES TO PHQ QUESTIONS 1-9: 0
SUM OF ALL RESPONSES TO PHQ QUESTIONS 1-9: 0

## 2019-09-13 ASSESSMENT — ENCOUNTER SYMPTOMS
HEMATURIA: 0
DIARRHEA: 0
NERVOUS/ANXIOUS: 0
CONSTIPATION: 0
ABDOMINAL PAIN: 0
FREQUENCY: 0
EYE PAIN: 0
FEVER: 0
CHILLS: 0
HEADACHES: 0
DIZZINESS: 0
COUGH: 0
HEMATOCHEZIA: 0

## 2019-09-13 ASSESSMENT — MIFFLIN-ST. JEOR: SCORE: 1397.4

## 2019-09-14 ASSESSMENT — PATIENT HEALTH QUESTIONNAIRE - PHQ9: SUM OF ALL RESPONSES TO PHQ QUESTIONS 1-9: 0

## 2019-09-14 ASSESSMENT — ANXIETY QUESTIONNAIRES: GAD7 TOTAL SCORE: 0

## 2019-09-19 DIAGNOSIS — F32.0 MILD MAJOR DEPRESSION (H): ICD-10-CM

## 2019-09-19 RX ORDER — BUPROPION HYDROCHLORIDE 150 MG/1
TABLET, EXTENDED RELEASE ORAL
Qty: 90 TABLET | Refills: 1 | Status: SHIPPED | OUTPATIENT
Start: 2019-09-19 | End: 2020-03-11

## 2019-09-19 NOTE — TELEPHONE ENCOUNTER
Prescription approved per [facility] Refill Protocol.  Huy Gonzales, RN, BSN

## 2019-09-30 ENCOUNTER — HEALTH MAINTENANCE LETTER (OUTPATIENT)
Age: 54
End: 2019-09-30

## 2019-10-11 DIAGNOSIS — E78.2 MIXED HYPERLIPIDEMIA: ICD-10-CM

## 2019-10-11 DIAGNOSIS — Z00.00 ROUTINE GENERAL MEDICAL EXAMINATION AT A HEALTH CARE FACILITY: ICD-10-CM

## 2019-10-11 DIAGNOSIS — I10 BENIGN HYPERTENSION: ICD-10-CM

## 2019-10-11 LAB
ANION GAP SERPL CALCULATED.3IONS-SCNC: 4 MMOL/L (ref 3–14)
BUN SERPL-MCNC: 17 MG/DL (ref 7–30)
CALCIUM SERPL-MCNC: 9.3 MG/DL (ref 8.5–10.1)
CHLORIDE SERPL-SCNC: 106 MMOL/L (ref 94–109)
CHOLEST SERPL-MCNC: 188 MG/DL
CO2 SERPL-SCNC: 31 MMOL/L (ref 20–32)
CREAT SERPL-MCNC: 0.86 MG/DL (ref 0.52–1.04)
GFR SERPL CREATININE-BSD FRML MDRD: 77 ML/MIN/{1.73_M2}
GLUCOSE SERPL-MCNC: 103 MG/DL (ref 70–99)
HDLC SERPL-MCNC: 58 MG/DL
LDLC SERPL CALC-MCNC: 113 MG/DL
NONHDLC SERPL-MCNC: 130 MG/DL
POTASSIUM SERPL-SCNC: 4 MMOL/L (ref 3.4–5.3)
SODIUM SERPL-SCNC: 141 MMOL/L (ref 133–144)
TRIGL SERPL-MCNC: 85 MG/DL

## 2019-10-11 PROCEDURE — 36415 COLL VENOUS BLD VENIPUNCTURE: CPT | Performed by: NURSE PRACTITIONER

## 2019-10-11 PROCEDURE — 80048 BASIC METABOLIC PNL TOTAL CA: CPT | Performed by: NURSE PRACTITIONER

## 2019-10-11 PROCEDURE — 80061 LIPID PANEL: CPT | Performed by: NURSE PRACTITIONER

## 2019-10-11 NOTE — RESULT ENCOUNTER NOTE
Please advise Antoinette Sherwood,  1965, that her lab results were stable cholesterol panel and metabolic panel normal electrolytes, glucose and kidney function.   [phone] (home) [phone] (work)  Thank you  Sherry Yepez CNP

## 2019-10-21 NOTE — PROGRESS NOTES
Subjective     Antoinette COYLE Clayton Sherwood is a 53 year old female who presents to clinic today for the following health issues:    HPI   Joint Pain    Onset: ongoing for months but worse last few weeks     Description:   Location: left hip  Character: Sharp and Stabbing    Intensity: mild, moderate    Progression of Symptoms: worse    Accompanying Signs & Symptoms:  Other symptoms: stiff feeling only and very painful and radiates to groin    History:   Previous similar pain: YES but years ago    Precipitating factors:   Trauma or overuse: no   Going up stairs or from sitting to standing    Alleviating factors:  Improved by: ibuprofen and tramadol     Therapies Tried and outcome: ibuprofen, tramadol, exercises from PT from previous hip pain    Patient has intermittent on and off hip pain that usually clears with PT stretches, nsaid and ice this time it is getting worse; pain in left hip joint that is radiating to front groin, and posterior gluteal fold.       Patient Active Problem List   Diagnosis     CARDIOVASCULAR SCREENING; LDL GOAL LESS THAN 160     Mild major depression (H)     Benign hypertension     Pain of left lower leg     Migraine with aura and without status migrainosus, not intractable     H/O coccidioidomycosis     Past Surgical History:   Procedure Laterality Date     C NONSPECIFIC PROCEDURE  08/96    Left salpingectomy   Abstracted 07/01/02     [facility] AFF SURGICAL PATHOLOGY  2006    breast reduction     EXCISE LESION TRUNK  11/21/2012    Procedure: EXCISE LESION TRUNK;  Excision back mass;  Surgeon: Saqib Abraham MD;  Location: [facility]     HC CORRECT BUNION,METATARSAL OSTEOTOMY  12/03/09    right great toe     HC EXCISION LESION/TENDON-SHEATH/CAPSULE, FOOT  12/03/09     HC REMV TARSAL/METATARSAL BENIGN BONE LESN  12/03/09     HYSTERECTOMY, PAP NO LONGER INDICATED       HYSTERECTOMY, VAGINAL  2005    menorrhagia, normal paps     LAPAROSCOPIC CHOLECYSTECTOMY  2001     LITHOTRIPSY  2007    right kidney      TUBAL LIGATION  2001       Social History     Tobacco Use     Smoking status: Never Smoker     Smokeless tobacco: Never Used   Substance Use Topics     Alcohol use: Yes     Comment: occ glass of wine     Family History   Problem Relation Age of Onset     Thyroid Disease Father      Hypertension Father      Lipids Father      Eye Disorder Mother         glaucoma     Diabetes Mother      Genitourinary Problems Mother         kidney stones     Asthma No family hx of      C.A.D. No family hx of      Cerebrovascular Disease No family hx of      Breast Cancer No family hx of      Cancer - colorectal No family hx of      Prostate Cancer No family hx of      Alzheimer Disease No family hx of      Arthritis No family hx of      Blood Disease No family hx of      Cancer No family hx of      Cardiovascular No family hx of      Circulatory No family hx of      Gastrointestinal Disease No family hx of      Heart Disease No family hx of      Musculoskeletal Disorder No family hx of      Neurologic Disorder No family hx of      Respiratory No family hx of          Current Outpatient Medications   Medication Sig Dispense Refill     aspirin 81 MG chewable tablet Take 1 tablet (81 mg) by mouth daily 1 tablet 0     buPROPion (WELLBUTRIN SR) 150 MG 12 hr tablet TAKE 1 TABLET BY MOUTH EVERY DAY 90 tablet 1     CALCIUM 600 + D 600-200 MG-UNIT OR TABS takes two tabs per day 3 MONTHS 1 YEAR     CENTRUM SILVER OR TABS 1 TABLET DAILY       Cholecalciferol (VITAMIN D3) 1000 UNIT TABS Take 2 tablets by mouth daily.       hydrochlorothiazide (HYDRODIURIL) 25 MG tablet Take 1 tablet (25 mg) by mouth daily 90 tablet 3     ibuprofen (IBU) 600 MG tablet Take 1 tablet by mouth every 6 hours as needed for pain. 60 tablet 0     lisinopril (PRINIVIL/ZESTRIL) 5 MG tablet TAKE 1 TABLET (5 MG) BY MOUTH DAILY 90 tablet 3     Magnesium 500 MG TABS Take  by mouth.       OMEGA-3 CF 1000 MG OR CAPS   0     PEPCID AC OR None Entered       RESTASIS MULTIDOSE  0.05 % ophthalmic emulsion   2     rizatriptan (MAXALT) 10 MG tablet Take 1 tablet (10 mg) by mouth at onset of headache for migraine May repeat dose in 2 hours.  Do not exceed 30 mg in 24 hours 18 tablet 1     sertraline (ZOLOFT) 100 MG tablet Take 1 tablet (100 mg) by mouth daily 90 tablet 1     traMADol (ULTRAM) 50 MG tablet Take 1 tablet (50 mg) by mouth every 6 hours as needed for moderate pain 30 tablet 0     traZODone (DESYREL) 100 MG tablet Take 1 tablet (100 mg) by mouth nightly as needed for sleep 90 tablet 3     Vitamin E (TOCOPHEROL) 1000 UNIT capsule Take 3 capsules by mouth daily.       Allergies   Allergen Reactions     Sulfa Drugs Nausea and Vomiting     Recent Labs   Lab Test 10/11/19  0832 10/15/18  0857 10/09/17  1158  03/20/15  1223   * 121* 118*   < >  --    HDL 58 50 57   < >  --    TRIG 85 139 87   < >  --    CR 0.86 0.95 0.92   < >  --    GFRESTIMATED 77 61 64   < >  --    GFRESTBLACK 89 74 78   < >  --    POTASSIUM 4.0 3.9 4.0   < >  --    TSH  --  1.98  --   --  1.46    < > = values in this interval not displayed.      BP Readings from Last 3 Encounters:   10/25/19 112/66   09/13/19 118/74   06/21/19 116/70    Wt Readings from Last 3 Encounters:   10/25/19 80.3 kg (177 lb)   09/13/19 80.3 kg (177 lb)   06/21/19 80.7 kg (178 lb)        Reviewed and updated as needed this visit by Provider         Review of Systems   ROS COMP: Constitutional, HEENT, cardiovascular, pulmonary, GI, , musculoskeletal, neuro, skin, endocrine and psych systems are negative, except as otherwise noted.      Objective    /66   Pulse 74   Temp 98  F (36.7  C) (Temporal)   Resp 16   Wt 80.3 kg (177 lb)   LMP 07/01/2005   BMI 29.82 kg/m    Body mass index is 29.82 kg/m .  Physical Exam   GENERAL: healthy, alert and no distress  NECK: no adenopathy, no asymmetry, masses, or scars and thyroid normal to palpation  RESP: lungs clear to auscultation - no rales, rhonchi or wheezes  CV: regular rate and  "rhythm, normal S1 S2, no S3 or S4, no murmur, click or rub, no peripheral edema and peripheral pulses strong  ABDOMEN: soft, nontender, no hepatosplenomegaly, no masses and bowel sounds normal  MS:left hip pain as noted above. There is crepitus with ROM and pain.     Assessment & Plan     1. Pain in joint involving pelvic region and thigh, right    - traMADol (ULTRAM) 50 MG tablet; Take 1 tablet (50 mg) by mouth every 6 hours as needed for moderate pain  Dispense: 30 tablet; Refill: 0    2. Hip pain, left    - traMADol (ULTRAM) 50 MG tablet; Take 1 tablet (50 mg) by mouth every 6 hours as needed for moderate pain  Dispense: 30 tablet; Refill: 0  - XR Hip Left 2-3 Views; Future  - ORTHOPEDICS ADULT REFERRAL    Recommend continue treatment with n-saids and tramadol. Will xray today. Recommend further evaluation through ortho specialist.      BMI:   Estimated body mass index is 29.82 kg/m  as calculated from the following:    Height as of 9/13/19: 1.641 m (5' 4.6\").    Weight as of this encounter: 80.3 kg (177 lb).   Weight management plan: Discussed healthy diet and exercise guidelines        Patient Instructions   I will call you with your xray results.   Please follow up with orthopedic specialty as discussed.     Recommend continue Nsaid and Tramadol for severe pain.     Alternating heat with ice every 2 hours for 15 minutes.     Thank you  Sherry Yepez [facility]    "

## 2019-10-25 ENCOUNTER — OFFICE VISIT (OUTPATIENT)
Dept: FAMILY MEDICINE | Facility: OTHER | Age: 54
End: 2019-10-25
Payer: COMMERCIAL

## 2019-10-25 ENCOUNTER — ANCILLARY PROCEDURE (OUTPATIENT)
Dept: GENERAL RADIOLOGY | Facility: OTHER | Age: 54
End: 2019-10-25
Attending: NURSE PRACTITIONER
Payer: COMMERCIAL

## 2019-10-25 VITALS
WEIGHT: 177 LBS | BODY MASS INDEX: 29.82 KG/M2 | SYSTOLIC BLOOD PRESSURE: 112 MMHG | DIASTOLIC BLOOD PRESSURE: 66 MMHG | RESPIRATION RATE: 16 BRPM | TEMPERATURE: 98 F | HEART RATE: 74 BPM

## 2019-10-25 DIAGNOSIS — M25.552 HIP PAIN, LEFT: ICD-10-CM

## 2019-10-25 DIAGNOSIS — M25.552 HIP PAIN, LEFT: Primary | ICD-10-CM

## 2019-10-25 DIAGNOSIS — M25.551 PAIN IN JOINT INVOLVING PELVIC REGION AND THIGH, RIGHT: ICD-10-CM

## 2019-10-25 PROCEDURE — 73502 X-RAY EXAM HIP UNI 2-3 VIEWS: CPT | Mod: FY

## 2019-10-25 PROCEDURE — 99213 OFFICE O/P EST LOW 20 MIN: CPT | Performed by: NURSE PRACTITIONER

## 2019-10-25 RX ORDER — TRAMADOL HYDROCHLORIDE 50 MG/1
50 TABLET ORAL EVERY 6 HOURS PRN
Qty: 30 TABLET | Refills: 0 | Status: SHIPPED | OUTPATIENT
Start: 2019-10-25 | End: 2020-03-11

## 2019-10-25 ASSESSMENT — PAIN SCALES - GENERAL: PAINLEVEL: EXTREME PAIN (8)

## 2019-10-25 NOTE — PATIENT INSTRUCTIONS
I will call you with your xray results.   Please follow up with orthopedic specialty as discussed.     Recommend continue Nsaid and Tramadol for severe pain.     Alternating heat with ice every 2 hours for 15 minutes.     Thank you  Sherry Yepez CNP

## 2019-10-28 ENCOUNTER — TELEPHONE (OUTPATIENT)
Dept: FAMILY MEDICINE | Facility: OTHER | Age: 54
End: 2019-10-28

## 2019-10-28 NOTE — TELEPHONE ENCOUNTER
----- Message from HONG Monsalve CNP sent at 10/28/2019  1:05 PM CDT -----  Please advise Antoinette Sherwood,  1965, that her xray results were normal hip joint. Continue treatment plan as discussed.   [phone] (home) [phone] (work)  Thank you  Sherry Yepez CNP

## 2019-10-28 NOTE — RESULT ENCOUNTER NOTE
Please advise Antoinette Sherwood,  1965, that her xray results were normal hip joint. Continue treatment plan as discussed.   [phone] (home) [phone] (work)  Thank you  Sherry Yepez CNP

## 2019-11-04 ENCOUNTER — TRANSFERRED RECORDS (OUTPATIENT)
Dept: HEALTH INFORMATION MANAGEMENT | Facility: CLINIC | Age: 54
End: 2019-11-04

## 2019-11-08 ENCOUNTER — ANCILLARY PROCEDURE (OUTPATIENT)
Dept: GENERAL RADIOLOGY | Facility: OTHER | Age: 54
End: 2019-11-08
Attending: PHYSICAL MEDICINE & REHABILITATION
Payer: COMMERCIAL

## 2019-11-08 ENCOUNTER — OFFICE VISIT (OUTPATIENT)
Dept: ORTHOPEDICS | Facility: OTHER | Age: 54
End: 2019-11-08
Payer: COMMERCIAL

## 2019-11-08 VITALS
HEIGHT: 65 IN | DIASTOLIC BLOOD PRESSURE: 72 MMHG | WEIGHT: 178.1 LBS | BODY MASS INDEX: 29.67 KG/M2 | SYSTOLIC BLOOD PRESSURE: 102 MMHG

## 2019-11-08 DIAGNOSIS — R22.33 LUMP ON FINGER OF BOTH HANDS: ICD-10-CM

## 2019-11-08 DIAGNOSIS — M70.62 GREATER TROCHANTERIC BURSITIS, LEFT: Primary | ICD-10-CM

## 2019-11-08 DIAGNOSIS — M20.002 FINGER DEFORMITY, ACQUIRED, LEFT: ICD-10-CM

## 2019-11-08 PROCEDURE — 99244 OFF/OP CNSLTJ NEW/EST MOD 40: CPT | Mod: 25 | Performed by: PHYSICAL MEDICINE & REHABILITATION

## 2019-11-08 PROCEDURE — 20610 DRAIN/INJ JOINT/BURSA W/O US: CPT | Mod: LT | Performed by: PHYSICAL MEDICINE & REHABILITATION

## 2019-11-08 PROCEDURE — 73130 X-RAY EXAM OF HAND: CPT | Mod: RT

## 2019-11-08 RX ORDER — TRIAMCINOLONE ACETONIDE 40 MG/ML
40 INJECTION, SUSPENSION INTRA-ARTICULAR; INTRAMUSCULAR
Status: SHIPPED | OUTPATIENT
Start: 2019-11-08

## 2019-11-08 RX ADMIN — TRIAMCINOLONE ACETONIDE 40 MG: 40 INJECTION, SUSPENSION INTRA-ARTICULAR; INTRAMUSCULAR at 14:20

## 2019-11-08 ASSESSMENT — MIFFLIN-ST. JEOR: SCORE: 1402.39

## 2019-11-08 NOTE — PROGRESS NOTES
Sports Medicine Clinic Visit    PCP: Sherry Yepez    CC: Patient presents with:  Left Hip - Pain      HPI:  Antoinette Sherwood is a 53 year old female who is seen in consultation at the request of Sherry Yepez CNP.   She notes left hip pain that began a few months ago. She notes pain over the lateral hip with occasional pain over the anterior hip. She rates the pain at a  10/10 at its worst and a 4/10 currently.  Symptoms are relieved with Ibuprofen and Tramadol at night. Symptoms are worsened by lying on the left side, sit to stand transition, and stairs. She endorses pain.   She denies swelling, bruising, popping, grinding, catching, locking, instability and weakness.  Other treatment has included cold compresses, heat, and stretching.     She also notes lumps on the left index finger, right index finger, and right thumb.      Review of Systems:  Musculoskeletal: as above  Remainder of review of systems is negative including constitutional, eyes, ENT, CV, pulmonary, GI, , endocrine, skin, hematologic, and neurologic except as noted in HPI or medical history.    History reviewed. No pertinent past surgical/medical/family/social history other than as mentioned in HPI.    Patient Active Problem List   Diagnosis     CARDIOVASCULAR SCREENING; LDL GOAL LESS THAN 160     Mild major depression (H)     Benign hypertension     Pain of left lower leg     Migraine with aura and without status migrainosus, not intractable     H/O coccidioidomycosis     Past Medical History:   Diagnosis Date     Anxiety      Calculus of kidney     Abstracted 07/01/02     Hypertension      Past Surgical History:   Procedure Laterality Date     C NONSPECIFIC PROCEDURE  08/96    Left salpingectomy   Abstracted 07/01/02     [facility] AFF SURGICAL PATHOLOGY  2006    breast reduction     EXCISE LESION TRUNK  11/21/2012    Procedure: EXCISE LESION TRUNK;  Excision back mass;  Surgeon: Saqib Abraham MD;  Location:  OR       CORRECT BUNION,METATARSAL OSTEOTOMY  12/03/09    right great toe     HC EXCISION LESION/TENDON-SHEATH/CAPSULE, FOOT  12/03/09     HC REMV TARSAL/METATARSAL BENIGN BONE LESN  12/03/09     HYSTERECTOMY, PAP NO LONGER INDICATED       HYSTERECTOMY, VAGINAL  2005    menorrhagia, normal paps     LAPAROSCOPIC CHOLECYSTECTOMY  2001     LITHOTRIPSY  2007    right kidney     TUBAL LIGATION  2001     Family History   Problem Relation Age of Onset     Thyroid Disease Father      Hypertension Father      Lipids Father      Eye Disorder Mother         glaucoma     Diabetes Mother      Genitourinary Problems Mother         kidney stones     Asthma No family hx of      C.A.D. No family hx of      Cerebrovascular Disease No family hx of      Breast Cancer No family hx of      Cancer - colorectal No family hx of      Prostate Cancer No family hx of      Alzheimer Disease No family hx of      Arthritis No family hx of      Blood Disease No family hx of      Cancer No family hx of      Cardiovascular No family hx of      Circulatory No family hx of      Gastrointestinal Disease No family hx of      Heart Disease No family hx of      Musculoskeletal Disorder No family hx of      Neurologic Disorder No family hx of      Respiratory No family hx of      Social History     Socioeconomic History     Marital status:      Spouse name: Not on file     Number of children: Not on file     Years of education: Not on file     Highest education level: Not on file   Occupational History     Not on file   Social Needs     Financial resource strain: Not on file     Food insecurity:     Worry: Not on file     Inability: Not on file     Transportation needs:     Medical: Not on file     Non-medical: Not on file   Tobacco Use     Smoking status: Never Smoker     Smokeless tobacco: Never Used   Substance and Sexual Activity     Alcohol use: Yes     Comment: occ glass of wine     Drug use: No     Sexual activity: Yes     Partners: Male     Birth  "control/protection: Surgical   Lifestyle     Physical activity:     Days per week: Not on file     Minutes per session: Not on file     Stress: Not on file   Relationships     Social connections:     Talks on phone: Not on file     Gets together: Not on file     Attends Ten[employer] service: Not on file     Active member of club or organization: Not on file     Attends meetings of clubs or organizations: Not on file     Relationship status: Not on file     Intimate partner violence:     Fear of current or ex partner: Not on file     Emotionally abused: Not on file     Physically abused: Not on file     Forced sexual activity: Not on file   Other Topics Concern     Parent/sibling w/ CABG, MI or angioplasty before 65F 55M? 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[employer]    Current Outpatient Medications   Medication     aspirin 81 MG chewable tablet     buPROPion (WELLBUTRIN SR) 150 MG 12 hr tablet     CALCIUM 600 + D 600-200 MG-UNIT OR TABS     CENTRUM SILVER OR TABS     Cholecalciferol (VITAMIN D3) 1000 UNIT TABS     hydrochlorothiazide (HYDRODIURIL) 25 MG tablet     ibuprofen (IBU) 600 MG tablet     lisinopril (PRINIVIL/ZESTRIL) 5 MG tablet     Magnesium 500 MG TABS     OMEGA-3 CF 1000 MG OR CAPS     PEPCID AC OR     RESTASIS MULTIDOSE 0.05 % ophthalmic emulsion     rizatriptan (MAXALT) 10 MG tablet     sertraline (ZOLOFT) 100 MG tablet     traMADol (ULTRAM) 50 MG tablet     traZODone (DESYREL) 100 MG tablet     Vitamin E (TOCOPHEROL) 1000 UNIT capsule     No current facility-administered medications for this visit.      Allergies   Allergen Reactions     Sulfa Drugs Nausea and Vomiting         Objective:  /72   Ht 1.641 m (5' 4.6\")   Wt 80.8 kg (178 lb 1.6 oz)   LMP 07/01/2005   BMI 30.01 kg/m      General: Alert and in no distress    Head: Normocephalic, atraumatic  Eyes: no scleral icterus or conjunctival erythema   Oropharynx:  Mucous membranes moist  Skin: no " erythema, petechiae, or jaundice  CV: regular rhythm by palpation, 2+ distal pulses  Resp: normal respiratory effort without conversational dyspnea   Psych: normal mood and affect    Gait: Non-antalgic, appropriate coordination and balance   Neuro: Motor strength and sensation as noted below    Musculoskeletal:    Bilateral hip exam    Inspection: No obvious deformitiy    Palpation:  -Tender over the left greater trochanteric bursa    ROM:     Full active and passive ROM bilaterally.  Left hip internal rotation causes left posterior thigh pain.    Strength:   Left:  5/5 hip flexion/abduction/adduction, 5/5 knee extension/flexion, 5/5 ankle dorsiflexion/plantarflexion  Right:  5/5 hip flexion/abduction/adduction, 5/5 knee extension/flexion, 5/5 ankle dorsiflexion/plantarflexion    Sensation: grossly intact to light touch in the lower extremities bilaterally      Bilateral Wrist and Hand exam    Inspection:  -Cysts on DIP of bilateral index fingers and IP joint of right thumb  -Ulnar deviation of left index finger distal phalanx    Palpation:  -Tender over the cysts as above    ROM:       Full and symmetric active range of motion of the forearm, wrist and digits bilaterally    Strength:  Forearm supination 5/5 bilaterally  Forearm pronation 5/5 bilaterally  Wrist extension 5/5 bilaterally  Wrist flexion 5/5 bilaterally   strength 5/5 bilaterally  Finger abduction 5/5 bilaterally    Neurovascular:       2+ radial pulses bilaterally and normal sensation to light touch in the radial, median and ulnar nerve distributions      Radiology:  Hand x-rays ordered and independent visualization of images performed and reviewed with Antoinette.    Recent Results (from the past 744 hour(s))   XR Hip Left 2-3 Views    Narrative    HIP LEFT TWO TO THREE VIEWS 10/25/2019 2:42 PM     HISTORY: Hip pain, left.    COMPARISON: 6/2/2014.      Impression    IMPRESSION: No bony or soft tissue abnormality and no change since the  prior  exam.    JORDAN WELLS MD   XR Hand Bilateral G/E 3 Views    Narrative    HAND BILATERAL THREE OR MORE VIEWS    11/8/2019 1:41 PM     HISTORY:  Lump on finger of both hands    FINDINGS:    Right: Mild osteoarthrosis at the STT joint and first CMC joint. Mild  osteoarthrosis in the MCP joints. Mild to moderate osteoarthrosis of  the interphalangeal joints of the fingers and thumb. No erosions. No  periarticular osteopenia or periostitis. No soft tissue swelling or  soft tissue calcification.    Left: Mild osteoarthrosis at the STT joint and first CMC joint. Mild  osteoarthrosis in the MCP joints. There is also osteoarthrosis of the  interphalangeal joints of the fingers and thumb. There is deformity of  the base of the distal phalanx of the index finger likely related to  old trauma. No erosions. No periarticular osteopenia or periostitis.  No soft tissue swelling or soft tissue calcification.      Impression    IMPRESSION: Osteoarthrosis. No evidence of inflammatory arthropathy.       Large Joint Injection/Arthocentesis: L greater trochanteric bursa  Date/Time: 11/8/2019 2:20 PM  Performed by: Merry Dove MD  Authorized by: Merry Dove MD     Indications:  Pain  Needle Size:  22 G  Guidance: landmark guided    Approach:  Lateral  Location:  Hip      Site:  L greater trochanteric bursa  Medications:  40 mg triamcinolone 40 MG/ML  Medications comment:  6ml 0.5% bupivicaine  NDC:6729-0626-54  Lot: [id number]  8/1/20    Outcome:  Tolerated well, no immediate complications  Procedure discussed: discussed risks, benefits, and alternatives    Consent Given by:  Patient          Assessment:  1. Greater trochanteric bursitis, left    2. Lump on finger of both hands    3. Finger deformity, acquired, left        Plan:  Discussed the assessment with the patient and developed a plan together:  -Steroid injection performed today.  Take it easy over the next few days. Keep in mind that the steroid may  take up to 3 days to start working and up to 2 weeks to reach maximal effect.  Ice 15-20 minutes as needed for soreness.  Patient's preferred over the counter medication as needed for pain as directed on packaging.  -Provided home exercises. Please do multiple times per day.  -Avoid aggravating activities    -She has degenerative changes in her hands, but no evidence of inflammatory arthritis on radiographs.  That being said, rheumatology may still be beneficial to see.  Also discussed hand therapy and hand specialsit referral.  Recommend she avoid aggravating repetitive activities of the hands.  Rheumatology referral placed.    Follow Up: As needed if symptoms fail to improve or worsen. Please call with any questions or concerns.     Giovanna Dove MD, CAQ Sports Medicine  [facility]

## 2019-11-08 NOTE — PATIENT INSTRUCTIONS
Today's Plan of Care:  -Steroid injection performed today.  Take it easy over the next few days. Keep in mind that the steroid may take up to 3 days to start working and up to 2 weeks to reach maximal effect.  Ice 15-20 minutes as needed for soreness.  Patient's preferred over the counter medication as needed for pain as directed on packaging.  -Provided home exercises. Please do multiple times per day.  -Avoid aggravating activities    Rheumatology referral for the hands    Follow Up: As needed if symptoms fail to improve or worsen. Please call with any questions or concerns.

## 2019-11-08 NOTE — LETTER
11/8/2019         RE: Antoinette Sherwood  [address] [address] [facility] [address] [facility] [id number]        Dear Colleague,    Thank you for referring your patient, Antoinette Sherwood, to the [facility]. Please see a copy of my visit note below.    Sports Medicine Clinic Visit    PCP: Sherry Yepez    CC: Patient presents with:  Left Hip - Pain      HPI:  Antoinette Sherwood is a 53 year old female who is seen in consultation at the request of Sherry Yepez CNP.   She notes left hip pain that began a few months ago. She notes pain over the lateral hip with occasional pain over the anterior hip. She rates the pain at a  10/10 at its worst and a 4/10 currently.  Symptoms are relieved with Ibuprofen and Tramadol at night. Symptoms are worsened by lying on the left side, sit to stand transition, and stairs. She endorses pain.   She denies swelling, bruising, popping, grinding, catching, locking, instability and weakness.  Other treatment has included cold compresses, heat, and stretching.     She also notes lumps on the left index finger, right index finger, and right thumb.      Review of Systems:  Musculoskeletal: as above  Remainder of review of systems is negative including constitutional, eyes, ENT, CV, pulmonary, GI, , endocrine, skin, hematologic, and neurologic except as noted in HPI or medical history.    History reviewed. No pertinent past surgical/medical/family/social history other than as mentioned in HPI.    Patient Active Problem List   Diagnosis     CARDIOVASCULAR SCREENING; LDL GOAL LESS THAN 160     Mild major depression (H)     Benign hypertension     Pain of left lower leg     Migraine with aura and without status migrainosus, not intractable     H/O coccidioidomycosis     Past Medical History:   Diagnosis Date     Anxiety      Calculus of kidney     Abstracted 07/01/02     Hypertension      Past Surgical History:   Procedure Laterality Date     C NONSPECIFIC  PROCEDURE  08/96    Left salpingectomy   Abstracted 07/01/02     [facility] AFF SURGICAL PATHOLOGY  2006    breast reduction     EXCISE LESION TRUNK  11/21/2012    Procedure: EXCISE LESION TRUNK;  Excision back mass;  Surgeon: Saqib Abraham MD;  Location: [facility] OR     HC CORRECT BUNION,METATARSAL OSTEOTOMY  12/03/09    right great toe     HC EXCISION LESION/TENDON-SHEATH/CAPSULE, FOOT  12/03/09     HC REMV TARSAL/METATARSAL BENIGN BONE LESN  12/03/09     HYSTERECTOMY, PAP NO LONGER INDICATED       HYSTERECTOMY, VAGINAL  2005    menorrhagia, normal paps     LAPAROSCOPIC CHOLECYSTECTOMY  2001     LITHOTRIPSY  2007    right kidney     TUBAL LIGATION  2001     Family History   Problem Relation Age of Onset     Thyroid Disease Father      Hypertension Father      Lipids Father      Eye Disorder Mother         glaucoma     Diabetes Mother      Genitourinary Problems Mother         kidney stones     Asthma No family hx of      C.A.D. No family hx of      Cerebrovascular Disease No family hx of      Breast Cancer No family hx of      Cancer - colorectal No family hx of      Prostate Cancer No family hx of      Alzheimer Disease No family hx of      Arthritis No family hx of      Blood Disease No family hx of      Cancer No family hx of      Cardiovascular No family hx of      Circulatory No family hx of      Gastrointestinal Disease No family hx of      Heart Disease No family hx of      Musculoskeletal Disorder No family hx of      Neurologic Disorder No family hx of      Respiratory No family hx of      Social History     Socioeconomic History     Marital status:      Spouse name: Not on file     Number of children: Not on file     Years of education: Not on file     Highest education level: Not on file   Occupational History     Not on file   Social Needs     Financial resource strain: Not on file     Food insecurity:     Worry: Not on file     Inability: Not on file     Transportation needs:     Medical: Not on file      Non-medical: Not on file   Tobacco Use     Smoking status: Never Smoker     Smokeless tobacco: Never Used   Substance and Sexual Activity     Alcohol use: Yes     Comment: occ glass of wine     Drug use: No     Sexual activity: Yes     Partners: Male     Birth control/protection: Surgical   Lifestyle     Physical activity:     Days per week: Not on file     Minutes per session: Not on file     Stress: Not on file   Relationships     Social connections:     Talks on phone: Not on file     Gets together: Not on file     Attends [employer] service: Not on file     Active member of club or organization: Not on file     Attends meetings of clubs or organizations: Not on file     Relationship status: Not on file     Intimate partner violence:     Fear of current or ex partner: Not on file     Emotionally abused: Not on file     Physically abused: Not on file     Forced sexual activity: Not on file   Other Topics Concern     Parent/sibling w/ CABG, MI or angioplasty before 65F 55M? 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[employer]    Current Outpatient Medications   Medication     aspirin 81 MG chewable tablet     buPROPion (WELLBUTRIN SR) 150 MG 12 hr tablet     CALCIUM 600 + D 600-200 MG-UNIT OR TABS     CENTRUM SILVER OR TABS     Cholecalciferol (VITAMIN D3) 1000 UNIT TABS     hydrochlorothiazide (HYDRODIURIL) 25 MG tablet     ibuprofen (IBU) 600 MG tablet     lisinopril (PRINIVIL/ZESTRIL) 5 MG tablet     Magnesium 500 MG TABS     OMEGA-3 CF 1000 MG OR CAPS     PEPCID AC OR     RESTASIS MULTIDOSE 0.05 % ophthalmic emulsion     rizatriptan (MAXALT) 10 MG tablet     sertraline (ZOLOFT) 100 MG tablet     traMADol (ULTRAM) 50 MG tablet     traZODone (DESYREL) 100 MG tablet     Vitamin E (TOCOPHEROL) 1000 UNIT capsule     No current facility-administered medications for this visit.      Allergies   Allergen Reactions     Sulfa Drugs Nausea and Vomiting         Objective:  /72   Ht  "1.641 m (5' 4.6\")   Wt 80.8 kg (178 lb 1.6 oz)   LMP 07/01/2005   BMI 30.01 kg/m       General: Alert and in no distress    Head: Normocephalic, atraumatic  Eyes: no scleral icterus or conjunctival erythema   Oropharynx:  Mucous membranes moist  Skin: no erythema, petechiae, or jaundice  CV: regular rhythm by palpation, 2+ distal pulses  Resp: normal respiratory effort without conversational dyspnea   Psych: normal mood and affect    Gait: Non-antalgic, appropriate coordination and balance   Neuro: Motor strength and sensation as noted below    Musculoskeletal:    Bilateral hip exam    Inspection: No obvious deformitiy    Palpation:  -Tender over the left greater trochanteric bursa    ROM:     Full active and passive ROM bilaterally.  Left hip internal rotation causes left posterior thigh pain.    Strength:   Left:  5/5 hip flexion/abduction/adduction, 5/5 knee extension/flexion, 5/5 ankle dorsiflexion/plantarflexion  Right:  5/5 hip flexion/abduction/adduction, 5/5 knee extension/flexion, 5/5 ankle dorsiflexion/plantarflexion    Sensation: grossly intact to light touch in the lower extremities bilaterally      Bilateral Wrist and Hand exam    Inspection:  -Cysts on DIP of bilateral index fingers and IP joint of right thumb  -Ulnar deviation of left index finger distal phalanx    Palpation:  -Tender over the cysts as above    ROM:       Full and symmetric active range of motion of the forearm, wrist and digits bilaterally    Strength:  Forearm supination 5/5 bilaterally  Forearm pronation 5/5 bilaterally  Wrist extension 5/5 bilaterally  Wrist flexion 5/5 bilaterally   strength 5/5 bilaterally  Finger abduction 5/5 bilaterally    Neurovascular:       2+ radial pulses bilaterally and normal sensation to light touch in the radial, median and ulnar nerve distributions      Radiology:  Hand x-rays ordered and independent visualization of images performed and reviewed with Antoinette.    Recent Results (from the " past 744 hour(s))   XR Hip Left 2-3 Views    Narrative    HIP LEFT TWO TO THREE VIEWS 10/25/2019 2:42 PM     HISTORY: Hip pain, left.    COMPARISON: 6/2/2014.      Impression    IMPRESSION: No bony or soft tissue abnormality and no change since the  prior exam.    JORDAN WELLS MD   XR Hand Bilateral G/E 3 Views    Narrative    HAND BILATERAL THREE OR MORE VIEWS    11/8/2019 1:41 PM     HISTORY:  Lump on finger of both hands    FINDINGS:    Right: Mild osteoarthrosis at the STT joint and first CMC joint. Mild  osteoarthrosis in the MCP joints. Mild to moderate osteoarthrosis of  the interphalangeal joints of the fingers and thumb. No erosions. No  periarticular osteopenia or periostitis. No soft tissue swelling or  soft tissue calcification.    Left: Mild osteoarthrosis at the STT joint and first CMC joint. Mild  osteoarthrosis in the MCP joints. There is also osteoarthrosis of the  interphalangeal joints of the fingers and thumb. There is deformity of  the base of the distal phalanx of the index finger likely related to  old trauma. No erosions. No periarticular osteopenia or periostitis.  No soft tissue swelling or soft tissue calcification.      Impression    IMPRESSION: Osteoarthrosis. No evidence of inflammatory arthropathy.       Large Joint Injection/Arthocentesis: L greater trochanteric bursa  Date/Time: 11/8/2019 2:20 PM  Performed by: Merry Dove MD  Authorized by: Merry Dove MD     Indications:  Pain  Needle Size:  22 G  Guidance: landmark guided    Approach:  Lateral  Location:  Hip      Site:  L greater trochanteric bursa  Medications:  40 mg triamcinolone 40 MG/ML  Medications comment:  6ml 0.5% bupivicaine  NDC:3539-2371-56  Lot: [id number]  8/1/20    Outcome:  Tolerated well, no immediate complications  Procedure discussed: discussed risks, benefits, and alternatives    Consent Given by:  Patient          Assessment:  1. Greater trochanteric bursitis, left    2. Lump on  finger of both hands    3. Finger deformity, acquired, left        Plan:  Discussed the assessment with the patient and developed a plan together:  -Steroid injection performed today.  Take it easy over the next few days. Keep in mind that the steroid may take up to 3 days to start working and up to 2 weeks to reach maximal effect.  Ice 15-20 minutes as needed for soreness.  Patient's preferred over the counter medication as needed for pain as directed on packaging.  -Provided home exercises. Please do multiple times per day.  -Avoid aggravating activities    -Rheumatology referral for the hands given cysts, ulnar deviation, and appearance on radiographs    Follow Up: As needed if symptoms fail to improve or worsen. Please call with any questions or concerns.     Giovanna Dove MD, [facility] Sports Medicine  [facility]    Again, thank you for allowing me to participate in the care of your patient.        Sincerely,        Merry Dove MD

## 2019-11-30 DIAGNOSIS — I10 HTN, GOAL BELOW 140/80: ICD-10-CM

## 2019-11-30 DIAGNOSIS — I10 BENIGN HYPERTENSION: ICD-10-CM

## 2019-12-02 RX ORDER — LISINOPRIL 5 MG/1
TABLET ORAL
Qty: 90 TABLET | Refills: 3 | Status: SHIPPED | OUTPATIENT
Start: 2019-12-02 | End: 2020-12-10

## 2019-12-02 RX ORDER — HYDROCHLOROTHIAZIDE 25 MG/1
TABLET ORAL
Qty: 90 TABLET | Refills: 3 | Status: SHIPPED | OUTPATIENT
Start: 2019-12-02 | End: 2020-12-10

## 2020-03-09 NOTE — PROGRESS NOTES
Subjective     Antoinette COYLE Clayton Sherwood is a 54 year old female who presents to clinic today for the following health issues:    HPI   Concern - Recurrent Left hip pain     Onset: Last week    Description:   Had an injection in hip last Nov and the hip felt so much better but last week out of the blue is started to hurt again. Pt walking with slight limp. Lifting leg, putting leg out to the side and lying on her left side is very aggravating.     Therapies Tried and outcome: ibuprofen rest.           PROBLEMS TO ADD ON...refill of Wellbutrin sr 150 mg daily for mood and Tramadol 50 mg she takes for pain that flare intermittently in pelvic and right thigh.         Patient Active Problem List   Diagnosis     CARDIOVASCULAR SCREENING; LDL GOAL LESS THAN 160     Mild major depression (H)     Benign hypertension     Pain of left lower leg     Migraine with aura and without status migrainosus, not intractable     H/O coccidioidomycosis     Past Surgical History:   Procedure Laterality Date     C NONSPECIFIC PROCEDURE  08/96    Left salpingectomy   Abstracted 07/01/02     [facility]  2006    breast reduction     EXCISE LESION TRUNK  11/21/2012    Procedure: EXCISE LESION TRUNK;  Excision back mass;  Surgeon: Saqib Abraham MD;  Location: [facility]     HC CORRECT BUNION,METATARSAL OSTEOTOMY  12/03/09    right great toe     HC EXCISION LESION/TENDON-SHEATH/CAPSULE, FOOT  12/03/09     HC REMV TARSAL/METATARSAL BENIGN BONE LESN  12/03/09     HYSTERECTOMY, PAP NO LONGER INDICATED       HYSTERECTOMY, VAGINAL  2005    menorrhagia, normal paps     LAPAROSCOPIC CHOLECYSTECTOMY  2001     LITHOTRIPSY  2007    right kidney     TUBAL LIGATION  2001       Social History     Tobacco Use     Smoking status: Never Smoker     Smokeless tobacco: Never Used   Substance Use Topics     Alcohol use: Yes     Comment: occ glass of wine     Family History   Problem Relation Age of Onset     Thyroid Disease Father      Hypertension  Father      Lipids Father      Eye Disorder Mother         glaucoma     Diabetes Mother      Genitourinary Problems Mother         kidney stones     Asthma No family hx of      C.A.D. No family hx of      Cerebrovascular Disease No family hx of      Breast Cancer No family hx of      Cancer - colorectal No family hx of      Prostate Cancer No family hx of      Alzheimer Disease No family hx of      Arthritis No family hx of      Blood Disease No family hx of      Cancer No family hx of      Cardiovascular No family hx of      Circulatory No family hx of      Gastrointestinal Disease No family hx of      Heart Disease No family hx of      Musculoskeletal Disorder No family hx of      Neurologic Disorder No family hx of      Respiratory No family hx of          Current Outpatient Medications   Medication Sig Dispense Refill     aspirin 81 MG chewable tablet Take 1 tablet (81 mg) by mouth daily 1 tablet 0     buPROPion (WELLBUTRIN SR) 150 MG 12 hr tablet Take 1 tablet (150 mg) by mouth daily 90 tablet 3     CALCIUM 600 + D 600-200 MG-UNIT OR TABS takes two tabs per day 3 MONTHS 1 YEAR     CENTRUM SILVER OR TABS 1 TABLET DAILY       Cholecalciferol (VITAMIN D3) 1000 UNIT TABS Take 2 tablets by mouth daily.       hydrochlorothiazide (HYDRODIURIL) 25 MG tablet TAKE 1 TABLET BY MOUTH EVERY DAY 90 tablet 3     ibuprofen (IBU) 600 MG tablet Take 1 tablet by mouth every 6 hours as needed for pain. 60 tablet 0     lisinopril (PRINIVIL/ZESTRIL) 5 MG tablet TAKE 1 TABLET BY MOUTH EVERY DAY 90 tablet 3     Magnesium 500 MG TABS Take  by mouth.       OMEGA-3 CF 1000 MG OR CAPS   0     PEPCID AC OR None Entered       RESTASIS MULTIDOSE 0.05 % ophthalmic emulsion   2     rizatriptan (MAXALT) 10 MG tablet Take 1 tablet (10 mg) by mouth at onset of headache for migraine May repeat dose in 2 hours.  Do not exceed 30 mg in 24 hours 18 tablet 1     sertraline (ZOLOFT) 100 MG tablet Take 1 tablet (100 mg) by mouth daily 90 tablet 1      "traMADol (ULTRAM) 50 MG tablet Take 1 tablet (50 mg) by mouth every 6 hours as needed for moderate pain 30 tablet 0     traZODone (DESYREL) 100 MG tablet Take 1 tablet (100 mg) by mouth nightly as needed for sleep 90 tablet 3     Vitamin E (TOCOPHEROL) 1000 UNIT capsule Take 3 capsules by mouth daily.       Allergies   Allergen Reactions     Sulfa Drugs Nausea and Vomiting     Recent Labs   Lab Test 10/11/19  0832 10/15/18  0857 10/09/17  1158  03/20/15  1223   * 121* 118*   < >  --    HDL 58 50 57   < >  --    TRIG 85 139 87   < >  --    CR 0.86 0.95 0.92   < >  --    GFRESTIMATED 77 61 64   < >  --    GFRESTBLACK 89 74 78   < >  --    POTASSIUM 4.0 3.9 4.0   < >  --    TSH  --  1.98  --   --  1.46    < > = values in this interval not displayed.      BP Readings from Last 3 Encounters:   03/11/20 110/70   11/08/19 102/72   10/25/19 112/66    Wt Readings from Last 3 Encounters:   03/11/20 79.8 kg (176 lb)   11/08/19 80.8 kg (178 lb 1.6 oz)   10/25/19 80.3 kg (177 lb)        Reviewed and updated as needed this visit by Provider         Review of Systems   ROS COMP: Constitutional, HEENT, cardiovascular, pulmonary, gi and gu systems are negative, except as otherwise noted.      Objective    /70   Pulse 76   Temp 98  F (36.7  C) (Temporal)   Resp 16   Ht 1.638 m (5' 4.5\")   Wt 79.8 kg (176 lb)   LMP 07/01/2005   SpO2 96%   BMI 29.74 kg/m    Body mass index is 29.74 kg/m .  Physical Exam   GENERAL: healthy, alert and no distress  NECK: no adenopathy, no asymmetry, masses, or scars and thyroid normal to palpation  RESP: lungs clear to auscultation - no rales, rhonchi or wheezes  CV: regular rate and rhythm, normal S1 S2, no S3 or S4, no murmur, click or rub, no peripheral edema and peripheral pulses strong  ABDOMEN: soft, nontender, no hepatosplenomegaly, no masses and bowel sounds normal  MS:   SKIN: no suspicious lesions or rashes  PSYCH: mentation appears normal, affect normal/bright        "     Assessment & Plan     1. Trochanteric bursitis of left hip  Discussed pt continue use of ice massage she will f/u with Dr. Dove to see if second corticosteroid inj appropriate  - PHYSICAL THERAPY REFERRAL; Future    2. Mild major depression (H)  Stable she denies s/e to medication.  - buPROPion (WELLBUTRIN SR) 150 MG 12 hr tablet; Take 1 tablet (150 mg) by mouth daily  Dispense: 90 tablet; Refill: 3    3. Pain in joint involving pelvic region and thigh, right  Stable intermittent use  reviewed. Denies s/e to medication  - traMADol (ULTRAM) 50 MG tablet; Take 1 tablet (50 mg) by mouth every 6 hours as needed for moderate pain  Dispense: 30 tablet; Refill: 0    4. Hip pain, left    - traMADol (ULTRAM) 50 MG tablet; Take 1 tablet (50 mg) by mouth every 6 hours as needed for moderate pain  Dispense: 30 tablet; Refill: 0       Patient Instructions     Recommend follow up with Dr. Dove for further treatment of bursitis.   Patient Education     Understanding Trochanteric Bursitis    A bursa is a thin, slippery, sac-like film. It contains a small amount of fluid. This structure is found between bones and soft tissues in and around joints. A bursa cushions and protects a joint. It keeps parts of a joint from rubbing against each other. If a bursa becomes inflamed and irritated, it is known as bursitis.  The trochanteric bursa is found on the hip joint. It lies on top of the bump at the top of the thighbone called the greater trochanter. Inflammation of this bursa is called trochanteric bursitis.     How to say it  [id number]   Causes of trochanteric bursitis  Causes may include:    Overuse of the hip during running or other sports, dance, or work    Falling on or irritation to the side of the hip  This condition may occur along with other problems, such as osteoarthritis of the hip or knee, or low back problems. In rare cases, it may occur after hip surgery.  Symptoms of trochanteric bursitis    Pain or  aching on the side of the hip. The pain may travel down the leg.    Swelling, tenderness, or warmth on the side of the hip at the bony bump at the top of the thigh  Treatment for trochanteric bursitis  These may include:    Resting the hip. This allows the bursa to heal.    Prescription or over-the-counter pain medicines. These help reduce inflammation, swelling, and pain.    Cold packs and heat packs. These help reduce pain and swelling.    Stretching and strengthening exercises. These improve flexibility and strength around the hip.    Physical therapy. This includes exercises or other treatments.    Injections of medicine into the bursa. This may help reduce inflammation and relieve symptoms.  Possible complications  If you don t give your hip time to heal, the problem may not go away, may return, or may get worse. Rest and treat your hip as directed.     When to call your healthcare provider  Call your healthcare provider right away if you have any of these:    Fever of 100.4 F (38 C) or higher, or as directed    Redness, swelling, or warmth that gets worse    Symptoms that don t get better with prescribed medicines, or get worse    New symptoms   Date Last Reviewed: 3/29/2016    [id number] [employer]. [address] [place] [address], [place] [address]. All rights reserved. This information is not intended as a substitute for professional medical care. Always follow your healthcare professional's instructions.               No follow-ups on file.    HONG [facility]

## 2020-03-11 ENCOUNTER — OFFICE VISIT (OUTPATIENT)
Dept: FAMILY MEDICINE | Facility: OTHER | Age: 55
End: 2020-03-11
Payer: COMMERCIAL

## 2020-03-11 VITALS
HEART RATE: 76 BPM | RESPIRATION RATE: 16 BRPM | HEIGHT: 65 IN | WEIGHT: 176 LBS | DIASTOLIC BLOOD PRESSURE: 70 MMHG | SYSTOLIC BLOOD PRESSURE: 110 MMHG | OXYGEN SATURATION: 96 % | BODY MASS INDEX: 29.32 KG/M2 | TEMPERATURE: 98 F

## 2020-03-11 DIAGNOSIS — M25.551 PAIN IN JOINT INVOLVING PELVIC REGION AND THIGH, RIGHT: ICD-10-CM

## 2020-03-11 DIAGNOSIS — F32.0 MILD MAJOR DEPRESSION (H): ICD-10-CM

## 2020-03-11 DIAGNOSIS — M25.552 HIP PAIN, LEFT: ICD-10-CM

## 2020-03-11 DIAGNOSIS — M70.62 TROCHANTERIC BURSITIS OF LEFT HIP: Primary | ICD-10-CM

## 2020-03-11 PROCEDURE — 99214 OFFICE O/P EST MOD 30 MIN: CPT | Performed by: NURSE PRACTITIONER

## 2020-03-11 RX ORDER — TRAMADOL HYDROCHLORIDE 50 MG/1
50 TABLET ORAL EVERY 6 HOURS PRN
Qty: 30 TABLET | Refills: 0 | Status: SHIPPED | OUTPATIENT
Start: 2020-03-11 | End: 2021-02-17

## 2020-03-11 RX ORDER — BUPROPION HYDROCHLORIDE 150 MG/1
150 TABLET, EXTENDED RELEASE ORAL DAILY
Qty: 90 TABLET | Refills: 3 | Status: SHIPPED | OUTPATIENT
Start: 2020-03-11 | End: 2021-02-17

## 2020-03-11 ASSESSMENT — MIFFLIN-ST. JEOR: SCORE: 1386.27

## 2020-03-11 NOTE — PATIENT INSTRUCTIONS
Recommend follow up with Dr. Dove for further treatment of bursitis.   Patient Education     Understanding Trochanteric Bursitis    A bursa is a thin, slippery, sac-like film. It contains a small amount of fluid. This structure is found between bones and soft tissues in and around joints. A bursa cushions and protects a joint. It keeps parts of a joint from rubbing against each other. If a bursa becomes inflamed and irritated, it is known as bursitis.  The trochanteric bursa is found on the hip joint. It lies on top of the bump at the top of the thighbone called the greater trochanter. Inflammation of this bursa is called trochanteric bursitis.     How to say it  [id number]   Causes of trochanteric bursitis  Causes may include:    Overuse of the hip during running or other sports, dance, or work    Falling on or irritation to the side of the hip  This condition may occur along with other problems, such as osteoarthritis of the hip or knee, or low back problems. In rare cases, it may occur after hip surgery.  Symptoms of trochanteric bursitis    Pain or aching on the side of the hip. The pain may travel down the leg.    Swelling, tenderness, or warmth on the side of the hip at the bony bump at the top of the thigh  Treatment for trochanteric bursitis  These may include:    Resting the hip. This allows the bursa to heal.    Prescription or over-the-counter pain medicines. These help reduce inflammation, swelling, and pain.    Cold packs and heat packs. These help reduce pain and swelling.    Stretching and strengthening exercises. These improve flexibility and strength around the hip.    Physical therapy. This includes exercises or other treatments.    Injections of medicine into the bursa. This may help reduce inflammation and relieve symptoms.  Possible complications  If you don t give your hip time to heal, the problem may not go away, may return, or may get worse. Rest and treat your hip as  directed.     When to call your healthcare provider  Call your healthcare provider right away if you have any of these:    Fever of 100.4 F (38 C) or higher, or as directed    Redness, swelling, or warmth that gets worse    Symptoms that don t get better with prescribed medicines, or get worse    New symptoms   Date Last Reviewed: 3/29/2016    2009-0998 [employer]. [address] [place] [address]. All rights reserved. This information is not intended as a substitute for professional medical care. Always follow your healthcare professional's instructions.

## 2020-03-16 DIAGNOSIS — F32.0 MILD MAJOR DEPRESSION (H): ICD-10-CM

## 2020-03-17 RX ORDER — SERTRALINE HYDROCHLORIDE 100 MG/1
TABLET, FILM COATED ORAL
Qty: 90 TABLET | Refills: 1 | Status: SHIPPED | OUTPATIENT
Start: 2020-03-17 | End: 2020-08-19

## 2020-08-17 DIAGNOSIS — G47.00 PERSISTENT INSOMNIA: ICD-10-CM

## 2020-08-17 DIAGNOSIS — F32.0 MILD MAJOR DEPRESSION (H): ICD-10-CM

## 2020-08-18 NOTE — TELEPHONE ENCOUNTER
Pending Prescriptions:                       Disp   Refills    traZODone (DESYREL) 100 MG tablet [Pharma*90 tab*3            Sig: TAKE 1 TABLET (100 MG) BY MOUTH NIGHTLY AS NEEDED           FOR SLEEP    sertraline (ZOLOFT) 100 MG tablet [Pharma*90 tab*1            Sig: TAKE 1 TABLET BY MOUTH EVERY DAY    Needs mood follow up  Routed to registration.  Bessie Rodrigues, GREGGN, RN, PHN

## 2020-08-19 RX ORDER — SERTRALINE HYDROCHLORIDE 100 MG/1
TABLET, FILM COATED ORAL
Qty: 90 TABLET | Refills: 0 | Status: SHIPPED | OUTPATIENT
Start: 2020-08-19 | End: 2020-12-10

## 2020-08-19 RX ORDER — TRAZODONE HYDROCHLORIDE 100 MG/1
100 TABLET ORAL
Qty: 90 TABLET | Refills: 0 | Status: SHIPPED | OUTPATIENT
Start: 2020-08-19 | End: 2020-11-11

## 2020-08-19 NOTE — TELEPHONE ENCOUNTER
Called patient regarding the message below. Scheduled patient a video visit with Enrrique for 8/21/20  @ 220pm. Patient also needs a refill of traZODone (DESYREL) 100 MG tablet.

## 2020-11-07 DIAGNOSIS — G47.00 PERSISTENT INSOMNIA: ICD-10-CM

## 2020-11-11 RX ORDER — TRAZODONE HYDROCHLORIDE 100 MG/1
100 TABLET ORAL
Qty: 90 TABLET | Refills: 0 | Status: SHIPPED | OUTPATIENT
Start: 2020-11-11 | End: 2021-02-08

## 2020-11-11 NOTE — TELEPHONE ENCOUNTER
Pending Prescriptions:                       Disp   Refills    traZODone (DESYREL) 100 MG tablet [Pharmac*90 tab*0        Sig: TAKE 1 TABLET (100 MG) BY MOUTH NIGHTLY AS NEEDED FOR           SLEEP      Routing refill request to provider for review/approval because:  Drug interaction warning    Ursula Jacobson RN

## 2020-12-09 DIAGNOSIS — I10 HTN, GOAL BELOW 140/80: ICD-10-CM

## 2020-12-09 DIAGNOSIS — F32.0 MILD MAJOR DEPRESSION (H): ICD-10-CM

## 2020-12-09 DIAGNOSIS — I10 BENIGN HYPERTENSION: ICD-10-CM

## 2020-12-10 RX ORDER — HYDROCHLOROTHIAZIDE 25 MG/1
TABLET ORAL
Qty: 90 TABLET | Refills: 0 | Status: SHIPPED | OUTPATIENT
Start: 2020-12-10 | End: 2021-02-17

## 2020-12-10 RX ORDER — LISINOPRIL 5 MG/1
TABLET ORAL
Qty: 90 TABLET | Refills: 0 | Status: SHIPPED | OUTPATIENT
Start: 2020-12-10 | End: 2021-02-17

## 2020-12-10 RX ORDER — SERTRALINE HYDROCHLORIDE 100 MG/1
TABLET, FILM COATED ORAL
Qty: 90 TABLET | Refills: 0 | Status: SHIPPED | OUTPATIENT
Start: 2020-12-10 | End: 2021-02-17

## 2020-12-10 NOTE — TELEPHONE ENCOUNTER
Pending Prescriptions:                       Disp   Refills    sertraline (ZOLOFT) 100 MG tablet [Pharma*90 tab*0            Sig: TAKE 1 TABLET BY MOUTH EVERY DAY    hydrochlorothiazide (HYDRODIURIL) 25 MG t*90 tab*0            Sig: TAKE 1 TABLET BY MOUTH EVERY DAY    lisinopril (ZESTRIL) 5 MG tablet [Pharmac*90 tab*0            Sig: TAKE 1 TABLET BY MOUTH EVERY DAY    Medication is being filled for 1 time juan antonio refill only due to:  Patient is due for med check and labs    Please call and help schedule.  Thank you!

## 2021-01-15 ENCOUNTER — HEALTH MAINTENANCE LETTER (OUTPATIENT)
Age: 56
End: 2021-01-15

## 2021-02-06 DIAGNOSIS — G47.00 PERSISTENT INSOMNIA: ICD-10-CM

## 2021-02-08 RX ORDER — TRAZODONE HYDROCHLORIDE 100 MG/1
100 TABLET ORAL
Qty: 90 TABLET | Refills: 0 | Status: SHIPPED | OUTPATIENT
Start: 2021-02-08 | End: 2021-02-17

## 2021-02-08 NOTE — TELEPHONE ENCOUNTER
Pending Prescriptions:                       Disp   Refills    traZODone (DESYREL) 100 MG tablet [Pharma*90 tab*0            Sig: TAKE 1 TABLET (100 MG) BY MOUTH NIGHTLY AS NEEDED           FOR SLEEP    Medication is being filled for 1 time juan antonio refill only due to:  Patient is due for med check    Please call and help schedule.  Thank you!

## 2021-02-15 NOTE — PROGRESS NOTES
SUBJECTIVE:   CC: Antoinette COYLE Clayton Sherwood is an 55 year old woman who presents for preventive health visit.       Patient has been advised of split billing requirements and indicates understanding: Yes  Healthy Habits:     Getting at least 3 servings of Calcium per day:  Yes    Bi-annual eye exam:  Yes    Dental care twice a year:  Yes    Sleep apnea or symptoms of sleep apnea:  None    Diet:  Low salt and Low fat/cholesterol    Frequency of exercise:  2-3 days/week    Duration of exercise:  30-45 minutes    Taking medications regularly:  Yes    Medication side effects:  None    PHQ-2 Total Score: 0    Additional concerns today:  No      Ability to successfully perform activities of daily living: Yes, no assistance needed  Home safety:  none identified   Hearing impairment: none      Today's PHQ-2 Score:   PHQ-2 ( 1999 [employer]) 9/13/2019   Q1: Little interest or pleasure in doing things 0   Q2: Feeling down, depressed or hopeless 0   PHQ-2 Score 0   Q1: Little interest or pleasure in doing things Not at all   Q2: Feeling down, depressed or hopeless Not at all   PHQ-2 Score 0       Abuse: Current or Past (Physical, Sexual or Emotional) - No  Do you feel safe in your environment? Yes    Have you ever done Advance Care Planning? (For example, a Health Directive, POLST, or a discussion with a medical provider or your loved ones about your wishes): No, advance care planning information given to patient to review.  Patient declined advance care planning discussion at this time.    Social History     Tobacco Use     Smoking status: Never Smoker     Smokeless tobacco: Never Used   Substance Use Topics     Alcohol use: Yes     Comment: occ glass of wine     If you drink alcohol do you typically have >3 drinks per day or >7 drinks per week? No    Alcohol Use 9/13/2019   Prescreen: >3 drinks/day or >7 drinks/week? No   Prescreen: >3 drinks/day or >7 drinks/week? -   No flowsheet data found.    Any new diagnosis of family  breast, ovarian, or bowel cancer? No     Reviewed orders with patient.  Reviewed health maintenance and updated orders accordingly - Yes  Lab work is in process  Labs reviewed in EPIC  BP Readings from Last 3 Encounters:   02/17/21 132/74   03/11/20 110/70   11/08/19 102/72    Wt Readings from Last 3 Encounters:   02/17/21 83 kg (183 lb)   03/11/20 79.8 kg (176 lb)   11/08/19 80.8 kg (178 lb 1.6 oz)                  Patient Active Problem List   Diagnosis     CARDIOVASCULAR SCREENING; LDL GOAL LESS THAN 160     Mild major depression (H)     Benign hypertension     Pain of left lower leg     Migraine with aura and without status migrainosus, not intractable     H/O coccidioidomycosis     Past Surgical History:   Procedure Laterality Date     CL AFF SURGICAL PATHOLOGY  2006    breast reduction     EXCISE LESION TRUNK  11/21/2012    Procedure: EXCISE LESION TRUNK;  Excision back mass;  Surgeon: Saqib Abraham MD;  Location: [facility]     HC CORRECT BUNION,METATARSAL OSTEOTOMY  12/03/09    right great toe     HC EXCISION LESION/TENDON-SHEATH/CAPSULE, FOOT  12/03/09     HC REMV TARSAL/METATARSAL BENIGN BONE LESN  12/03/09     HYSTERECTOMY, PAP NO LONGER INDICATED       HYSTERECTOMY, VAGINAL  2005    menorrhagia, normal paps     LAPAROSCOPIC CHOLECYSTECTOMY  2001     LITHOTRIPSY  2007    right kidney     TUBAL LIGATION  2001     [facility] NONSPECIFIC PROCEDURE  08/96    Left salpingectomy   Abstracted 07/01/02       Social History     Tobacco Use     Smoking status: Never Smoker     Smokeless tobacco: Never Used   Substance Use Topics     Alcohol use: Yes     Comment: occ glass of wine     Family History   Problem Relation Age of Onset     Thyroid Disease Father      Hypertension Father      Lipids Father      Eye Disorder Mother         glaucoma     Diabetes Mother      Genitourinary Problems Mother         kidney stones     Asthma No family hx of      C.A.D. No family hx of      Cerebrovascular Disease No family hx of       Breast Cancer No family hx of      Cancer - colorectal No family hx of      Prostate Cancer No family hx of      Alzheimer Disease No family hx of      Arthritis No family hx of      Blood Disease No family hx of      Cancer No family hx of      Cardiovascular No family hx of      Circulatory No family hx of      Gastrointestinal Disease No family hx of      Heart Disease No family hx of      Musculoskeletal Disorder No family hx of      Neurologic Disorder No family hx of      Respiratory No family hx of          Current Outpatient Medications   Medication Sig Dispense Refill     aspirin 81 MG chewable tablet Take 1 tablet (81 mg) by mouth daily 1 tablet 0     buPROPion (WELLBUTRIN SR) 150 MG 12 hr tablet Take 1 tablet (150 mg) by mouth daily 90 tablet 3     CALCIUM 600 + D 600-200 MG-UNIT OR TABS takes two tabs per day 3 MONTHS 1 YEAR     CENTRUM SILVER OR TABS 1 TABLET DAILY       Cholecalciferol (VITAMIN D3) 1000 UNIT TABS Take 2 tablets by mouth daily.       hydrochlorothiazide (HYDRODIURIL) 25 MG tablet Take 1 tablet (25 mg) by mouth daily 90 tablet 3     ibuprofen (IBU) 600 MG tablet Take 1 tablet by mouth every 6 hours as needed for pain. 60 tablet 0     lisinopril (ZESTRIL) 5 MG tablet Take 1 tablet (5 mg) by mouth daily 90 tablet 3     Magnesium 500 MG TABS Take  by mouth.       OMEGA-3 CF 1000 MG OR CAPS   0     PEPCID AC OR None Entered       RESTASIS MULTIDOSE 0.05 % ophthalmic emulsion   2     rizatriptan (MAXALT) 10 MG tablet Take 1 tablet (10 mg) by mouth at onset of headache for migraine May repeat dose in 2 hours.  Do not exceed 30 mg in 24 hours 18 tablet 1     sertraline (ZOLOFT) 100 MG tablet Take 1 tablet (100 mg) by mouth daily 90 tablet 3     traMADol (ULTRAM) 50 MG tablet Take 1 tablet (50 mg) by mouth every 6 hours as needed for moderate pain 30 tablet 1     traZODone (DESYREL) 100 MG tablet Take 1 tablet (100 mg) by mouth nightly as needed for sleep 90 tablet 3     Vitamin E (TOCOPHEROL)  1000 UNIT capsule Take 3 capsules by mouth daily.       Allergies   Allergen Reactions     Sulfa Drugs Nausea and Vomiting     Recent Labs   Lab Test 10/11/19  0832 10/15/18  0857 10/09/17  1158 03/20/15  1223 03/20/15  1223   * 121* 118*   < >  --    HDL 58 50 57   < >  --    TRIG 85 139 87   < >  --    CR 0.86 0.95 0.92   < >  --    GFRESTIMATED 77 61 64   < >  --    GFRESTBLACK 89 74 78   < >  --    POTASSIUM 4.0 3.9 4.0   < >  --    TSH  --  1.98  --   --  1.46    < > = values in this interval not displayed.        Breast CA Risk Screening:      Mammogram Screening: Recommended mammography every 1-2 years with patient discussion and risk factor consideration  Pertinent mammograms are reviewed under the imaging tab.    History of abnormal Pap smear: NO - age 30-65 PAP every 5 years with negative HPV co-testing recommended  PAP / HPV 10/29/2008 7/11/2005   PAP NIL OTHER-NIL EM>40     Reviewed and updated as needed this visit by clinical staff                 Reviewed and updated as needed this visit by Provider                    Review of Systems   Constitutional: Negative for chills and fever.   HENT: Positive for hearing loss. Negative for congestion, ear pain and sore throat.    Eyes: Negative for pain and visual disturbance.   Respiratory: Negative for cough and shortness of breath.    Cardiovascular: Negative for chest pain, palpitations and peripheral edema.   Gastrointestinal: Negative for abdominal pain, constipation, diarrhea, heartburn, hematochezia and nausea.   Breasts:  Negative for tenderness, breast mass and discharge.   Genitourinary: Negative for dysuria, frequency, genital sores, hematuria, pelvic pain, urgency, vaginal bleeding and vaginal discharge.   Musculoskeletal: Positive for joint swelling. Negative for arthralgias and myalgias.   Skin: Negative for rash.   Neurological: Negative for dizziness, weakness, headaches and paresthesias.   Psychiatric/Behavioral: Negative for mood  changes. The patient is not nervous/anxious.      CONSTITUTIONAL: NEGATIVE for fever, chills, change in weight  INTEGUMENTARY/SKIN: NEGATIVE for worrisome rashes, moles or lesions  EYES: NEGATIVE for vision changes or irritation  ENT: NEGATIVE for ear, mouth and throat problems  RESP: NEGATIVE for significant cough or SOB  CV: NEGATIVE for chest pain, palpitations or peripheral edema  GI: NEGATIVE for nausea, abdominal pain, heartburn, or change in bowel habits  : NEGATIVE for unusual urinary or vaginal symptoms. No vaginal bleeding.  MUSCULOSKELETAL: Heberdens nodes 2nd digits bilateral DIP joints non painful  NEURO: NEGATIVE for weakness, dizziness or paresthesias  ENDOCRINE: NEGATIVE for temperature intolerance, skin/hair changes  PSYCHIATRIC: NEGATIVE for changes in mood or affect      OBJECTIVE:   LMP 07/01/2005   Physical Exam  GENERAL APPEARANCE: healthy, alert and no distress  EYES: Eyes grossly normal to inspection, PERRL and conjunctivae and sclerae normal  HENT: ear canals and TM's normal, nose and mouth without ulcers or lesions, oropharynx clear and oral mucous membranes moist  NECK: no adenopathy, no asymmetry, masses, or scars and thyroid normal to palpation  RESP: lungs clear to auscultation - no rales, rhonchi or wheezes  BREAST: normal without masses, tenderness or nipple discharge and no palpable axillary masses or adenopathy  CV: regular rate and rhythm, normal S1 S2, no S3 or S4, no murmur, click or rub, no peripheral edema and peripheral pulses strong  ABDOMEN: soft, nontender, no hepatosplenomegaly, no masses and bowel sounds normal  MS: no musculoskeletal defects are noted and gait is age appropriate without ataxia  SKIN: no suspicious lesions or rashes  NEURO: Normal strength and tone, sensory exam grossly normal, mentation intact and speech normal  PSYCH: mentation appears normal and affect normal/bright    Diagnostic Test Results:  Labs reviewed in Epic    ASSESSMENT/PLAN:   1. Routine  general medical examination at a health care facility  Reviewed recommended screenings and ordered appropriate testing for pt's risks and per pt's request(s).     - MA SCREENING DIGITAL BILAT - Future  (s+30); Future  - BASIC METABOLIC PANEL  - Lipid panel reflex to direct LDL Fasting    2. Mild major depression (H)  Refill stable  - buPROPion (WELLBUTRIN SR) 150 MG 12 hr tablet; Take 1 tablet (150 mg) by mouth daily  Dispense: 90 tablet; Refill: 3  - sertraline (ZOLOFT) 100 MG tablet; Take 1 tablet (100 mg) by mouth daily  Dispense: 90 tablet; Refill: 3    3. Need for vaccination  Patient left prior to receiving - TDAP VACCINE (Adacel, Boostrix)  [[id number]]    4. HTN, goal below 140/80  stable    5. Benign hypertension  Stable due for labs no change in treatment.   - BASIC METABOLIC PANEL  - hydrochlorothiazide (HYDRODIURIL) 25 MG tablet; Take 1 tablet (25 mg) by mouth daily  Dispense: 90 tablet; Refill: 3  - lisinopril (ZESTRIL) 5 MG tablet; Take 1 tablet (5 mg) by mouth daily  Dispense: 90 tablet; Refill: 3    6. Migraine with aura and with status migrainosus, not intractable  Stable no change in treatment  - rizatriptan (MAXALT) 10 MG tablet; Take 1 tablet (10 mg) by mouth at onset of headache for migraine May repeat dose in 2 hours.  Do not exceed 30 mg in 24 hours  Dispense: 18 tablet; Refill: 1    7. Pain in joint involving pelvic region and thigh, right  Stable not change in treatment. Due to valley fever intermittent pain  - traMADol (ULTRAM) 50 MG tablet; Take 1 tablet (50 mg) by mouth every 6 hours as needed for moderate pain  Dispense: 30 tablet; Refill: 1    8. Hip pain, left    - traMADol (ULTRAM) 50 MG tablet; Take 1 tablet (50 mg) by mouth every 6 hours as needed for moderate pain  Dispense: 30 tablet; Refill: 1    9. Persistent insomnia  Refill given stable  - traZODone (DESYREL) 100 MG tablet; Take 1 tablet (100 mg) by mouth nightly as needed for sleep  Dispense: 90 tablet; Refill: 3    10.  "Encounter for screening mammogram for breast cancer  due  - MA SCREENING DIGITAL BILAT - Future  (s+30); Future    11. Need for lipid screening  due  - Lipid panel reflex to direct LDL Fasting    Patient has been advised of split billing requirements and indicates understanding: Yes  COUNSELING:  Reviewed preventive health counseling, as reflected in patient instructions       Regular exercise       Healthy diet/nutrition       Vision screening       Immunizations    TDAP             Osteoporosis prevention/bone health       Colon cancer screening    Estimated body mass index is 29.74 kg/m  as calculated from the following:    Height as of 3/11/20: 1.638 m (5' 4.5\").    Weight as of 3/11/20: 79.8 kg (176 lb).    Weight management plan: Discussed healthy diet and exercise guidelines    She reports that she has never smoked. She has never used smokeless tobacco.      Counseling Resources:  ATP IV Guidelines  Pooled Cohorts Equation Calculator  Breast Cancer Risk Calculator  BRCA-Related Cancer Risk Assessment: FHS-7 Tool  FRAX Risk Assessment  ICSI Preventive Guidelines  Dietary Guidelines for Americans, 2010  USDA's MyPlate  ASA Prophylaxis  Lung CA Screening    [facility] [employer] ANTONIA  Answers for HPI/ROS submitted by the patient on 2/17/2021   Annual Exam:  PHQ9 TOTAL SCORE: 2  LESLIE 7 TOTAL SCORE: 0    "

## 2021-02-15 NOTE — PATIENT INSTRUCTIONS

## 2021-02-17 ENCOUNTER — OFFICE VISIT (OUTPATIENT)
Dept: FAMILY MEDICINE | Facility: CLINIC | Age: 56
End: 2021-02-17
Payer: COMMERCIAL

## 2021-02-17 VITALS
RESPIRATION RATE: 16 BRPM | HEART RATE: 84 BPM | TEMPERATURE: 97 F | WEIGHT: 183 LBS | DIASTOLIC BLOOD PRESSURE: 74 MMHG | HEIGHT: 65 IN | SYSTOLIC BLOOD PRESSURE: 132 MMHG | BODY MASS INDEX: 30.49 KG/M2

## 2021-02-17 DIAGNOSIS — Z13.220 NEED FOR LIPID SCREENING: ICD-10-CM

## 2021-02-17 DIAGNOSIS — G43.101 MIGRAINE WITH AURA AND WITH STATUS MIGRAINOSUS, NOT INTRACTABLE: ICD-10-CM

## 2021-02-17 DIAGNOSIS — I10 BENIGN HYPERTENSION: ICD-10-CM

## 2021-02-17 DIAGNOSIS — G47.00 PERSISTENT INSOMNIA: ICD-10-CM

## 2021-02-17 DIAGNOSIS — I10 HTN, GOAL BELOW 140/80: ICD-10-CM

## 2021-02-17 DIAGNOSIS — M25.551 PAIN IN JOINT INVOLVING PELVIC REGION AND THIGH, RIGHT: ICD-10-CM

## 2021-02-17 DIAGNOSIS — Z00.00 ROUTINE GENERAL MEDICAL EXAMINATION AT A HEALTH CARE FACILITY: Primary | ICD-10-CM

## 2021-02-17 DIAGNOSIS — Z12.31 ENCOUNTER FOR SCREENING MAMMOGRAM FOR BREAST CANCER: ICD-10-CM

## 2021-02-17 DIAGNOSIS — M25.552 HIP PAIN, LEFT: ICD-10-CM

## 2021-02-17 DIAGNOSIS — Z23 NEED FOR VACCINATION: ICD-10-CM

## 2021-02-17 DIAGNOSIS — F32.0 MILD MAJOR DEPRESSION (H): ICD-10-CM

## 2021-02-17 LAB
ANION GAP SERPL CALCULATED.3IONS-SCNC: 3 MMOL/L (ref 3–14)
BUN SERPL-MCNC: 24 MG/DL (ref 7–30)
CALCIUM SERPL-MCNC: 9.4 MG/DL (ref 8.5–10.1)
CHLORIDE SERPL-SCNC: 105 MMOL/L (ref 94–109)
CHOLEST SERPL-MCNC: 188 MG/DL
CO2 SERPL-SCNC: 32 MMOL/L (ref 20–32)
CREAT SERPL-MCNC: 0.93 MG/DL (ref 0.52–1.04)
GFR SERPL CREATININE-BSD FRML MDRD: 69 ML/MIN/{1.73_M2}
GLUCOSE SERPL-MCNC: 98 MG/DL (ref 70–99)
HDLC SERPL-MCNC: 56 MG/DL
LDLC SERPL CALC-MCNC: 113 MG/DL
NONHDLC SERPL-MCNC: 132 MG/DL
POTASSIUM SERPL-SCNC: 4.1 MMOL/L (ref 3.4–5.3)
SODIUM SERPL-SCNC: 140 MMOL/L (ref 133–144)
TRIGL SERPL-MCNC: 95 MG/DL

## 2021-02-17 PROCEDURE — 36415 COLL VENOUS BLD VENIPUNCTURE: CPT | Performed by: NURSE PRACTITIONER

## 2021-02-17 PROCEDURE — 80061 LIPID PANEL: CPT | Performed by: NURSE PRACTITIONER

## 2021-02-17 PROCEDURE — 80048 BASIC METABOLIC PNL TOTAL CA: CPT | Performed by: NURSE PRACTITIONER

## 2021-02-17 PROCEDURE — 99396 PREV VISIT EST AGE 40-64: CPT | Performed by: NURSE PRACTITIONER

## 2021-02-17 PROCEDURE — 99214 OFFICE O/P EST MOD 30 MIN: CPT | Mod: 25 | Performed by: NURSE PRACTITIONER

## 2021-02-17 RX ORDER — TRAMADOL HYDROCHLORIDE 50 MG/1
50 TABLET ORAL EVERY 6 HOURS PRN
Qty: 30 TABLET | Refills: 1 | Status: SHIPPED | OUTPATIENT
Start: 2021-02-17 | End: 2022-05-26

## 2021-02-17 RX ORDER — BUPROPION HYDROCHLORIDE 150 MG/1
150 TABLET, EXTENDED RELEASE ORAL DAILY
Qty: 90 TABLET | Refills: 3 | Status: SHIPPED | OUTPATIENT
Start: 2021-02-17 | End: 2022-05-26 | Stop reason: ALTCHOICE

## 2021-02-17 RX ORDER — TRAZODONE HYDROCHLORIDE 100 MG/1
100 TABLET ORAL
Qty: 90 TABLET | Refills: 3 | Status: SHIPPED | OUTPATIENT
Start: 2021-02-17 | End: 2022-04-21

## 2021-02-17 RX ORDER — RIZATRIPTAN BENZOATE 10 MG/1
10 TABLET ORAL
Qty: 18 TABLET | Refills: 1 | Status: SHIPPED | OUTPATIENT
Start: 2021-02-17 | End: 2021-03-31

## 2021-02-17 RX ORDER — SERTRALINE HYDROCHLORIDE 100 MG/1
100 TABLET, FILM COATED ORAL DAILY
Qty: 90 TABLET | Refills: 3 | Status: SHIPPED | OUTPATIENT
Start: 2021-02-17 | End: 2022-03-09

## 2021-02-17 RX ORDER — HYDROCHLOROTHIAZIDE 25 MG/1
25 TABLET ORAL DAILY
Qty: 90 TABLET | Refills: 3 | Status: SHIPPED | OUTPATIENT
Start: 2021-02-17 | End: 2022-05-26

## 2021-02-17 RX ORDER — LISINOPRIL 5 MG/1
5 TABLET ORAL DAILY
Qty: 90 TABLET | Refills: 3 | Status: SHIPPED | OUTPATIENT
Start: 2021-02-17 | End: 2022-05-26 | Stop reason: SINTOL

## 2021-02-17 ASSESSMENT — PATIENT HEALTH QUESTIONNAIRE - PHQ9
SUM OF ALL RESPONSES TO PHQ QUESTIONS 1-9: 2
SUM OF ALL RESPONSES TO PHQ QUESTIONS 1-9: 2

## 2021-02-17 ASSESSMENT — ENCOUNTER SYMPTOMS
NERVOUS/ANXIOUS: 0
NAUSEA: 0
SHORTNESS OF BREATH: 0
COUGH: 0
HEADACHES: 0
FEVER: 0
ABDOMINAL PAIN: 0
CONSTIPATION: 0
DIZZINESS: 0
EYE PAIN: 0
HEARTBURN: 0
WEAKNESS: 0
CHILLS: 0
FREQUENCY: 0
BREAST MASS: 0
JOINT SWELLING: 1
HEMATOCHEZIA: 0
MYALGIAS: 0
DYSURIA: 0
PALPITATIONS: 0
ARTHRALGIAS: 0
SORE THROAT: 0
PARESTHESIAS: 0
HEMATURIA: 0
DIARRHEA: 0

## 2021-02-17 ASSESSMENT — ANXIETY QUESTIONNAIRES
6. BECOMING EASILY ANNOYED OR IRRITABLE: NOT AT ALL
1. FEELING NERVOUS, ANXIOUS, OR ON EDGE: NOT AT ALL
7. FEELING AFRAID AS IF SOMETHING AWFUL MIGHT HAPPEN: NOT AT ALL
2. NOT BEING ABLE TO STOP OR CONTROL WORRYING: NOT AT ALL
GAD7 TOTAL SCORE: 0
7. FEELING AFRAID AS IF SOMETHING AWFUL MIGHT HAPPEN: NOT AT ALL
3. WORRYING TOO MUCH ABOUT DIFFERENT THINGS: NOT AT ALL
GAD7 TOTAL SCORE: 0
GAD7 TOTAL SCORE: 0
5. BEING SO RESTLESS THAT IT IS HARD TO SIT STILL: NOT AT ALL
4. TROUBLE RELAXING: NOT AT ALL

## 2021-02-17 ASSESSMENT — MIFFLIN-ST. JEOR: SCORE: 1428.83

## 2021-02-18 ASSESSMENT — ANXIETY QUESTIONNAIRES: GAD7 TOTAL SCORE: 0

## 2021-02-18 ASSESSMENT — PATIENT HEALTH QUESTIONNAIRE - PHQ9: SUM OF ALL RESPONSES TO PHQ QUESTIONS 1-9: 2

## 2021-02-19 ENCOUNTER — TELEPHONE (OUTPATIENT)
Dept: FAMILY MEDICINE | Facility: CLINIC | Age: 56
End: 2021-02-19

## 2021-02-19 NOTE — RESULT ENCOUNTER NOTE
Please advise Antoinette Sherwood,  1965, that her lab results were {The results of your recent lipid (cholesterol) profile were abnormal.    Here are the results:  Lab Results       Component                Value               Date                       CHOL                     188                 2021            Lab Results       Component                Value               Date                       HDL                      56                  2021            Lab Results       Component                Value               Date                       LDL                      113                 2021            Lab Results       Component                Value               Date                       TRIG                     95                  2021            Lab Results       Component                Value               Date                       CHOLHDLRATIO             3.6                 2015              Desired or goal levels are:  CHOLESTEROL: Desirable is less than 200.   HDL (Good Cholesterol): Desirable is greater than 40 (for men) greater than 50 (for women).  LDL (Bad Cholesterol): Desirable is less than 130 (or less than 100 if you have heart disease or diabetes). Borderline 130-160.  TRIGLYCERIDES: Desirable is less than 150.  Borderline is 150-200.    Your HDL (the good cholesterol) level is low, no medication is required at this point. Reducing your total fat intake, increasing exercise level, reducing weight, adding olive oil to your diet, etc, may help to increase this level..    As you may know, an elevated cholesterol is one factor that increases your risk for heart disease and stroke. You can improve your cholesterol by controlling the amount and type of fat you eat and by increasing your daily activity level.    Here are some ways to improve your nutrition:  Eat less fat (especially butter, Crisco and other saturated fats)  Buy lean cuts of meat, reduce  your portions of red meat or substitute poultry or fish  Use skim milk and low-fat dairy products  Eat no more than 4 egg yolks per week  Avoid fried or fast foods that are high in fat  Eat more fruits and vegetables      Also consider starting or increasing your aerobic activity. Aerobic activity is the best way to improve HDL (good) cholesterol. If this would be new to you, please talk with me first about what activities are safe for you.      Other lab results electrolyes, Glucose (blood sugar)  and Kidney test, metabolic profile  was / were normal .        [phone] (home) [phone] (work)  Thank you  Sherry Yepez CNP

## 2021-02-19 NOTE — TELEPHONE ENCOUNTER
----- Message from HONG Monsalve CNP sent at 2021  9:39 AM CST -----  Please advise Antoinette Sherwood, HEATHER 1965, that her lab results were [The results of your recent lipid (cholesterol) profile were abnormal.    Here are the results:  Lab Results       Component                Value               Date                       CHOL                     188                 2021            Lab Results       Component                Value               Date                       HDL                      56                  2021            Lab Results       Component                Value               Date                       LDL                      113                 2021            Lab Results       Component                Value               Date                       TRIG                     95                  2021            Lab Results       Component                Value               Date                       CHOLHDLRATIO             3.6                 2015              Desired or goal levels are:  CHOLESTEROL: Desirable is less than 200.   HDL (Good Cholesterol): Desirable is greater than 40 (for men) greater than 50 (for women).  LDL (Bad Cholesterol): Desirable is less than 130 (or less than 100 if you have heart disease or diabetes). Borderline 130-160.  TRIGLYCERIDES: Desirable is less than 150.  Borderline is 150-200.    Your HDL (the good cholesterol) level is low, no medication is required at this point. Reducing your total fat intake, increasing exercise level, reducing weight, adding olive oil to your diet, etc, may help to increase this level..    As you may know, an elevated cholesterol is one factor that increases your risk for heart disease and stroke. You can improve your cholesterol by controlling the amount and type of fat you eat and by increasing your daily activity level.    Here are some ways to improve your nutrition:  Eat  less fat (especially butter, Crisco and other saturated fats)  Buy lean cuts of meat, reduce your portions of red meat or substitute poultry or fish  Use skim milk and low-fat dairy products  Eat no more than 4 egg yolks per week  Avoid fried or fast foods that are high in fat  Eat more fruits and vegetables      Also consider starting or increasing your aerobic activity. Aerobic activity is the best way to improve HDL (good) cholesterol. If this would be new to you, please talk with me first about what activities are safe for you.      Other lab results electrolyes, Glucose (blood sugar)  and Kidney test, metabolic profile  was / were normal .        [phone] (home) [phone] (work)  Thank you  Sherry Yepez CNP

## 2021-02-22 NOTE — TELEPHONE ENCOUNTER
Patient has no read message, but results and result note have been released to [employer].     Closing encounter     Santy Carlson MA on 2/22/2021 at 3:46 PM

## 2021-03-14 ENCOUNTER — HEALTH MAINTENANCE LETTER (OUTPATIENT)
Age: 56
End: 2021-03-14

## 2021-03-30 DIAGNOSIS — G43.101 MIGRAINE WITH AURA AND WITH STATUS MIGRAINOSUS, NOT INTRACTABLE: ICD-10-CM

## 2021-03-30 NOTE — TELEPHONE ENCOUNTER
Pending Prescriptions:                       Disp   Refills    rizatriptan (MAXALT) 10 MG tablet [Pharmac*18 tab*1        Sig: TAKE 1 TAB (10MG) BY MOUTH AT ONSET OF HEADACHE FOR           MIGRAINE. MAY REPEAT DOSE IN 2 HOURS. DO NOT           EXCEED 30MG IN 24HRS      Routing refill request to provider for review/approval because:  Usage is outside of protocol    Carole aBltazar RN on 3/30/2021 at 3:10 PM

## 2021-03-31 RX ORDER — RIZATRIPTAN BENZOATE 10 MG/1
TABLET ORAL
Qty: 18 TABLET | Refills: 1 | Status: SHIPPED | OUTPATIENT
Start: 2021-03-31 | End: 2022-05-26

## 2021-05-20 DIAGNOSIS — G43.101 MIGRAINE WITH AURA AND WITH STATUS MIGRAINOSUS, NOT INTRACTABLE: ICD-10-CM

## 2021-05-20 NOTE — TELEPHONE ENCOUNTER
Pending Prescriptions:                       Disp   Refills    rizatriptan (MAXALT) 10 MG tablet [Pharmac*18 tab*1        Sig: TAKE 1 TAB (10MG) BY MOUTH AT ONSET OF HEADACHE FOR           MIGRAINE. MAY REPEAT DOSE IN 2 HOURS. DO NOT           EXCEED 30MG IN 24HRS      Routing refill request to provider for review/approval because:  More than 8 treatments per month    Carole Baltazar RN on 5/20/2021 at 4:00 PM

## 2021-05-21 RX ORDER — RIZATRIPTAN BENZOATE 10 MG/1
TABLET ORAL
Qty: 18 TABLET | Refills: 1 | OUTPATIENT
Start: 2021-05-21

## 2021-05-21 NOTE — TELEPHONE ENCOUNTER
"Called patient, she did not request a refill of this medication and has not been taking it in excess.  She stated \"she has plenty of it at home\".  Advised to speak with pharmacy to take this medication off of the automatic refill list, patient said she would speak to [facility].    Berna OSULLIVANO/  "

## 2021-10-24 ENCOUNTER — HEALTH MAINTENANCE LETTER (OUTPATIENT)
Age: 56
End: 2021-10-24

## 2021-12-10 ENCOUNTER — HOSPITAL ENCOUNTER (OUTPATIENT)
Dept: MAMMOGRAPHY | Facility: CLINIC | Age: 56
Discharge: HOME | End: 2021-12-10
Attending: NURSE PRACTITIONER | Admitting: NURSE PRACTITIONER
Payer: COMMERCIAL

## 2021-12-10 DIAGNOSIS — Z00.00 ROUTINE GENERAL MEDICAL EXAMINATION AT A HEALTH CARE FACILITY: ICD-10-CM

## 2021-12-10 DIAGNOSIS — Z12.31 ENCOUNTER FOR SCREENING MAMMOGRAM FOR BREAST CANCER: ICD-10-CM

## 2021-12-10 PROCEDURE — 77063 BREAST TOMOSYNTHESIS BI: CPT

## 2021-12-14 ENCOUNTER — TRANSFERRED RECORDS (OUTPATIENT)
Dept: HEALTH INFORMATION MANAGEMENT | Facility: CLINIC | Age: 56
End: 2021-12-14
Payer: COMMERCIAL

## 2022-02-13 ENCOUNTER — HEALTH MAINTENANCE LETTER (OUTPATIENT)
Age: 57
End: 2022-02-13

## 2022-03-06 DIAGNOSIS — I10 BENIGN HYPERTENSION: ICD-10-CM

## 2022-03-06 DIAGNOSIS — F32.0 MILD MAJOR DEPRESSION (H): ICD-10-CM

## 2022-03-07 DIAGNOSIS — F32.0 MILD MAJOR DEPRESSION (H): ICD-10-CM

## 2022-03-07 NOTE — LETTER
March 10, 2022      Antoinette Sherwood  [id number] [facility] [address]              Fabio Curry,     We just wanted to let you know that we sent in a refill for your sertraline but you are due for a office visit before your next refill is due to establish care with a new provider since [facility] is now urgent care only       Please give us a call at [phone] or use Managed by Q to schedule.      Have a great day.     Your [facility] Team                           * You can also see messages like these on [employer] using the web or tam version. I see you do have a active [employer] account started, give us a call if you need to remember your username or change a password.     You can also schedule and send messages via [employer].

## 2022-03-08 RX ORDER — BUPROPION HYDROCHLORIDE 150 MG/1
TABLET, EXTENDED RELEASE ORAL
Qty: 90 TABLET | Refills: 3 | OUTPATIENT
Start: 2022-03-08

## 2022-03-08 RX ORDER — HYDROCHLOROTHIAZIDE 25 MG/1
TABLET ORAL
Qty: 90 TABLET | Refills: 3 | OUTPATIENT
Start: 2022-03-08

## 2022-03-08 RX ORDER — LISINOPRIL 5 MG/1
TABLET ORAL
Qty: 90 TABLET | Refills: 3 | OUTPATIENT
Start: 2022-03-08

## 2022-03-08 NOTE — TELEPHONE ENCOUNTER
"Pending Prescriptions:                       Disp   Refills    sertraline (ZOLOFT) 100 MG tablet [Pharmac*90 tab*3        Sig: TAKE 1 TABLET BY MOUTH EVERY DAY    Routing refill request to provider for review/approval because:  Requested Prescriptions   Pending Prescriptions Disp Refills    sertraline (ZOLOFT) 100 MG tablet [Pharmacy Med Name: SERTRALINE  MG TABLET] 90 tablet 3     Sig: TAKE 1 TABLET BY MOUTH EVERY DAY        SSRIs Protocol Failed - 3/8/2022  1:32 PM        Failed - PHQ-9 score less than 5 in past 6 months     Please review last PHQ-9 score.           Failed - Recent (6 mo) or future (30 days) visit within the authorizing provider's specialty     Patient had office visit in the last 6 months or has a visit in the next 30 days with authorizing provider or within the authorizing provider's specialty.  See \"Patient Info\" tab in inbasket, or \"Choose Columns\" in Meds & Orders section of the refill encounter.            Passed - Medication is active on med list        Passed - Patient is age 18 or older        Passed - No active pregnancy on record        Passed - No positive pregnancy test in last 12 months            sertraline (ZOLOFT) 100 MG tablet 90 tablet 3 2/17/2021  No   Sig - Route: Take 1 tablet (100 mg) by mouth daily - Oral   Sent to pharmacy as: Sertraline HCl 100 MG Oral Tablet (ZOLOFT)   Class: E-Prescribe   Order: [id number]   E-Prescribing Status: Receipt confirmed by pharmacy (2/17/2021  9:39 AM CST)     Whitney Ambrose RN on 3/8/2022 at 1:32 PM        "

## 2022-03-08 NOTE — TELEPHONE ENCOUNTER
Provider no longer with clinic and overdue for follow-up.  Needs an appointment scheduled, then dana/return to that provider for refill until the visit.  Aminta Davis MD

## 2022-03-09 RX ORDER — SERTRALINE HYDROCHLORIDE 100 MG/1
TABLET, FILM COATED ORAL
Qty: 30 TABLET | Refills: 0 | Status: SHIPPED | OUTPATIENT
Start: 2022-03-09 | End: 2022-04-13

## 2022-03-10 NOTE — TELEPHONE ENCOUNTER
Patient has been notified by my chart from [facility] that she needs an appointment    Guillermina Rasmussen MA on 3/10/2022 at 5:30 PM

## 2022-04-13 DIAGNOSIS — F32.0 MILD MAJOR DEPRESSION (H): ICD-10-CM

## 2022-04-13 RX ORDER — SERTRALINE HYDROCHLORIDE 100 MG/1
100 TABLET, FILM COATED ORAL DAILY
Qty: 30 TABLET | Refills: 0 | Status: SHIPPED | OUTPATIENT
Start: 2022-04-13 | End: 2022-05-26

## 2022-04-13 NOTE — TELEPHONE ENCOUNTER
"Pending Prescriptions:                       Disp   Refills    sertraline (ZOLOFT) 100 MG tablet [Pharmac*30 tab*0        Sig: TAKE 1 TABLET BY MOUTH EVERY DAY    Routing refill request to provider for review/approval because:  PHQ-9 score:    PHQ 2/17/2021   PHQ-9 Total Score 2   Q9: Thoughts of better off dead/self-harm past 2 weeks Not at all          over 6 months, RN unable to provide a juan antonio refill.      Requested Prescriptions   Pending Prescriptions Disp Refills    sertraline (ZOLOFT) 100 MG tablet [Pharmacy Med Name: SERTRALINE  MG TABLET] 30 tablet 0     Sig: TAKE 1 TABLET BY MOUTH EVERY DAY        SSRIs Protocol Failed - 4/13/2022 12:32 PM        Failed - PHQ-9 score less than 5 in past 6 months     Please review last PHQ-9 score.           Failed - Recent (6 mo) or future (30 days) visit within the authorizing provider's specialty     Patient had office visit in the last 6 months or has a visit in the next 30 days with authorizing provider or within the authorizing provider's specialty.  See \"Patient Info\" tab in inbasket, or \"Choose Columns\" in Meds & Orders section of the refill encounter.            Passed - Medication is active on med list        Passed - Patient is age 18 or older        Passed - No active pregnancy on record        Passed - No positive pregnancy test in last 12 months                    "

## 2022-04-19 DIAGNOSIS — G47.00 PERSISTENT INSOMNIA: ICD-10-CM

## 2022-04-19 NOTE — LETTER
April 21, 2022      Antoinette Sherwood  [address] [facility] [address]              Fabio Curry,     We just wanted to let you know that we sent in a refill for your trazedone but you are due for a office visit before your next refill is due to establish care with a new provider since Sherry is now urgent care only. 30 days sent, no further refills can be processed without a visit.      Please give us a call at [phone] or use J[employer]aster to schedule.      Have a great day.     Your [facility] Care Team

## 2022-04-20 NOTE — TELEPHONE ENCOUNTER
Pending Prescriptions:                       Disp   Refills    traZODone (DESYREL) 100 MG tablet [Pharmac*90 tab*3        Sig: Take 1 tablet (100 mg) by mouth nightly as needed for           sleep    Routing refill request to provider for review/approval because:  Patient needs to be seen because it has been more than 1 year since last office visit.

## 2022-04-21 RX ORDER — TRAZODONE HYDROCHLORIDE 100 MG/1
100 TABLET ORAL
Qty: 30 TABLET | Refills: 0 | Status: SHIPPED | OUTPATIENT
Start: 2022-04-21 | End: 2022-05-24

## 2022-05-22 DIAGNOSIS — G47.00 PERSISTENT INSOMNIA: ICD-10-CM

## 2022-05-23 NOTE — PROGRESS NOTES
SUBJECTIVE:   CC: Antoinette COYLE Clayton Sherwood is an 56 year old woman who presents for preventive health visit.       Patient has been advised of split billing requirements and indicates understanding: Yes  Healthy Habits:     Getting at least 3 servings of Calcium per day:  Yes    Bi-annual eye exam:  Yes    Dental care twice a year:  Yes    Sleep apnea or symptoms of sleep apnea:  None    Diet:  Low salt and Low fat/cholesterol    Frequency of exercise:  2-3 days/week    Duration of exercise:  30-45 minutes    Taking medications regularly:  Yes    Medication side effects:  Other    PHQ-2 Total Score: 0    Additional concerns today:  No    Routine Health Maintenance:  Fasting: yes  Immunizations Due For: covid booster. Had 1 J&J dose.   Mammogram: last 12/2021.  Colonoscopy: 12/2021. F/U in 5 yrs.     GYN Hx: s/p hyst- pap no longer indicated (DUB) . Has ovaries. No hot flashes.   Denies GYN concerns of PCB, pelvic pain, dyspareunia, vaginal discharge  Sexually active: yes.   STD screening: no     Prior lipids-  and HDL >50. No fam CAD/stroke  Prior diabetes screening- normal last year, no GDM. Mother DM while on prednisone.     For exercise: goes to gym - lifting and muscle exercise; elliptical and treadmill at home; feels good with exercise. Limitations with exercise due to: nothing. Denies CV sxs with exercise including CP, SOB, palpitations, ESTEVEZ, presyncope.    HTN- hydrochlorothiazide 25mg and lisinopril 5mg  Has not taken them for a few weeks. BP here 124/82 (off her meds).   Had a nurse check her BP and was elevated. On meds 11  Years.   Developed cough on lisinopril that got better with being off it past few weeks.   Exercise as above  Watches sodium in diet.   Nonsmoker.     Mental health- zoloft 100mg, wellbutrin SR 150mg and trazodone 100mg  Has been on this combination a long time  Feels it works well for her. Stable.     Migraines: current meds:  maxalt  Stable: Yes   Pain pattern: a few per  month. maxalt works well. No side effects with it. +nausea. Aura w/migraine (every time).   Triggers: sometimes wakes w/them.   Prior preventive therapy: never needed.    Hip pain- lateral on RIGHT- pain when laying on it. Outside of right leg.   Has had for years. Started after was knocked down by her dog onto the ice a few years ago.   No buttock or groin pain or ant thigh pain.   Did PT. Never saw orthopedics.   Using ultram - not recently- over a year.       Today's PHQ-2 Score:   PHQ-2 ( 1999 Pfizer) 5/26/2022   Q1: Little interest or pleasure in doing things 0   Q2: Feeling down, depressed or hopeless 0   PHQ-2 Score 0   PHQ-2 Total Score (12-17 Years)- Positive if 3 or more points; Administer PHQ-A if positive -   Q1: Little interest or pleasure in doing things Not at all   Q2: Feeling down, depressed or hopeless Not at all   PHQ-2 Score 0       Abuse: Current or Past (Physical, Sexual or Emotional) - No  Do you feel safe in your environment? Yes        Social History     Tobacco Use     Smoking status: Never Smoker     Smokeless tobacco: Never Used   Substance Use Topics     Alcohol use: Yes     Comment: occ glass of wine     If you drink alcohol do you typically have >3 drinks per day or >7 drinks per week? Not applicable    Alcohol Use 5/26/2022   Prescreen: >3 drinks/day or >7 drinks/week? No   Prescreen: >3 drinks/day or >7 drinks/week? -   No flowsheet data found.    Reviewed orders with patient.  Reviewed health maintenance and updated orders accordingly - Yes  Lab work is in process  Labs reviewed in EPIC  BP Readings from Last 3 Encounters:   05/26/22 124/82   02/17/21 132/74   03/11/20 110/70    Wt Readings from Last 3 Encounters:   05/26/22 87 kg (191 lb 14.4 oz)   02/17/21 83 kg (183 lb)   03/11/20 79.8 kg (176 lb)                  Patient Active Problem List   Diagnosis     CARDIOVASCULAR SCREENING; LDL GOAL LESS THAN 160     Mild major depression (H)     Benign hypertension     Pain of left  lower leg     Migraine with aura and without status migrainosus, not intractable     H/O coccidioidomycosis     Past Surgical History:   Procedure Laterality Date     CL AFF SURGICAL PATHOLOGY  2006    breast reduction     EXCISE LESION TRUNK  11/21/2012    Procedure: EXCISE LESION TRUNK;  Excision back mass;  Surgeon: Saqib Abraham MD;  Location: [facility]     HC CORRECT BUNION,METATARSAL OSTEOTOMY  12/03/09    right great toe     HC EXCISION LESION/TENDON-SHEATH/CAPSULE, FOOT  12/03/09     HC REMV TARSAL/METATARSAL BENIGN BONE LESN  12/03/09     HYSTERECTOMY, PAP NO LONGER INDICATED       HYSTERECTOMY, VAGINAL  2005    menorrhagia, normal paps     LAPAROSCOPIC CHOLECYSTECTOMY  2001     LITHOTRIPSY  2007    right kidney     TUBAL LIGATION  2001     ZZC NONSPECIFIC PROCEDURE  08/96    Left salpingectomy   Abstracted 07/01/02       Social History     Tobacco Use     Smoking status: Never Smoker     Smokeless tobacco: Never Used   Substance Use Topics     Alcohol use: Yes     Comment: occ glass of wine     Family History   Problem Relation Age of Onset     Thyroid Disease Father      Hypertension Father      Lipids Father      Eye Disorder Mother         glaucoma     Diabetes Mother      Genitourinary Problems Mother         kidney stones     Asthma No family hx of      C.A.D. No family hx of      Cerebrovascular Disease No family hx of      Breast Cancer No family hx of      Cancer - colorectal No family hx of      Prostate Cancer No family hx of      Alzheimer Disease No family hx of      Arthritis No family hx of      Blood Disease No family hx of      Cancer No family hx of      Cardiovascular No family hx of      Circulatory No family hx of      Gastrointestinal Disease No family hx of      Heart Disease No family hx of      Musculoskeletal Disorder No family hx of      Neurologic Disorder No family hx of      Respiratory No family hx of          Current Outpatient Medications   Medication Sig Dispense Refill      aspirin 81 MG chewable tablet Take 1 tablet (81 mg) by mouth daily 1 tablet 0     buPROPion (WELLBUTRIN XL) 150 MG 24 hr tablet Take 1 tablet (150 mg) by mouth every morning 90 tablet 3     CALCIUM 600 + D 600-200 MG-UNIT OR TABS takes two tabs per day 3 MONTHS 1 YEAR     CENTRUM SILVER OR TABS 1 TABLET DAILY       Cholecalciferol (VITAMIN D3) 1000 UNIT TABS Take 2 tablets by mouth daily.       hydrochlorothiazide (HYDRODIURIL) 25 MG tablet Take 1 tablet (25 mg) by mouth daily 90 tablet 3     ibuprofen (IBU) 600 MG tablet Take 1 tablet by mouth every 6 hours as needed for pain. 60 tablet 0     Magnesium 500 MG TABS Take  by mouth.       OMEGA-3 CF 1000 MG OR CAPS   0     omeprazole (PRILOSEC) 20 MG DR capsule Take 20 mg by mouth daily       rizatriptan (MAXALT) 10 MG tablet Take 1 tablet (10 mg) by mouth at onset of headache for migraine (may repeat dose in 2 hour if needed) 18 tablet 1     sertraline (ZOLOFT) 100 MG tablet Take 1 tablet (100 mg) by mouth daily 90 tablet 3     traZODone (DESYREL) 100 MG tablet Take 1 tablet (100 mg) by mouth nightly as needed for sleep 90 tablet 3     Vitamin E (TOCOPHEROL) 1000 UNIT capsule Take 3 capsules by mouth daily.       PEPCID AC OR None Entered       RESTASIS MULTIDOSE 0.05 % ophthalmic emulsion  (Patient not taking: Reported on 5/26/2022)  2     Allergies   Allergen Reactions     Sulfa Drugs Nausea and Vomiting       Breast Cancer Screening:    FHS-7: No flowsheet data found.    Mammogram Screening: Recommended mammography every 1-2 years with patient discussion and risk factor consideration  Pertinent mammograms are reviewed under the imaging tab.    History of abnormal Pap smear: Status post benign hysterectomy. Health Maintenance and Surgical History updated.  PAP / HPV 10/29/2008 7/11/2005   PAP (Historical) NIL OTHER-NIL EM>40     Reviewed and updated as needed this visit by clinical staff   Tobacco  Allergies                 Reviewed and updated as needed this  "visit by Provider                       Review of Systems   Constitutional: Negative for chills and fever.   HENT: Positive for hearing loss. Negative for congestion, ear pain and sore throat.    Eyes: Negative for pain and visual disturbance.   Respiratory: Positive for cough and shortness of breath.    Cardiovascular: Positive for palpitations. Negative for chest pain and peripheral edema.   Gastrointestinal: Positive for heartburn. Negative for abdominal pain, constipation, diarrhea, hematochezia and nausea.   Breasts:  Negative for tenderness, breast mass and discharge.   Genitourinary: Negative for dysuria, frequency, genital sores, hematuria, pelvic pain, urgency, vaginal bleeding and vaginal discharge.   Musculoskeletal: Negative for arthralgias, joint swelling and myalgias.   Skin: Negative for rash.   Neurological: Negative for dizziness, weakness and headaches.   Psychiatric/Behavioral: Negative for mood changes. The patient is not nervous/anxious.    Palpitations- I think it may be caffeine. Has a single heavy beat occasionally. Not racing. No CP, SOB, presyncope with these. Normal exercise tolerance.   GERD- cough at night from it laying. Worse in last 6months. trys to avoid food triggers. Taking prilosec 20mg daily for a long time. Denies dysphagia, abd pain, N/V.      OBJECTIVE:   /82   Pulse 68   Temp 98.1  F (36.7  C) (Temporal)   Resp 14   Ht 1.68 m (5' 6.14\")   Wt 87 kg (191 lb 14.4 oz)   LMP 07/01/2005   SpO2 95%   BMI 30.84 kg/m    Physical Exam  GENERAL: healthy, alert and no distress  EYES: Eyes grossly normal to inspection, PERRL and conjunctivae and sclerae normal  HENT: ear canals and TM's normal, nose and mouth without ulcers or lesions  NECK: no adenopathy, no asymmetry, masses, or scars and thyroid normal to palpation  RESP: lungs clear to auscultation - no rales, rhonchi or wheezes  BREAST: normal without masses, tenderness or nipple discharge and no palpable axillary " masses or adenopathy  CV: regular rate and rhythm, normal S1 S2, no S3 or S4, no murmur, click or rub, no peripheral edema and peripheral pulses strong  ABDOMEN: soft, nontender, no hepatosplenomegaly, no masses and bowel sounds normal  MS: right hip : tender with palpation of GT bursa at lateral hip, normal ROM, no groin or buttock tenderness; no gross musculoskeletal defects noted, no edema  SKIN: no suspicious lesions or rashes  NEURO: Normal strength and tone, mentation intact and speech normal  PSYCH: mentation appears normal, affect normal/bright  LYMPH: normal ant/post cervical, supraclavicular nodes    Diagnostic Test Results:  Labs reviewed in Epic  Results for orders placed or performed in visit on 05/26/22 (from the past 24 hour(s))   CBC with platelets   Result Value Ref Range    WBC Count 6.0 4.0 - 11.0 10e3/uL    RBC Count 5.32 (H) 3.80 - 5.20 10e6/uL    Hemoglobin 15.2 11.7 - 15.7 g/dL    Hematocrit 47.3 (H) 35.0 - 47.0 %    MCV 89 78 - 100 fL    MCH 28.6 26.5 - 33.0 pg    MCHC 32.1 31.5 - 36.5 g/dL    RDW 13.5 10.0 - 15.0 %    Platelet Count 220 150 - 450 10e3/uL   Comprehensive metabolic panel   Result Value Ref Range    Sodium 142 133 - 144 mmol/L    Potassium 4.5 3.4 - 5.3 mmol/L    Chloride 109 94 - 109 mmol/L    Carbon Dioxide (CO2) 30 20 - 32 mmol/L    Anion Gap 3 3 - 14 mmol/L    Urea Nitrogen 22 7 - 30 mg/dL    Creatinine 0.90 0.52 - 1.04 mg/dL    Calcium 9.4 8.5 - 10.1 mg/dL    Glucose 101 (H) 70 - 99 mg/dL    Alkaline Phosphatase 83 40 - 150 U/L    AST 16 0 - 45 U/L    ALT 25 0 - 50 U/L    Protein Total 7.2 6.8 - 8.8 g/dL    Albumin 3.9 3.4 - 5.0 g/dL    Bilirubin Total 0.6 0.2 - 1.3 mg/dL    GFR Estimate 74 >60 mL/min/1.73m2   Lipid panel reflex to direct LDL Fasting   Result Value Ref Range    Cholesterol 212 (H) <200 mg/dL    Triglycerides 112 <150 mg/dL    Direct Measure HDL 60 >=50 mg/dL    LDL Cholesterol Calculated 130 (H) <=100 mg/dL    Non HDL Cholesterol 152 (H) <130 mg/dL     Patient Fasting > 8hrs? Yes     Narrative    Cholesterol  Desirable:  <200 mg/dL    Triglycerides  Normal:  Less than 150 mg/dL  Borderline High:  150-199 mg/dL  High:  200-499 mg/dL  Very High:  Greater than or equal to 500 mg/dL    Direct Measure HDL  Female:  Greater than or equal to 50 mg/dL   Male:  Greater than or equal to 40 mg/dL    LDL Cholesterol  Desirable:  <100mg/dL  Above Desirable:  100-129 mg/dL   Borderline High:  130-159 mg/dL   High:  160-189 mg/dL   Very High:  >= 190 mg/dL    Non HDL Cholesterol  Desirable:  130 mg/dL  Above Desirable:  130-159 mg/dL  Borderline High:  160-189 mg/dL  High:  190-219 mg/dL  Very High:  Greater than or equal to 220 mg/dL   TSH with free T4 reflex   Result Value Ref Range    TSH 0.84 0.40 - 4.00 mU/L       ASSESSMENT/PLAN:       ICD-10-CM    1. Routine general medical examination at a health care facility  Z00.00 COVID-19,PF,MODERNA (18+ YRS BOOSTER .25ML)     CBC with platelets     Comprehensive metabolic panel     Lipid panel reflex to direct LDL Fasting     TSH with free T4 reflex     CBC with platelets     Comprehensive metabolic panel     Lipid panel reflex to direct LDL Fasting     TSH with free T4 reflex   2. Benign hypertension  I10 hydrochlorothiazide (HYDRODIURIL) 25 MG tablet   3. Migraine with aura and without status migrainosus, not intractable  G43.109 rizatriptan (MAXALT) 10 MG tablet   4. Current mild episode of major depressive disorder, unspecified whether recurrent (H)  F32.0 buPROPion (WELLBUTRIN XL) 150 MG 24 hr tablet     sertraline (ZOLOFT) 100 MG tablet   5. Persistent insomnia  G47.00 traZODone (DESYREL) 100 MG tablet   6. Trochanteric bursitis of right hip  M70.61    7. Gastroesophageal reflux disease without esophagitis  K21.9    8. Palpitations  R00.2    9. Elevated hematocrit  R71.8 Iron and iron binding capacity     Ferritin     1. Routine general medical examination at a health care facility  Reviewed VS and weight/BMI with pt    Immunizations:   updated - see below -gave moderna  Counseling as below   Health screenings/maintenance per orders/comments below  When applicable based on age, personal hx, fam hx, and RF- counseled on appropriate cancer screenings.    Reviewed with her -her  prior lipids and prior glucose from 2021.     - COVID-19,PF,MODERNA (18+ YRS BOOSTER .25ML)  - CBC with platelets; Future  - Comprehensive metabolic panel; Future  - Lipid panel reflex to direct LDL Fasting; Future  - TSH with free T4 reflex; Future  - CBC with platelets  - Comprehensive metabolic panel  - Lipid panel reflex to direct LDL Fasting  - TSH with free T4 reflex    2. Benign hypertension  Pt has been off meds for a few weeks.   BP here in range.   Did develop cough side effect on lisinopril  Restart only the hydrochlorothiazide (monotherapy). 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   If BP are >140/90 she will follow up. Would start an ARB.   Labs today  Heart healthy eating and regular exercise advised   - hydrochlorothiazide (HYDRODIURIL) 25 MG tablet; Take 1 tablet (25 mg) by mouth daily  Dispense: 90 tablet; Refill: 3    3. Migraine with aura and without status migrainosus, not intractable  Refilled for prn use   Discussed medications, max dosing and follow up. Migraine pattern is stable. Meds refilled. Discussed migraine dx, when to seek care for change in frequency, intensity, and warning signs of changing features of migraine pattern.    - rizatriptan (MAXALT) 10 MG tablet; Take 1 tablet (10 mg) by mouth at onset of headache for migraine (may repeat dose in 2 hour if needed)  Dispense: 18 tablet; Refill: 1    4. Current mild episode of major depressive disorder, unspecified whether recurrent (H)  Stable on combination of medications long term.   Feels sx well controlled, tolerates without side effects.  Refilled   - buPROPion (WELLBUTRIN XL) 150 MG 24 hr tablet; Take 1 tablet (150 mg) by mouth every morning  Dispense: 90  "tablet; Refill: 3  - sertraline (ZOLOFT) 100 MG tablet; Take 1 tablet (100 mg) by mouth daily  Dispense: 90 tablet; Refill: 3    5. Persistent insomnia  Refilled. discussed seratonin syndrome s/sx. Has been on long term and tolerates.   - traZODone (DESYREL) 100 MG tablet; Take 1 tablet (100 mg) by mouth nightly as needed for sleep  Dispense: 90 tablet; Refill: 3    6. Trochanteric bursitis of right hip  Heat, stretching. Advise sports med to consider injection if worsening (vs refilling tramadol).     7. Gastroesophageal reflux disease without esophagitis  Taking otc prilsec 20mg. She will increase to 40mg daily for 8 weeks. If sx improve and resolve after short term higher dose, reduce back to 20mg.   If sx not improving to see GI. Avoid triggers.     8. Palpitations  Sx by her description sound like single pac/pvc. Discussed reducing caffeine, alcohol.   Discussed would advise Zio if tachycardia, sx more than a single beat or if other associated sx. She will reach out if pattern changes.    9. Elevated hematocrit  Reviewing CBC and flowsheet has had elevated hgb in the past. normla today but hematocrit elevated. Add on iron studies and ferritin below.   - Iron and iron binding capacity; Future  - Ferritin; Future      Patient has been advised of split billing requirements and indicates understanding: Yes    COUNSELING:  Reviewed preventive health counseling, as reflected in patient instructions       Regular exercise       Healthy diet/nutrition       Osteoporosis prevention/bone health       Colorectal Cancer Screening       vaccines, mammogram    Estimated body mass index is 29.99 kg/m  as calculated from the following:    Height as of 2/17/21: 1.664 m (5' 5.5\").    Weight as of 2/17/21: 83 kg (183 lb).    Weight management plan: Discussed healthy diet and exercise guidelines    She reports that she has never smoked. She has never used smokeless tobacco.      Counseling Resources:  ATP IV Guidelines  Pooled " Cohorts Equation Calculator  Breast Cancer Risk Calculator  BRCA-Related Cancer Risk Assessment: FHS-7 Tool  FRAX Risk Assessment  ICSI Preventive Guidelines  Dietary Guidelines for Americans, 2010  USDA's MyPlate  ASA Prophylaxis  Lung CA Screening    Sugar Hollingsworth PA-C  [facility]  Answers for HPI/ROS submitted by the patient on 5/26/2022  If you checked off any problems, how difficult have these problems made it for you to do your work, take care of things at home, or get along with other people?: Not difficult at all  PHQ9 TOTAL SCORE: 1

## 2022-05-24 RX ORDER — TRAZODONE HYDROCHLORIDE 100 MG/1
100 TABLET ORAL
Qty: 30 TABLET | Refills: 0 | Status: SHIPPED | OUTPATIENT
Start: 2022-05-24 | End: 2022-05-26

## 2022-05-24 NOTE — TELEPHONE ENCOUNTER
Pending Prescriptions:                       Disp   Refills    traZODone (DESYREL) 100 MG tablet [Pharma*30 tab*0            Sig: TAKE 1 TABLET (100 MG) BY MOUTH NIGHTLY AS NEEDED           FOR SLEEP    Medication is being filled for 1 time juan antonio refill only due to:  Patient is due for appointment.           Please call and help schedule.  Thank you!

## 2022-05-26 ENCOUNTER — OFFICE VISIT (OUTPATIENT)
Dept: FAMILY MEDICINE | Facility: CLINIC | Age: 57
End: 2022-05-26
Payer: COMMERCIAL

## 2022-05-26 VITALS
HEART RATE: 68 BPM | SYSTOLIC BLOOD PRESSURE: 124 MMHG | BODY MASS INDEX: 30.84 KG/M2 | TEMPERATURE: 98.1 F | OXYGEN SATURATION: 95 % | RESPIRATION RATE: 14 BRPM | HEIGHT: 66 IN | WEIGHT: 191.9 LBS | DIASTOLIC BLOOD PRESSURE: 82 MMHG

## 2022-05-26 DIAGNOSIS — R00.2 PALPITATIONS: ICD-10-CM

## 2022-05-26 DIAGNOSIS — K21.9 GASTROESOPHAGEAL REFLUX DISEASE WITHOUT ESOPHAGITIS: ICD-10-CM

## 2022-05-26 DIAGNOSIS — R71.8 ELEVATED HEMATOCRIT: ICD-10-CM

## 2022-05-26 DIAGNOSIS — I10 BENIGN HYPERTENSION: ICD-10-CM

## 2022-05-26 DIAGNOSIS — M70.61 TROCHANTERIC BURSITIS OF RIGHT HIP: ICD-10-CM

## 2022-05-26 DIAGNOSIS — G43.109 MIGRAINE WITH AURA AND WITHOUT STATUS MIGRAINOSUS, NOT INTRACTABLE: ICD-10-CM

## 2022-05-26 DIAGNOSIS — F32.0 CURRENT MILD EPISODE OF MAJOR DEPRESSIVE DISORDER, UNSPECIFIED WHETHER RECURRENT (H): ICD-10-CM

## 2022-05-26 DIAGNOSIS — G47.00 PERSISTENT INSOMNIA: ICD-10-CM

## 2022-05-26 DIAGNOSIS — Z00.00 ROUTINE GENERAL MEDICAL EXAMINATION AT A HEALTH CARE FACILITY: Primary | ICD-10-CM

## 2022-05-26 PROBLEM — R73.01 IMPAIRED FASTING GLUCOSE: Chronic | Status: ACTIVE | Noted: 2022-05-26

## 2022-05-26 PROBLEM — R73.01 IMPAIRED FASTING GLUCOSE: Status: ACTIVE | Noted: 2022-05-26

## 2022-05-26 LAB
ALBUMIN SERPL-MCNC: 3.9 G/DL (ref 3.4–5)
ALP SERPL-CCNC: 83 U/L (ref 40–150)
ALT SERPL W P-5'-P-CCNC: 25 U/L (ref 0–50)
ANION GAP SERPL CALCULATED.3IONS-SCNC: 3 MMOL/L (ref 3–14)
AST SERPL W P-5'-P-CCNC: 16 U/L (ref 0–45)
BILIRUB SERPL-MCNC: 0.6 MG/DL (ref 0.2–1.3)
BUN SERPL-MCNC: 22 MG/DL (ref 7–30)
CALCIUM SERPL-MCNC: 9.4 MG/DL (ref 8.5–10.1)
CHLORIDE BLD-SCNC: 109 MMOL/L (ref 94–109)
CHOLEST SERPL-MCNC: 212 MG/DL
CO2 SERPL-SCNC: 30 MMOL/L (ref 20–32)
CREAT SERPL-MCNC: 0.9 MG/DL (ref 0.52–1.04)
ERYTHROCYTE [DISTWIDTH] IN BLOOD BY AUTOMATED COUNT: 13.5 % (ref 10–15)
FASTING STATUS PATIENT QL REPORTED: YES
FERRITIN SERPL-MCNC: 32 NG/ML (ref 8–252)
GFR SERPL CREATININE-BSD FRML MDRD: 74 ML/MIN/1.73M2
GLUCOSE BLD-MCNC: 101 MG/DL (ref 70–99)
HCT VFR BLD AUTO: 47.3 % (ref 35–47)
HDLC SERPL-MCNC: 60 MG/DL
HGB BLD-MCNC: 15.2 G/DL (ref 11.7–15.7)
IRON SATN MFR SERPL: 36 % (ref 15–46)
IRON SERPL-MCNC: 149 UG/DL (ref 35–180)
LDLC SERPL CALC-MCNC: 130 MG/DL
MCH RBC QN AUTO: 28.6 PG (ref 26.5–33)
MCHC RBC AUTO-ENTMCNC: 32.1 G/DL (ref 31.5–36.5)
MCV RBC AUTO: 89 FL (ref 78–100)
NONHDLC SERPL-MCNC: 152 MG/DL
PLATELET # BLD AUTO: 220 10E3/UL (ref 150–450)
POTASSIUM BLD-SCNC: 4.5 MMOL/L (ref 3.4–5.3)
PROT SERPL-MCNC: 7.2 G/DL (ref 6.8–8.8)
RBC # BLD AUTO: 5.32 10E6/UL (ref 3.8–5.2)
SODIUM SERPL-SCNC: 142 MMOL/L (ref 133–144)
TIBC SERPL-MCNC: 415 UG/DL (ref 240–430)
TRIGL SERPL-MCNC: 112 MG/DL
TSH SERPL DL<=0.005 MIU/L-ACNC: 0.84 MU/L (ref 0.4–4)
WBC # BLD AUTO: 6 10E3/UL (ref 4–11)

## 2022-05-26 PROCEDURE — 91306 COVID-19,PF,MODERNA (18+ YRS BOOSTER .25ML): CPT | Performed by: PHYSICIAN ASSISTANT

## 2022-05-26 PROCEDURE — 99214 OFFICE O/P EST MOD 30 MIN: CPT | Mod: 25 | Performed by: PHYSICIAN ASSISTANT

## 2022-05-26 PROCEDURE — 80053 COMPREHEN METABOLIC PANEL: CPT | Performed by: PHYSICIAN ASSISTANT

## 2022-05-26 PROCEDURE — 82728 ASSAY OF FERRITIN: CPT | Performed by: PHYSICIAN ASSISTANT

## 2022-05-26 PROCEDURE — 83550 IRON BINDING TEST: CPT | Performed by: PHYSICIAN ASSISTANT

## 2022-05-26 PROCEDURE — 80061 LIPID PANEL: CPT | Performed by: PHYSICIAN ASSISTANT

## 2022-05-26 PROCEDURE — 0064A COVID-19,PF,MODERNA (18+ YRS BOOSTER .25ML): CPT | Performed by: PHYSICIAN ASSISTANT

## 2022-05-26 PROCEDURE — 36415 COLL VENOUS BLD VENIPUNCTURE: CPT | Performed by: PHYSICIAN ASSISTANT

## 2022-05-26 PROCEDURE — 84443 ASSAY THYROID STIM HORMONE: CPT | Performed by: PHYSICIAN ASSISTANT

## 2022-05-26 PROCEDURE — 99396 PREV VISIT EST AGE 40-64: CPT | Mod: 25 | Performed by: PHYSICIAN ASSISTANT

## 2022-05-26 PROCEDURE — 85027 COMPLETE CBC AUTOMATED: CPT | Performed by: PHYSICIAN ASSISTANT

## 2022-05-26 RX ORDER — RIZATRIPTAN BENZOATE 10 MG/1
10 TABLET ORAL
Qty: 18 TABLET | Refills: 1 | Status: SHIPPED | OUTPATIENT
Start: 2022-05-26 | End: 2023-06-29

## 2022-05-26 RX ORDER — HYDROCHLOROTHIAZIDE 25 MG/1
25 TABLET ORAL DAILY
Qty: 90 TABLET | Refills: 3 | Status: SHIPPED | OUTPATIENT
Start: 2022-05-26 | End: 2023-05-25

## 2022-05-26 RX ORDER — SERTRALINE HYDROCHLORIDE 100 MG/1
100 TABLET, FILM COATED ORAL DAILY
Qty: 90 TABLET | Refills: 3 | Status: SHIPPED | OUTPATIENT
Start: 2022-05-26 | End: 2023-05-25

## 2022-05-26 RX ORDER — TRAZODONE HYDROCHLORIDE 100 MG/1
100 TABLET ORAL
Qty: 90 TABLET | Refills: 3 | Status: SHIPPED | OUTPATIENT
Start: 2022-05-26 | End: 2023-06-22

## 2022-05-26 RX ORDER — BUPROPION HYDROCHLORIDE 150 MG/1
150 TABLET ORAL EVERY MORNING
Qty: 90 TABLET | Refills: 3 | Status: SHIPPED | OUTPATIENT
Start: 2022-05-26 | End: 2023-05-25

## 2022-05-26 ASSESSMENT — ENCOUNTER SYMPTOMS
HEMATURIA: 0
HEARTBURN: 1
FEVER: 0
DIZZINESS: 0
NERVOUS/ANXIOUS: 0
NAUSEA: 0
ARTHRALGIAS: 0
HEMATOCHEZIA: 0
JOINT SWELLING: 0
MYALGIAS: 0
FREQUENCY: 0
ABDOMINAL PAIN: 0
EYE PAIN: 0
SHORTNESS OF BREATH: 1
BREAST MASS: 0
HEADACHES: 0
WEAKNESS: 0
PALPITATIONS: 1
DYSURIA: 0
CONSTIPATION: 0
DIARRHEA: 0
SORE THROAT: 0
COUGH: 1
CHILLS: 0

## 2022-05-26 ASSESSMENT — PAIN SCALES - GENERAL: PAINLEVEL: NO PAIN (0)

## 2022-05-26 ASSESSMENT — PATIENT HEALTH QUESTIONNAIRE - PHQ9
SUM OF ALL RESPONSES TO PHQ QUESTIONS 1-9: 1
10. IF YOU CHECKED OFF ANY PROBLEMS, HOW DIFFICULT HAVE THESE PROBLEMS MADE IT FOR YOU TO DO YOUR WORK, TAKE CARE OF THINGS AT HOME, OR GET ALONG WITH OTHER PEOPLE: NOT DIFFICULT AT ALL
SUM OF ALL RESPONSES TO PHQ QUESTIONS 1-9: 1

## 2022-06-07 ENCOUNTER — E-VISIT (OUTPATIENT)
Dept: FAMILY MEDICINE | Facility: CLINIC | Age: 57
End: 2022-06-07
Payer: COMMERCIAL

## 2022-06-07 DIAGNOSIS — I10 BENIGN HYPERTENSION: Primary | ICD-10-CM

## 2022-06-07 PROCEDURE — 99421 OL DIG E/M SVC 5-10 MIN: CPT | Performed by: PHYSICIAN ASSISTANT

## 2022-06-08 RX ORDER — LOSARTAN POTASSIUM 25 MG/1
25 TABLET ORAL DAILY
Qty: 30 TABLET | Refills: 1 | Status: SHIPPED | OUTPATIENT
Start: 2022-06-08 | End: 2022-07-01

## 2022-06-08 NOTE — PATIENT INSTRUCTIONS
Thank you for choosing us for your care. I have placed an order for a prescription so that you can start treatment. View your full visit summary for details by clicking on the link below. Your pharmacist will able to address any questions you may have about the medication.     If you're not feeling better within 5-7 days, please schedule an appointment.  You can schedule an appointment right here in [facility], or call [phone]  If the visit is for the same symptoms as your eVisit, we'll refund the cost of your eVisit if seen within seven days.

## 2022-06-08 NOTE — TELEPHONE ENCOUNTER
1. 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   BMP in 2-3 weeks.   OV if side effects or pressures not improving as expected.   - losartan (COZAAR) 25 MG tablet; Take 1 tablet (25 mg) by mouth daily  Dispense: 30 tablet; Refill: 1  - Basic metabolic panel  (Ca, Cl, CO2, Creat, Gluc, K, Na, BUN); Future      Provider E-Visit time total (minutes): 8 minutes.    Sugar Hollingsworth PA-C

## 2022-06-28 DIAGNOSIS — I10 BENIGN HYPERTENSION: ICD-10-CM

## 2022-06-28 RX ORDER — LISINOPRIL 5 MG/1
TABLET ORAL
Qty: 90 TABLET | Refills: 3 | OUTPATIENT
Start: 2022-06-28

## 2022-06-28 NOTE — TELEPHONE ENCOUNTER
Visit note of 5/26/22 states that med was discontinued due to side effects.     Whitney Ambrose RN on 6/28/2022 at 3:58 PM

## 2022-07-01 DIAGNOSIS — I10 BENIGN HYPERTENSION: ICD-10-CM

## 2022-07-01 RX ORDER — LOSARTAN POTASSIUM 25 MG/1
TABLET ORAL
Qty: 30 TABLET | Refills: 8 | Status: SHIPPED | OUTPATIENT
Start: 2022-07-01 | End: 2023-03-28

## 2022-10-15 ENCOUNTER — HEALTH MAINTENANCE LETTER (OUTPATIENT)
Age: 57
End: 2022-10-15

## 2023-03-26 ENCOUNTER — HEALTH MAINTENANCE LETTER (OUTPATIENT)
Age: 58
End: 2023-03-26

## 2023-03-26 DIAGNOSIS — I10 BENIGN HYPERTENSION: ICD-10-CM

## 2023-03-26 NOTE — LETTER
March 28, 2023      Antoinette Sherwood  [address] [facility] [address] [address]              Dear Antoinette,      We just wanted to let you know that we sent in a refill for your losartan but you are due for a physical and establish care visit before your next refill is due.  Sherry is no longer a family practice provider    Please give us a call at [phone] or use On[employer]ido to schedule.     Have a great day.    Your [facility] Team

## 2023-03-28 RX ORDER — LOSARTAN POTASSIUM 25 MG/1
TABLET ORAL
Qty: 90 TABLET | Refills: 0 | Status: SHIPPED | OUTPATIENT
Start: 2023-03-28 | End: 2023-06-23

## 2023-03-28 NOTE — TELEPHONE ENCOUNTER
Pending Prescriptions:                       Disp   Refills    losartan (COZAAR) 25 MG tablet [Pharmacy *90 tab*0            Sig: TAKE 1 TABLET BY MOUTH EVERY DAY    Medication is being filled for 1 time juan antonio refill only due to:  Patient is due for routine preventative (around 5/26/23).    Please call and help schedule.  Thank you!    Whitney Ambrose RN on 3/28/2023 at 12:49 PM

## 2023-05-21 ENCOUNTER — TELEPHONE (OUTPATIENT)
Dept: FAMILY MEDICINE | Facility: CLINIC | Age: 58
End: 2023-05-21
Payer: COMMERCIAL

## 2023-05-21 DIAGNOSIS — I10 BENIGN HYPERTENSION: ICD-10-CM

## 2023-05-21 DIAGNOSIS — F32.0 CURRENT MILD EPISODE OF MAJOR DEPRESSIVE DISORDER, UNSPECIFIED WHETHER RECURRENT (H): ICD-10-CM

## 2023-05-21 NOTE — LETTER
May 30, 2023      Antoinette Sherwood  [address] [address] [facility] [address]              Your provider has sent a juan antonio refill for your hydrochlorothiazide (HYDRODIURIL), sertraline (ZOLOFT), and buPROPion (WELLBUTRIN XL). You are due for a visit before any more refills will be sent. If the dates are too far out, please call us and we can pull some strings due to the last appointment being cancelled.      Please schedule via [employer] or call [phone].      Have a good day,      [facility]

## 2023-05-25 RX ORDER — BUPROPION HYDROCHLORIDE 150 MG/1
TABLET ORAL
Qty: 30 TABLET | Refills: 0 | Status: SHIPPED | OUTPATIENT
Start: 2023-05-25 | End: 2023-06-15

## 2023-05-25 RX ORDER — SERTRALINE HYDROCHLORIDE 100 MG/1
TABLET, FILM COATED ORAL
Qty: 30 TABLET | Refills: 3 | Status: SHIPPED | OUTPATIENT
Start: 2023-05-25 | End: 2023-06-29

## 2023-05-25 RX ORDER — HYDROCHLOROTHIAZIDE 25 MG/1
TABLET ORAL
Qty: 30 TABLET | Refills: 0 | Status: SHIPPED | OUTPATIENT
Start: 2023-05-25 | End: 2023-06-21

## 2023-05-25 NOTE — TELEPHONE ENCOUNTER
"Pending Prescriptions:                       Disp   Refills    hydrochlorothiazide (HYDRODIURIL) 25 MG ta*90 tab*3        Sig: TAKE 1 TABLET BY MOUTH EVERY DAY    sertraline (ZOLOFT) 100 MG tablet [Pharmac*90 tab*3        Sig: TAKE 1 TABLET BY MOUTH EVERY DAY    buPROPion (WELLBUTRIN XL) 150 MG 24 hr tab*90 tab*3        Sig: TAKE 1 TABLET BY MOUTH EVERY MORNING    Routing refill request to provider for review/approval because:  Requested Prescriptions   Pending Prescriptions Disp Refills    hydrochlorothiazide (HYDRODIURIL) 25 MG tablet [Pharmacy Med Name: HYDROCHLOROTHIAZIDE 25 MG TAB] 90 tablet 3     Sig: TAKE 1 TABLET BY MOUTH EVERY DAY       Diuretics (Including Combos) Protocol Passed - 5/21/2023  7:55 AM        Passed - Blood pressure under 140/90 in past 12 months     BP Readings from Last 3 Encounters:   05/26/22 124/82   02/17/21 132/74   03/11/20 110/70                 Passed - Recent (12 mo) or future (30 days) visit within the authorizing provider's specialty     Patient has had an office visit with the authorizing provider or a provider within the authorizing providers department within the previous 12 mos or has a future within next 30 days. See \"Patient Info\" tab in inbasket, or \"Choose Columns\" in Meds & Orders section of the refill encounter.              Passed - Medication is active on med list        Passed - Patient is age 18 or older        Passed - No active pregancy on record        Passed - Normal serum creatinine on file in past 12 months     Recent Labs   Lab Test 05/26/22  1020   CR 0.90              Passed - Normal serum potassium on file in past 12 months     Recent Labs   Lab Test 05/26/22  1020   POTASSIUM 4.5                    Passed - Normal serum sodium on file in past 12 months     Recent Labs   Lab Test 05/26/22  1020                 Passed - No positive pregnancy test in past 12 months          sertraline (ZOLOFT) 100 MG tablet [Pharmacy Med Name: SERTRALINE  MG " "TABLET] 90 tablet 3     Sig: TAKE 1 TABLET BY MOUTH EVERY DAY       SSRIs Protocol Failed - 5/21/2023  7:55 AM        Failed - PHQ-9 score less than 5 in past 6 months     Please review last PHQ-9 score.           Passed - Medication is Bupropion     If the medication is Bupropion (Wellbutrin), and the patient is taking for smoking cessation; OK to refill.            Passed - Medication is active on med list        Passed - Patient is age 18 or older        Passed - No active pregnancy on record        Passed - No positive pregnancy test in last 12 months        Passed - Recent (6 mo) or future (30 days) visit within the authorizing provider's specialty     Patient had office visit in the last 6 months or has a visit in the next 30 days with authorizing provider or within the authorizing provider's specialty.  See \"Patient Info\" tab in inbasket, or \"Choose Columns\" in Meds & Orders section of the refill encounter.              buPROPion (WELLBUTRIN XL) 150 MG 24 hr tablet [Pharmacy Med Name: BUPROPION HCL  MG TABLET] 90 tablet 3     Sig: TAKE 1 TABLET BY MOUTH EVERY MORNING       SSRIs Protocol Failed - 5/21/2023  7:55 AM        Failed - PHQ-9 score less than 5 in past 6 months     Please review last PHQ-9 score.           Passed - Medication is Bupropion     If the medication is Bupropion (Wellbutrin), and the patient is taking for smoking cessation; OK to refill.            Passed - Medication is active on med list        Passed - Patient is age 18 or older        Passed - No active pregnancy on record        Passed - No positive pregnancy test in last 12 months        Passed - Recent (6 mo) or future (30 days) visit within the authorizing provider's specialty     Patient had office visit in the last 6 months or has a visit in the next 30 days with authorizing provider or within the authorizing provider's specialty.  See \"Patient Info\" tab in inbasket, or \"Choose Columns\" in Meds & Orders section of the " refill encounter.               Whitney Ambrose RN on 5/25/2023 at 7:50 AM

## 2023-05-25 NOTE — TELEPHONE ENCOUNTER
Had to cancel pt appt 23 (provider has a ).   Please reschedule.  Ok to use a same day or approval spot.  Filled 30 d.  Sugar Hollingsworth PA-C

## 2023-06-15 DIAGNOSIS — F32.0 CURRENT MILD EPISODE OF MAJOR DEPRESSIVE DISORDER, UNSPECIFIED WHETHER RECURRENT (H): ICD-10-CM

## 2023-06-15 RX ORDER — BUPROPION HYDROCHLORIDE 150 MG/1
TABLET ORAL
Qty: 30 TABLET | Refills: 0 | Status: SHIPPED | OUTPATIENT
Start: 2023-06-15 | End: 2023-06-29

## 2023-06-15 NOTE — TELEPHONE ENCOUNTER
Prescription approved per [facility] Refill Protocol.  Next 5 appointments (look out 90 days)    Jun 29, 2023 11:00 AM  (Arrive by 10:40 AM)  Adult Preventative Visit with Sugar Hollingsworth PA-C  [facility] ([facility] ) [address], [address]  [facility] [place] [facility] [address]  [phone]        THUY Small, RN, PHN  [place]  Janey 15, 2023

## 2023-06-17 DIAGNOSIS — I10 BENIGN HYPERTENSION: ICD-10-CM

## 2023-06-20 NOTE — TELEPHONE ENCOUNTER
"Pending Prescriptions:                       Disp   Refills    hydrochlorothiazide (HYDRODIURIL) 25 MG ta*30 tab*0        Sig: TAKE 1 TABLET BY MOUTH EVERY DAY        Routing refill request to provider for review/approval because:  Joyce given x1 and patient did not follow up, please advise  Next 5 appointments (look out 90 days)      Jun 29, 2023 11:00 AM  (Arrive by 10:40 AM)  Adult Preventative Visit with Sugar Hollingsworth PA-C  [facility] [place] ([facility] ) [address], [address]  [place] [id number] [phone]          Diuretics (Including Combos) Protocol Failed    Rerun Protocol (6/17/2023 7:46 AM)    Blood pressure under 140/90 in past 12 months        BP Readings from Last 3 Encounters:   05/26/22 124/82   02/17/21 132/74   03/11/20 110/70           Recent (12 mo) or future (30 days) visit within the authorizing provider's specialty    Patient has had an office visit with the authorizing provider or a provider within the authorizing providers department within the previous 12 mos or has a future within next 30 days. See \"Patient Info\" tab in inbasket, or \"Choose Columns\" in Meds & Orders section of the refill encounter.         Normal serum creatinine on file in past 12 months        Recent Labs   Lab Test 05/26/22  1020   CR 0.90        Normal serum potassium on file in past 12 months        Recent Labs   Lab Test 05/26/22  1020   POTASSIUM 4.5                 Normal serum sodium on file in past 12 months        Recent Labs   Lab Test 05/26/22  1020           Medication is active on med list      Patient is age 18 or older      No active pregancy on record      No positive pregnancy test in past 12 months          "

## 2023-06-21 RX ORDER — HYDROCHLOROTHIAZIDE 25 MG/1
TABLET ORAL
Qty: 30 TABLET | Refills: 0 | Status: SHIPPED | OUTPATIENT
Start: 2023-06-21 | End: 2023-06-29

## 2023-06-22 DIAGNOSIS — G47.00 PERSISTENT INSOMNIA: ICD-10-CM

## 2023-06-22 RX ORDER — TRAZODONE HYDROCHLORIDE 100 MG/1
100 TABLET ORAL
Qty: 30 TABLET | Refills: 0 | Status: SHIPPED | OUTPATIENT
Start: 2023-06-22 | End: 2023-06-29

## 2023-06-22 NOTE — TELEPHONE ENCOUNTER
Pending Prescriptions:                       Disp   Refills    traZODone (DESYREL) 100 MG tablet [Pharmac*90 tab*0        Sig: TAKE 1 TABLET (100 MG) BY MOUTH NIGHTLY AS NEEDED FOR           SLEEP    Routing refill request to provider for review/approval because:  Drug interaction warning    Appointments in Next Year      Jun 29, 2023 11:00 AM  (Arrive by 10:40 AM)  Adult Preventative Visit with Sugar Hollingsworth PA-C  [facility] ([facility] ) [phone]

## 2023-06-23 DIAGNOSIS — I10 BENIGN HYPERTENSION: ICD-10-CM

## 2023-06-23 RX ORDER — LOSARTAN POTASSIUM 25 MG/1
TABLET ORAL
Qty: 90 TABLET | Refills: 0 | Status: SHIPPED | OUTPATIENT
Start: 2023-06-23 | End: 2023-06-29

## 2023-06-29 ENCOUNTER — OFFICE VISIT (OUTPATIENT)
Dept: FAMILY MEDICINE | Facility: CLINIC | Age: 58
End: 2023-06-29
Payer: COMMERCIAL

## 2023-06-29 ENCOUNTER — ANCILLARY PROCEDURE (OUTPATIENT)
Dept: GENERAL RADIOLOGY | Facility: CLINIC | Age: 58
End: 2023-06-29
Attending: PHYSICIAN ASSISTANT
Payer: COMMERCIAL

## 2023-06-29 VITALS
TEMPERATURE: 97.9 F | OXYGEN SATURATION: 97 % | HEIGHT: 66 IN | HEART RATE: 64 BPM | SYSTOLIC BLOOD PRESSURE: 118 MMHG | WEIGHT: 188 LBS | BODY MASS INDEX: 30.22 KG/M2 | DIASTOLIC BLOOD PRESSURE: 76 MMHG

## 2023-06-29 DIAGNOSIS — G47.00 PERSISTENT INSOMNIA: ICD-10-CM

## 2023-06-29 DIAGNOSIS — I10 BENIGN HYPERTENSION: ICD-10-CM

## 2023-06-29 DIAGNOSIS — R06.09 DOE (DYSPNEA ON EXERTION): ICD-10-CM

## 2023-06-29 DIAGNOSIS — Z00.00 ROUTINE GENERAL MEDICAL EXAMINATION AT A HEALTH CARE FACILITY: Primary | ICD-10-CM

## 2023-06-29 DIAGNOSIS — Z12.31 VISIT FOR SCREENING MAMMOGRAM: ICD-10-CM

## 2023-06-29 DIAGNOSIS — F32.0 MILD MAJOR DEPRESSION (H): Chronic | ICD-10-CM

## 2023-06-29 DIAGNOSIS — G43.109 MIGRAINE WITH AURA AND WITHOUT STATUS MIGRAINOSUS, NOT INTRACTABLE: Chronic | ICD-10-CM

## 2023-06-29 DIAGNOSIS — R73.01 IMPAIRED FASTING GLUCOSE: Chronic | ICD-10-CM

## 2023-06-29 LAB
ALBUMIN SERPL BCG-MCNC: 4.5 G/DL (ref 3.5–5.2)
ALP SERPL-CCNC: 79 U/L (ref 35–104)
ALT SERPL W P-5'-P-CCNC: 22 U/L (ref 0–50)
ANION GAP SERPL CALCULATED.3IONS-SCNC: 11 MMOL/L (ref 7–15)
AST SERPL W P-5'-P-CCNC: 27 U/L (ref 0–45)
BILIRUB SERPL-MCNC: 0.6 MG/DL
BUN SERPL-MCNC: 18.8 MG/DL (ref 6–20)
CALCIUM SERPL-MCNC: 9.3 MG/DL (ref 8.6–10)
CHLORIDE SERPL-SCNC: 101 MMOL/L (ref 98–107)
CHOLEST SERPL-MCNC: 232 MG/DL
CREAT SERPL-MCNC: 0.93 MG/DL (ref 0.51–0.95)
DEPRECATED HCO3 PLAS-SCNC: 28 MMOL/L (ref 22–29)
ERYTHROCYTE [DISTWIDTH] IN BLOOD BY AUTOMATED COUNT: 13.5 % (ref 10–15)
GFR SERPL CREATININE-BSD FRML MDRD: 71 ML/MIN/1.73M2
GLUCOSE SERPL-MCNC: 99 MG/DL (ref 70–99)
HBA1C MFR BLD: 5.5 % (ref 0–5.6)
HCT VFR BLD AUTO: 45.5 % (ref 35–47)
HDLC SERPL-MCNC: 61 MG/DL
HGB BLD-MCNC: 15 G/DL (ref 11.7–15.7)
LDLC SERPL CALC-MCNC: 150 MG/DL
MCH RBC QN AUTO: 29.4 PG (ref 26.5–33)
MCHC RBC AUTO-ENTMCNC: 33 G/DL (ref 31.5–36.5)
MCV RBC AUTO: 89 FL (ref 78–100)
NONHDLC SERPL-MCNC: 171 MG/DL
PLATELET # BLD AUTO: 208 10E3/UL (ref 150–450)
POTASSIUM SERPL-SCNC: 3.6 MMOL/L (ref 3.4–5.3)
PROT SERPL-MCNC: 7.3 G/DL (ref 6.4–8.3)
RBC # BLD AUTO: 5.1 10E6/UL (ref 3.8–5.2)
SODIUM SERPL-SCNC: 140 MMOL/L (ref 136–145)
TRIGL SERPL-MCNC: 105 MG/DL
WBC # BLD AUTO: 4.9 10E3/UL (ref 4–11)

## 2023-06-29 PROCEDURE — 99214 OFFICE O/P EST MOD 30 MIN: CPT | Mod: 25 | Performed by: PHYSICIAN ASSISTANT

## 2023-06-29 PROCEDURE — 80061 LIPID PANEL: CPT | Performed by: PHYSICIAN ASSISTANT

## 2023-06-29 PROCEDURE — 36415 COLL VENOUS BLD VENIPUNCTURE: CPT | Performed by: PHYSICIAN ASSISTANT

## 2023-06-29 PROCEDURE — 99396 PREV VISIT EST AGE 40-64: CPT | Mod: 25 | Performed by: PHYSICIAN ASSISTANT

## 2023-06-29 PROCEDURE — 93000 ELECTROCARDIOGRAM COMPLETE: CPT | Performed by: PHYSICIAN ASSISTANT

## 2023-06-29 PROCEDURE — 0121A COVID-19 BIVALENT 12+ (PFIZER): CPT | Performed by: PHYSICIAN ASSISTANT

## 2023-06-29 PROCEDURE — 71046 X-RAY EXAM CHEST 2 VIEWS: CPT | Mod: TC | Performed by: RADIOLOGY

## 2023-06-29 PROCEDURE — 83036 HEMOGLOBIN GLYCOSYLATED A1C: CPT | Performed by: PHYSICIAN ASSISTANT

## 2023-06-29 PROCEDURE — 80053 COMPREHEN METABOLIC PANEL: CPT | Performed by: PHYSICIAN ASSISTANT

## 2023-06-29 PROCEDURE — 91312 COVID-19 BIVALENT 12+ (PFIZER): CPT | Performed by: PHYSICIAN ASSISTANT

## 2023-06-29 PROCEDURE — 85027 COMPLETE CBC AUTOMATED: CPT | Performed by: PHYSICIAN ASSISTANT

## 2023-06-29 RX ORDER — LOSARTAN POTASSIUM 25 MG/1
25 TABLET ORAL DAILY
Qty: 90 TABLET | Refills: 3 | Status: SHIPPED | OUTPATIENT
Start: 2023-06-29 | End: 2024-07-11

## 2023-06-29 RX ORDER — TRAZODONE HYDROCHLORIDE 100 MG/1
100 TABLET ORAL
Qty: 90 TABLET | Refills: 3 | Status: SHIPPED | OUTPATIENT
Start: 2023-06-29 | End: 2024-06-28

## 2023-06-29 RX ORDER — HYDROCHLOROTHIAZIDE 25 MG/1
25 TABLET ORAL DAILY
Qty: 90 TABLET | Refills: 3 | Status: SHIPPED | OUTPATIENT
Start: 2023-06-29 | End: 2024-06-28

## 2023-06-29 RX ORDER — RIZATRIPTAN BENZOATE 10 MG/1
10 TABLET ORAL
Qty: 18 TABLET | Refills: 1 | Status: SHIPPED | OUTPATIENT
Start: 2023-06-29 | End: 2024-08-26

## 2023-06-29 RX ORDER — BUPROPION HYDROCHLORIDE 150 MG/1
150 TABLET ORAL EVERY MORNING
Qty: 90 TABLET | Refills: 3 | Status: SHIPPED | OUTPATIENT
Start: 2023-06-29 | End: 2024-07-11

## 2023-06-29 RX ORDER — SERTRALINE HYDROCHLORIDE 100 MG/1
100 TABLET, FILM COATED ORAL DAILY
Qty: 90 TABLET | Refills: 3 | Status: SHIPPED | OUTPATIENT
Start: 2023-06-29 | End: 2024-07-11

## 2023-06-29 ASSESSMENT — ENCOUNTER SYMPTOMS
JOINT SWELLING: 0
NERVOUS/ANXIOUS: 0
MYALGIAS: 0
PARESTHESIAS: 0
NAUSEA: 0
SHORTNESS OF BREATH: 1
CONSTIPATION: 0
DYSURIA: 0
PALPITATIONS: 1
HEMATURIA: 0
ABDOMINAL PAIN: 0
SORE THROAT: 0
EYE PAIN: 1
FREQUENCY: 0
DIZZINESS: 0
HEMATOCHEZIA: 0
WEAKNESS: 0
COUGH: 0
DIARRHEA: 0
HEADACHES: 0
CHILLS: 0
BREAST MASS: 0
ARTHRALGIAS: 0
FEVER: 0
HEARTBURN: 1

## 2023-06-29 ASSESSMENT — PATIENT HEALTH QUESTIONNAIRE - PHQ9
SUM OF ALL RESPONSES TO PHQ QUESTIONS 1-9: 1
10. IF YOU CHECKED OFF ANY PROBLEMS, HOW DIFFICULT HAVE THESE PROBLEMS MADE IT FOR YOU TO DO YOUR WORK, TAKE CARE OF THINGS AT HOME, OR GET ALONG WITH OTHER PEOPLE: SOMEWHAT DIFFICULT
SUM OF ALL RESPONSES TO PHQ QUESTIONS 1-9: 1

## 2023-06-29 ASSESSMENT — PAIN SCALES - GENERAL: PAINLEVEL: NO PAIN (0)

## 2023-06-29 NOTE — PATIENT INSTRUCTIONS
To schedule your Imaging Test or Specialty Referral please call: mammogram and stress echo     [facility]   Radiology (imaging- mammogram, ultrasound, CT, MRI): [phone]  Speciality consultation [phone]  [address]  [facility] [address]    [facility]  [address] [place] [address]  [phone]  Specialty consultation [phone]  Colonoscopy [phone]     ----    Preventive Health Recommendations  Female Ages 50 - 64    Yearly exam: See your health care provider every year in order to  Review health changes.   Discuss preventive care.    Review your medicines if your doctor has prescribed any.    Get a Pap test every three years (unless you have an abnormal result and your provider advises testing more often).  If you get Pap tests with HPV test, you only need to test every 5 years, unless you have an abnormal result.   You do not need a Pap test if your uterus was removed (hysterectomy) and you have not had cancer.  You should be tested each year for STDs (sexually transmitted diseases) if you're at risk.   Have a mammogram every 1 to 2 years.  Have a colonoscopy at age 50, or have a yearly FIT test (stool test). These exams screen for colon cancer.    Have a cholesterol test every 5 years, or more often if advised.  Have a diabetes test (fasting glucose) every three years. If you are at risk for diabetes, you should have this test more often.   If you are at risk for osteoporosis (brittle bone disease), think about having a bone density scan (DEXA).    Shots: Get a flu shot each year. Get a tetanus shot every 10 years.    Nutrition:   Eat at least 5 servings of fruits and vegetables each day.  Eat whole-grain bread, whole-wheat pasta and brown rice instead of white grains and rice.  Get adequate Calcium and Vitamin D.     Lifestyle  Exercise at least 150 minutes a week (30 minutes a day, 5 days a  week). This will help you control your weight and prevent disease.  Limit alcohol to one drink per day.  No smoking.   Wear sunscreen to prevent skin cancer.   See your dentist every six months for an exam and cleaning.  See your eye doctor every 1 to 2 years.

## 2023-07-05 ENCOUNTER — MYC MEDICAL ADVICE (OUTPATIENT)
Dept: FAMILY MEDICINE | Facility: CLINIC | Age: 58
End: 2023-07-05
Payer: COMMERCIAL

## 2023-07-05 NOTE — TELEPHONE ENCOUNTER
"LMTCB, when patient calls back please give the following message from the provider-     ([employer] message also sent with this information and patient informed she can see information in this message, instead of giving us a call back)     \"Jaylen case,   Your recent test results show:      No acute findings. Heart size appears mildly enlarged. The echo portion of the stress test will evaluate this further.      Take care,  Sugar Hollingsworth PA-C\"       Santy Carlson MA on 7/5/2023 at 8:47 AM         "

## 2023-07-26 ENCOUNTER — ANCILLARY PROCEDURE (OUTPATIENT)
Dept: MAMMOGRAPHY | Facility: OTHER | Age: 58
End: 2023-07-26
Attending: PHYSICIAN ASSISTANT
Payer: COMMERCIAL

## 2023-07-26 DIAGNOSIS — Z12.31 VISIT FOR SCREENING MAMMOGRAM: ICD-10-CM

## 2023-07-26 PROCEDURE — 77067 SCR MAMMO BI INCL CAD: CPT | Mod: TC | Performed by: RADIOLOGY

## 2023-07-26 PROCEDURE — 77063 BREAST TOMOSYNTHESIS BI: CPT | Mod: TC | Performed by: RADIOLOGY

## 2023-07-26 NOTE — TELEPHONE ENCOUNTER
"Pending Prescriptions:                       Disp   Refills    lisinopril (ZESTRIL) 5 MG tablet [Pharmacy*90 tab*3        Sig: TAKE 1 TABLET BY MOUTH EVERY DAY    buPROPion (WELLBUTRIN SR) 150 MG 12 hr tab*90 tab*3        Sig: TAKE 1 TABLET BY MOUTH EVERY DAY    hydrochlorothiazide (HYDRODIURIL) 25 MG ta*90 tab*3        Sig: TAKE 1 TABLET BY MOUTH EVERY DAY    Routing refill request to provider for review/approval because:  Patient needs to be seen because it has been more than 1 year since last office visit. Patient needs to establish care with new provider.   Aside from Sherry Yepez , last provider patient saw was Dr Davis, routing to her to review.     Requested Prescriptions   Pending Prescriptions Disp Refills    lisinopril (ZESTRIL) 5 MG tablet [Pharmacy Med Name: LISINOPRIL 5 MG TABLET] 90 tablet 3     Sig: TAKE 1 TABLET BY MOUTH EVERY DAY        ACE Inhibitors (Including Combos) Protocol Failed - 3/8/2022 11:58 AM        Failed - Blood pressure under 140/90 in past 12 months       BP Readings from Last 3 Encounters:   02/17/21 132/74   03/11/20 110/70   11/08/19 102/72                 Failed - Recent (12 mo) or future (30 days) visit within the authorizing provider's specialty     Patient has had an office visit with the authorizing provider or a provider within the authorizing providers department within the previous 12 mos or has a future within next 30 days. See \"Patient Info\" tab in inbasket, or \"Choose Columns\" in Meds & Orders section of the refill encounter.              Failed - Normal serum creatinine on file in past 12 months     Recent Labs   Lab Test 02/17/21  0959 10/09/17  1158 08/09/16  0937   CR 0.93   < >  --    CREAT  --   --  0.9    < > = values in this interval not displayed.       Ok to refill medication if creatinine is low          Failed - Normal serum potassium on file in past 12 months     Recent Labs   Lab Test 02/17/21  0959   POTASSIUM 4.1               Passed - Medication is " "active on med list        Passed - Patient is age 18 or older        Passed - No active pregnancy on record        Passed - No positive pregnancy test within past 12 months           buPROPion (WELLBUTRIN SR) 150 MG 12 hr tablet [Pharmacy Med Name: BUPROPION HCL  MG TABLET] 90 tablet 3     Sig: TAKE 1 TABLET BY MOUTH EVERY DAY        SSRIs Protocol Failed - 3/8/2022 11:58 AM        Failed - PHQ-9 score less than 5 in past 6 months     Please review last PHQ-9 score.           Failed - Recent (6 mo) or future (30 days) visit within the authorizing provider's specialty     Patient had office visit in the last 6 months or has a visit in the next 30 days with authorizing provider or within the authorizing provider's specialty.  See \"Patient Info\" tab in inbasket, or \"Choose Columns\" in Meds & Orders section of the refill encounter.            Passed - Medication is Bupropion     If the medication is Bupropion (Wellbutrin), and the patient is taking for smoking cessation; OK to refill.            Passed - Medication is active on med list        Passed - Patient is age 18 or older        Passed - No active pregnancy on record        Passed - No positive pregnancy test in last 12 months           hydrochlorothiazide (HYDRODIURIL) 25 MG tablet [Pharmacy Med Name: HYDROCHLOROTHIAZIDE 25 MG TAB] 90 tablet 3     Sig: TAKE 1 TABLET BY MOUTH EVERY DAY        Diuretics (Including Combos) Protocol Failed - 3/8/2022 11:58 AM        Failed - Blood pressure under 140/90 in past 12 months       BP Readings from Last 3 Encounters:   02/17/21 132/74   03/11/20 110/70   11/08/19 102/72                 Failed - Recent (12 mo) or future (30 days) visit within the authorizing provider's specialty     Patient has had an office visit with the authorizing provider or a provider within the authorizing providers department within the previous 12 mos or has a future within next 30 days. See \"Patient Info\" tab in inbasket, or \"Choose " "Columns\" in Meds & Orders section of the refill encounter.              Failed - Normal serum creatinine on file in past 12 months       Recent Labs   Lab Test 02/17/21  0959   CR 0.93              Failed - Normal serum potassium on file in past 12 months       Recent Labs   Lab Test 02/17/21  0959   POTASSIUM 4.1                    Failed - Normal serum sodium on file in past 12 months       Recent Labs   Lab Test 02/17/21  0959                 Passed - Medication is active on med list        Passed - Patient is age 18 or older        Passed - No active pregancy on record        Passed - No positive pregnancy test in past 12 months                  " General Sunscreen Counseling: I recommended a broad spectrum sunscreen with a SPF of 30 or higher.  I explained that SPF 30 sunscreens block approximately 97 percent of the sun's harmful rays.  Sunscreens should be applied at least 15 minutes prior to expected sun exposure and then every 2 hours after that as long as sun exposure continues. If swimming or exercising sunscreen should be reapplied every 45 minutes to an hour after getting wet or sweating.  One ounce, or the equivalent of a shot glass full of sunscreen, is adequate to protect the skin not covered by a bathing suit. I also recommended a lip balm with a sunscreen as well. Sun protective clothing can be used in lieu of sunscreen but must be worn the entire time you are exposed to the sun's rays. Detail Level: Detailed Products Recommended: Cerave

## 2023-08-14 ENCOUNTER — ANCILLARY PROCEDURE (OUTPATIENT)
Dept: CARDIOLOGY | Facility: CLINIC | Age: 58
End: 2023-08-14
Attending: PHYSICIAN ASSISTANT
Payer: COMMERCIAL

## 2023-08-14 DIAGNOSIS — R06.09 DOE (DYSPNEA ON EXERTION): ICD-10-CM

## 2023-08-14 PROCEDURE — 93016 CV STRESS TEST SUPVJ ONLY: CPT | Performed by: INTERNAL MEDICINE

## 2023-08-14 PROCEDURE — 93018 CV STRESS TEST I&R ONLY: CPT | Performed by: INTERNAL MEDICINE

## 2023-08-14 PROCEDURE — 93350 STRESS TTE ONLY: CPT | Performed by: INTERNAL MEDICINE

## 2023-08-14 PROCEDURE — 93321 DOPPLER ECHO F-UP/LMTD STD: CPT | Performed by: INTERNAL MEDICINE

## 2023-08-14 PROCEDURE — 93325 DOPPLER ECHO COLOR FLOW MAPG: CPT | Performed by: INTERNAL MEDICINE

## 2023-08-14 PROCEDURE — 93017 CV STRESS TEST TRACING ONLY: CPT | Performed by: INTERNAL MEDICINE

## 2023-08-14 RX ADMIN — Medication 3 ML: at 15:31

## 2023-08-15 ENCOUNTER — TELEPHONE (OUTPATIENT)
Dept: FAMILY MEDICINE | Facility: CLINIC | Age: 58
End: 2023-08-15
Payer: COMMERCIAL

## 2023-08-15 NOTE — TELEPHONE ENCOUNTER
Patient states returning call to PCP regarding her Echocardiogram exercise stress test results.    I did view the results but no note or instructions attached via PCP so will need to wait for PCP response.  Patient phone # verified for call back;  aware PCP not in clinic today.  Lupis FRANKS RN

## 2023-08-16 NOTE — TELEPHONE ENCOUNTER
Antoinette was returning a call from Monday placed by you and ask you reach back out to her if possible regarding her ECHO stress test.    Loreto Power RN  [facility] ~ Registered Nurse  Clinic Triage ~ [place] [facility] [place]  August 16, 2023

## 2024-02-21 ENCOUNTER — OFFICE VISIT (OUTPATIENT)
Dept: FAMILY MEDICINE | Facility: CLINIC | Age: 59
End: 2024-02-21
Payer: COMMERCIAL

## 2024-02-21 ENCOUNTER — TELEPHONE (OUTPATIENT)
Dept: FAMILY MEDICINE | Facility: CLINIC | Age: 59
End: 2024-02-21

## 2024-02-21 VITALS
DIASTOLIC BLOOD PRESSURE: 80 MMHG | OXYGEN SATURATION: 97 % | HEIGHT: 66 IN | BODY MASS INDEX: 29.48 KG/M2 | WEIGHT: 183.44 LBS | TEMPERATURE: 98 F | SYSTOLIC BLOOD PRESSURE: 122 MMHG | HEART RATE: 60 BPM | RESPIRATION RATE: 18 BRPM

## 2024-02-21 DIAGNOSIS — S32.010D COMPRESSION FRACTURE OF L1 VERTEBRA WITH ROUTINE HEALING, SUBSEQUENT ENCOUNTER: Primary | ICD-10-CM

## 2024-02-21 DIAGNOSIS — S32.592D CLOSED FRACTURE OF LEFT INFERIOR PUBIC RAMUS WITH ROUTINE HEALING, SUBSEQUENT ENCOUNTER: ICD-10-CM

## 2024-02-21 DIAGNOSIS — Z09 HOSPITAL DISCHARGE FOLLOW-UP: ICD-10-CM

## 2024-02-21 DIAGNOSIS — F32.0 MILD MAJOR DEPRESSION (H): ICD-10-CM

## 2024-02-21 PROCEDURE — 99214 OFFICE O/P EST MOD 30 MIN: CPT | Performed by: FAMILY MEDICINE

## 2024-02-21 RX ORDER — METHOCARBAMOL 500 MG/1
500-1000 TABLET, FILM COATED ORAL 4 TIMES DAILY PRN
Qty: 150 TABLET | Refills: 1 | Status: SHIPPED | OUTPATIENT
Start: 2024-02-21 | End: 2024-04-05

## 2024-02-21 RX ORDER — OXYCODONE HYDROCHLORIDE 5 MG/1
2.5-5 TABLET ORAL 2 TIMES DAILY PRN
COMMUNITY
Start: 2024-02-15 | End: 2024-02-21

## 2024-02-21 RX ORDER — METHOCARBAMOL 500 MG/1
500-1000 TABLET, FILM COATED ORAL 4 TIMES DAILY PRN
COMMUNITY
Start: 2024-02-15 | End: 2024-02-21

## 2024-02-21 RX ORDER — OXYCODONE HYDROCHLORIDE 5 MG/1
2.5-5 TABLET ORAL 2 TIMES DAILY PRN
Qty: 25 TABLET | Refills: 0 | Status: SHIPPED | OUTPATIENT
Start: 2024-02-21 | End: 2024-03-15

## 2024-02-21 ASSESSMENT — PAIN SCALES - GENERAL: PAINLEVEL: SEVERE PAIN (6)

## 2024-02-21 NOTE — TELEPHONE ENCOUNTER
Pt brought disability RAJ paperwork to clinic visit on 2/21/24 with Dr. Wright. Staff faxed form to stat scanning. Original form given back to patient. A copy of the form is in the TC basket on [facility]

## 2024-02-21 NOTE — PROGRESS NOTES
"  Assessment & Plan     Compression fracture of L1 vertebra with routine healing, subsequent encounter  Closed fracture of left inferior pubic ramus with routine healing, subsequent encounter  Hospital discharge follow-up  Patient reports overall she is doing fairly well.  She is requiring assistance with going up stairs.  She needs a .  She has difficulty walking long distances or when she has to shower but has been able to find ways to manage with help.  She has been using the oxycodone 1 to 2 tablets a day depending on her activity level.  She finds good relief with the Robaxin and ibuprofen.  She is encouraged to continue with this.  Discussed with her that over time would expect less need for the oxycodone but probably continued Robaxin and ibuprofen for the time being.  Home care referral is placed.  Patient would have difficulty getting to therapies at this time because of difficulty with walking long distances and she requires assistance to go outside of the home.  She did leave a release of information for her short-term disability but no paperwork to complete at this point.  Refills of Robaxin oxycodone.  - Home Care Referral  - methocarbamol (ROBAXIN) 500 MG tablet; Take 1-2 tablets (500-1,000 mg) by mouth 4 times daily as needed for muscle spasms  - oxyCODONE (ROXICODONE) 5 MG tablet; Take 0.5-1 tablets (2.5-5 mg) by mouth 2 times daily as needed for moderate to severe pain      Mild major depression (H24)  Doing well. Well controlled. Tolerating medication.  No change in plan.            MED REC REQUIRED  Post Medication Reconciliation Status:  Discharge medications reconciled, continue medications without change  BMI  Estimated body mass index is 30.06 kg/m  as calculated from the following:    Height as of this encounter: 1.664 m (5' 5.5\").    Weight as of this encounter: 83.2 kg (183 lb 7 oz).   Weight management plan: Patient was referred to their PCP to discuss a diet and exercise " plan.          Subjective   Antoinette is a 58 year old, presenting for the following health issues:  Hospital F/U      2/21/2024     2:21 PM   Additional Questions   Roomed by VE   Accompanied by Spouse   Failed to redirect to the Timeline version of the REVFS SmartLink.      2/21/2024     2:21 PM   Patient Reported Additional Medications   Patient reports taking the following new medications Robaxin 500mg, Oxycodone 5mg     HPI       Answers submitted by the patient for this visit:  Patient Health Questionnaire (Submitted on 2/21/2024)  If you checked off any problems, how difficult have these problems made it for you to do your work, take care of things at home, or get along with other people?: Not difficult at all  PHQ9 TOTAL SCORE: 0          Hospital Follow-up Visit:    Hospital/Nursing Home/ Rehab Facility:  [facility]  Date of Admission: 2/12/2024  Date of Discharge: 2/15/2024  Reason(s) for Admission: Closed compression fracture of body of L1 vertebra (HC) (Primary Dx);   Closed fracture of left inferior pubic ramus, initial encounter (HC);   Closed fracture of first lumbar vertebra, unspecified fracture morphology, initial encounter     Was your hospitalization related to COVID-19? No   Problems taking medications regularly:  None  Medication changes since discharge: Robaxin and Oxycodone  Problems adhering to non-medication therapy:  None    Summary of hospitalization:  CareEverywhere information obtained and reviewed. Discharge summary is not available yet.     Diagnostic Tests/Treatments reviewed.  Follow up needed: none    Other Healthcare Providers Involved in Patient s Care:          orthopedics     Update since discharge: improved.       For pain, is taking tylenol, ibuprofen, robaxin and oxycodone.     Currently using ibuprofen 600 mg q 6 hours with 2 robaxin.   Taking oxycodone 1-2 times per day. With Tylenol.  She is in need of refills of the Robaxin and oxycodone.  She has follow-up  "scheduled with orthopedics March 18.  She reports that they were unable to get her set up for home care in the hospital and told her to have this scheduled when she is seen in the office.    She is currently taking Dulcolax and has used suppositories due to constipation from pain medication.  She feels that it is under better control at this point.  She is not having difficulty with controlling her bowels or her bladder.  She does feel it is a little more difficult to initiate a bowel movement but is still able to do so.    Plan of care communicated with patient           Depression Followup  How are you doing with your depression since your last visit?  Patient reports she is overall doing well with this currently.  She reports she has a lot of support.  Are you having other symptoms that might be associated with depression? No  Have you had a significant life event?  OTHER: Recent injury    Are you feeling anxious or having panic attacks?   No    Social History     Tobacco Use    Smoking status: Never     Passive exposure: Never    Smokeless tobacco: Never   Vaping Use    Vaping Use: Never used   Substance Use Topics    Alcohol use: Yes     Alcohol/week: 5.0 standard drinks of alcohol     Types: 5 Standard drinks or equivalent per week     Comment: july    Drug use: No         5/26/2022     9:03 AM 6/29/2023    10:40 AM 2/21/2024     2:13 PM   PHQ   PHQ-9 Total Score 1 1 0   Q9: Thoughts of better off dead/self-harm past 2 weeks Not at all Not at all Not at all         6/21/2019     1:13 PM 9/13/2019     1:15 PM 2/17/2021     8:56 AM   LESLIE-7 SCORE   Total Score 2 (minimal anxiety) 0 (minimal anxiety) 0 (minimal anxiety)   Total Score 2 0 0         Suicide Assessment Five-step Evaluation and Treatment (SAFE-T)                Objective    /80 (BP Location: Left arm, Patient Position: Chair, Cuff Size: Adult Regular)   Pulse 60   Temp 98  F (36.7  C) (Temporal)   Resp 18   Ht 1.664 m (5' 5.5\")   Wt " 83.2 kg (183 lb 7 oz)   LMP 07/01/2005 (Exact Date)   SpO2 97%   Breastfeeding No   BMI 30.06 kg/m    Body mass index is 30.06 kg/m .  Physical Exam     GENERAL: alert and no distress.  Patient is wearing brace.  HENT: Throat is clear mucosa membranes are moist  NECK: no adenopathy, no asymmetry, masses, or scars  RESP: lungs clear to auscultation - no rales, rhonchi or wheezes  CV: regular rate and rhythm, normal S1 S2, no S3 or S4, no murmur, click or rub, no peripheral edema  MS: no gross musculoskeletal defects noted, no edema  PSYCH: mentation appears normal, affect normal/bright            Signed Electronically by: Idania Wright MD

## 2024-02-23 ENCOUNTER — TELEPHONE (OUTPATIENT)
Dept: FAMILY MEDICINE | Facility: CLINIC | Age: 59
End: 2024-02-23
Payer: COMMERCIAL

## 2024-02-23 NOTE — TELEPHONE ENCOUNTER
Forms/Letter Request    Type of form/letter: FMLA - Intermittent    Number of days per episode:  begin 2/12/2024 and on 2/28/2024  Number of episodes per month:      Do we have the form/letter: Yes    Who is the form from? Insurance comp    Where did/will the form come from? form was faxed in    When is form/letter needed by: at your earliest time     How would you like the form/letter returned: Fax : [phone]    Patient Notified form requests are processed in 5-7 business days:No    Could we send this information to you in [employer] or would you prefer to receive a phone call?:   Patient would like to be contacted via [employer]

## 2024-02-28 ENCOUNTER — TELEPHONE (OUTPATIENT)
Dept: FAMILY MEDICINE | Facility: CLINIC | Age: 59
End: 2024-02-28
Payer: COMMERCIAL
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
## 2024-02-28 DIAGNOSIS — S32.010D COMPRESSION FRACTURE OF L1 VERTEBRA WITH ROUTINE HEALING, SUBSEQUENT ENCOUNTER: Primary | ICD-10-CM

## 2024-02-28 DIAGNOSIS — S32.592D CLOSED FRACTURE OF LEFT INFERIOR PUBIC RAMUS WITH ROUTINE HEALING, SUBSEQUENT ENCOUNTER: ICD-10-CM

## 2024-02-28 NOTE — TELEPHONE ENCOUNTER
Patient stated when she was released from [facility] they requested her to F/U with Neurology, It is out of network for her, for spinal xrays.    She is requesting neurology referral for follow up   Xrays to be in network. No new changes or pain.    Ana Vargas RN  [facility] - Registered Nurse  Clinic Triage [place]   February 28, 2024

## 2024-02-28 NOTE — TELEPHONE ENCOUNTER
Please reach out as to why as I don't see this was discussed. Wondering if there is a new issue or if for the same reason seen recently.

## 2024-02-28 NOTE — TELEPHONE ENCOUNTER
RN Triage    Patient Contact    Attempt # 1    Was call answered?  No.  Left message on voicemail with information to call me back.    Upon call back please relay below message  Please reach out as to why as I don't see this was discussed. Wondering if there is a new issue or if for the same reason seen recently.    Regarding pt. Request for Neurologist    Ana Vargas RN  [facility] - Registered Nurse  Clinic Triage Christina   February 28, 2024

## 2024-02-29 NOTE — TELEPHONE ENCOUNTER
Looked through notes from the hospitalization. Looks like neurosurgery was requested. Placed referral. Should receive a call.

## 2024-03-05 NOTE — TELEPHONE ENCOUNTER
Patient calling in that the FMLA (short term disability form) had an incorrect end date on the form and question 4A needs to be fixed with an end date of 3/18/24. That is when patient will she ortho and neuro by.     It was faxed to clinic by [facility].     Nakia Pak RN on 3/5/2024 at 10:02 AM

## 2024-03-05 NOTE — TELEPHONE ENCOUNTER
Placed copy of form in providers basket and highlighted the question that needs changing (if new date is appropriate). Please initial the changes if you make any.   Traci Pena (wife)

## 2024-03-11 ENCOUNTER — TELEPHONE (OUTPATIENT)
Dept: FAMILY MEDICINE | Facility: CLINIC | Age: 59
End: 2024-03-11
Payer: COMMERCIAL

## 2024-03-13 ENCOUNTER — TELEPHONE (OUTPATIENT)
Dept: NEUROSURGERY | Facility: CLINIC | Age: 59
End: 2024-03-13
Payer: COMMERCIAL

## 2024-03-13 ENCOUNTER — TELEPHONE (OUTPATIENT)
Dept: FAMILY MEDICINE | Facility: CLINIC | Age: 59
End: 2024-03-13
Payer: COMMERCIAL

## 2024-03-13 NOTE — TELEPHONE ENCOUNTER
Iliana Moran routed conversation to [facility] [place]3 hours ago (12:04 PM)     Iliana Moran3 hours ago (12:04 PM)     TZ     Forms/Letter Request     Type of form/letter:  Short Term Disability     Do we have the form/letter: Yes:       Who is the form from? Insurance comp [employer]     Where did/will the form come from? form was faxed in     When is form/letter needed by: March 18      How would you like the form/letter returned: Fax : to:  [phone]     Patient Notified form requests are processed in 5-7 business days:No     Could we send this information to you in [employer] or would you prefer to receive a phone call?:   Patient would like to be contacted via [employer]

## 2024-03-13 NOTE — CONFIDENTIAL NOTE
Forms/Letter Request    Type of form/letter:  Short Term Disability    Do we have the form/letter: Yes:      Who is the form from? Insurance comp [facility] [employer]    Where did/will the form come from? form was faxed in    When is form/letter needed by: March 18     How would you like the form/letter returned: Fax : to:  [phone]    Patient Notified form requests are processed in 5-7 business days:No    Could we send this information to you in [employer] or would you prefer to receive a phone call?:   Patient would like to be contacted via [email or web address]

## 2024-03-13 NOTE — TELEPHONE ENCOUNTER
This is a new form. I added visit notes as requested on form.     Placed form in provider basket    * Due: 3-5 Business Days

## 2024-03-13 NOTE — TELEPHONE ENCOUNTER
Left Voicemail (1st Attempt) for the patient to call back and schedule the following:    Appointment type: NEW [employer] SPINE YESI  Provider: GELA ROCK  Return date: N/A  Specialty phone number: [phone]  Additional appointment(s) needed: N/A  Additonal Notes: Dx: Compression fracture of L1 vertebra with routine healing, subsequent encounter; Closed fracture of left inferior pubic ramus with routine healing, subsequent; Fall from ladder, subsequent encounter. -KB

## 2024-03-14 NOTE — TELEPHONE ENCOUNTER
SPINE PATIENTS - NEW PROTOCOL PREVISIT    RECORDS RECEIVED FROM: Care Everywhere   REASON FOR VISIT: Compression fracture of L1 vertebra with routine healing, subsequent encounter/ Closed fracture of left inferior pubic ramus with routine healing, subsequent/ Fall from ladder   PROVIDER: Marie Mtz PA-C   DATE OF APPT: 3/20/24 @ 10:00 am    NOTES (FOR ALL VISITS) STATUS DETAILS   OFFICE NOTE from referring provider Internal 2/21/24 Idania Wright MD @[facility]     DISCHARGE SUMMARY from hospital Care Everywhere 2/12/24-2/15/24 Mariano Guaman MD  @[facility]     MEDICATION LIST Internal    IMAGING  (FOR ALL VISITS)     XRAY (SPINE) *NEUROSURGERY* Received [facility]  2/14/24 XR Thoracolumbar Spine  2/12/24 XR Pelvis & Hip     CT (HEAD, NECK, SPINE) Received [facility]  2/12/24 CT Lumbar Spine  2/12/24 CT Thoracic Spine  2/12/24 CT Cervical Spine

## 2024-03-15 ENCOUNTER — TELEPHONE (OUTPATIENT)
Dept: FAMILY MEDICINE | Facility: CLINIC | Age: 59
End: 2024-03-15
Payer: COMMERCIAL

## 2024-03-15 DIAGNOSIS — S32.592D CLOSED FRACTURE OF LEFT INFERIOR PUBIC RAMUS WITH ROUTINE HEALING, SUBSEQUENT ENCOUNTER: ICD-10-CM

## 2024-03-15 DIAGNOSIS — S32.010D COMPRESSION FRACTURE OF L1 VERTEBRA WITH ROUTINE HEALING, SUBSEQUENT ENCOUNTER: ICD-10-CM

## 2024-03-15 RX ORDER — OXYCODONE HYDROCHLORIDE 5 MG/1
2.5-5 TABLET ORAL 2 TIMES DAILY PRN
Qty: 11 TABLET | Refills: 0 | Status: SHIPPED | OUTPATIENT
Start: 2024-03-15 | End: 2024-04-08

## 2024-03-15 NOTE — TELEPHONE ENCOUNTER
Patient called requesting for a RX for the rest of her oxycodone. Received 14 tablets from pharmacy due to them not having it in stock.    New RX oxycodone 11 tablets    Ana Vargas RN  [facility] - Registered Nurse  Clinic Triage [place]   March 15, 2024

## 2024-03-20 ENCOUNTER — LAB (OUTPATIENT)
Dept: LAB | Facility: CLINIC | Age: 59
End: 2024-03-20
Payer: COMMERCIAL

## 2024-03-20 ENCOUNTER — ANCILLARY PROCEDURE (OUTPATIENT)
Dept: GENERAL RADIOLOGY | Facility: CLINIC | Age: 59
End: 2024-03-20
Attending: PHYSICIAN ASSISTANT
Payer: COMMERCIAL

## 2024-03-20 ENCOUNTER — PRE VISIT (OUTPATIENT)
Dept: NEUROSURGERY | Facility: CLINIC | Age: 59
End: 2024-03-20

## 2024-03-20 ENCOUNTER — OFFICE VISIT (OUTPATIENT)
Dept: NEUROSURGERY | Facility: CLINIC | Age: 59
End: 2024-03-20
Attending: FAMILY MEDICINE
Payer: COMMERCIAL

## 2024-03-20 VITALS
SYSTOLIC BLOOD PRESSURE: 129 MMHG | HEART RATE: 55 BPM | WEIGHT: 185 LBS | OXYGEN SATURATION: 93 % | RESPIRATION RATE: 16 BRPM | HEIGHT: 66 IN | BODY MASS INDEX: 29.73 KG/M2 | DIASTOLIC BLOOD PRESSURE: 83 MMHG

## 2024-03-20 DIAGNOSIS — S32.010D COMPRESSION FRACTURE OF L1 VERTEBRA WITH ROUTINE HEALING, SUBSEQUENT ENCOUNTER: ICD-10-CM

## 2024-03-20 DIAGNOSIS — S32.011D CLOSED STABLE BURST FRACTURE OF FIRST LUMBAR VERTEBRA WITH ROUTINE HEALING, SUBSEQUENT ENCOUNTER: ICD-10-CM

## 2024-03-20 DIAGNOSIS — S32.001S BURST FRACTURE OF LUMBAR VERTEBRA, SEQUELA: Primary | ICD-10-CM
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
## 2024-03-20 DIAGNOSIS — S32.592D CLOSED FRACTURE OF LEFT INFERIOR PUBIC RAMUS WITH ROUTINE HEALING, SUBSEQUENT ENCOUNTER: ICD-10-CM

## 2024-03-20 DIAGNOSIS — M81.0 AGE RELATED OSTEOPOROSIS, UNSPECIFIED PATHOLOGICAL FRACTURE PRESENCE: ICD-10-CM

## 2024-03-20 DIAGNOSIS — S32.001S BURST FRACTURE OF LUMBAR VERTEBRA, SEQUELA: ICD-10-CM

## 2024-03-20 DIAGNOSIS — S32.011D CLOSED STABLE BURST FRACTURE OF FIRST LUMBAR VERTEBRA WITH ROUTINE HEALING, SUBSEQUENT ENCOUNTER: Primary | ICD-10-CM

## 2024-03-20 LAB — RADIOLOGIST FLAGS: ABNORMAL

## 2024-03-20 PROCEDURE — 82306 VITAMIN D 25 HYDROXY: CPT | Performed by: PHYSICIAN ASSISTANT

## 2024-03-20 PROCEDURE — 99000 SPECIMEN HANDLING OFFICE-LAB: CPT | Performed by: PATHOLOGY

## 2024-03-20 PROCEDURE — 77073 BONE LENGTH STUDIES: CPT | Performed by: STUDENT IN AN ORGANIZED HEALTH CARE EDUCATION/TRAINING PROGRAM

## 2024-03-20 PROCEDURE — 36415 COLL VENOUS BLD VENIPUNCTURE: CPT | Performed by: PATHOLOGY

## 2024-03-20 PROCEDURE — 99204 OFFICE O/P NEW MOD 45 MIN: CPT | Performed by: PHYSICIAN ASSISTANT

## 2024-03-20 PROCEDURE — 72082 X-RAY EXAM ENTIRE SPI 2/3 VW: CPT | Performed by: STUDENT IN AN ORGANIZED HEALTH CARE EDUCATION/TRAINING PROGRAM

## 2024-03-20 ASSESSMENT — PAIN SCALES - GENERAL: PAINLEVEL: MODERATE PAIN (4)

## 2024-03-20 NOTE — LETTER
3/20/2024       RE: Antoinette Sherwood  [address] [id number]     Dear Colleague,    Thank you for referring your patient, Antoinette Sherwood, to the [facility]. Please see a copy of my visit note below.        Neurosurgery Clinic Note    It was a pleasure to evaluate Antoinette Sherwood in clinic today at the kind referral of   Idania Wright MD  [address]  [place] [id number].    ASSESSMENT:  Antoinette Sherwood is a 58 year old female who is following up for L1 burst fracture.  Her fracture has increased from her initial ED visit mid February.  There is increased kyphosis and retropulsion as well as height loss greatest anteriorly.  Opportunistic measure of her bone density is HU 93 at the L2 vertebral body.  This is consistent with osteoporosis.  We discussed that we will continue to have her wear the TLSO brace for at least another 6 to 8 weeks.  She may require surgery, but will need to have her bone health evaluated and treated prior to that.    PLAN:    Referral to bone health  DEXA scan and vitamin D lab prior to that appointment    Follow-up 6 weeks with EOS x-ray with neurosurgery.  Continue to wear TLSO brace at all times when out of bed and when head of bed is greater than 45 degrees.  No lifting, pushing, pulling greater than 10 pounds   no bending or twisting activities  Patient should remain out of work until cleared by neurosurgery given the duties of her job.  She does have paperwork that needs to be filled out for this.  She has been directed to submit that to Nuria Olsen.    History of Present Illness:    Antoinette Sherwood is a 58 year old female who is presenting with a chief complaint of  L1 burst type fracture.    Per chart - patient presented to [facility] ED 2/12/24 (through 2/15/24) after fall off ladder 6 feet when painting and landed on  her feet and left side.  She endorses low back and left groin pain after the fall.  She was found to have L1 compression/burst fracture and left pubic ramus fracture.  Bones osteopenic.  She was neuro intact.  She was provided a custom made TLSO brace to wear for 3 mo.     Patient presents today for hospital follow-up with her .  She is wearing the TLSO brace.  She continues to have moderate to severe back pain in the area of the fracture with some extension into her right buttock area.  She denies pain radiating down her legs or into her groin.  Her pain at rest right now is 4 out of 10, in the morning it is the worst reaching 8 out of 10.  Patient is needing to use baclofen and ibuprofen 2 times per day, and she uses oxycodone for breakthrough pain.  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  She has not been back to work since her fall.  Patient denies having known about any bone density issues in the past.  She has not had prior DEXA scan.  She does take vitamin D and calcium but has not had any bisphosphonates or other bone health medications.  She has 3 children all of whom she has breast-fed.      IMAGING   Imaging independently reviewed.    EOS XR 3/20/24 -L1 burst fracture has progressed significantly, anterior portion is almost completely collapsed, some increased posterior retropulsion.  Focal kyphosis.     Findings:     12 rib bearing vertebral bodies and 5 lumbar type vertebral bodies are  identified.     Progression of the L1 superior endplate compression deformity now with  approximately 75% vertebral body height loss, previously approximately  50% height loss on 2/14/2024.     Coronal Deformity:     Mild convex left curvature of the thoracic spine with the apex at  approximately T5.     No substantial global coronal imbalance.     Sagittal Vertical Axis (A vertical line drawn from the center of C7  (matty line) to the posterosuperior aspect  of the S1 on sagittal  plane):  less than 4 cm      Weight bearing axis: (Defined as a line drawn from the center of the  femoral head to the mid aspect of the tibial plafond).         Right: Weight bearing axis crosses through the lateral tibial  spine.          Left: Weight bearing axis crosses through the medial tibial  spine.     Leg length:  (Measured from the top of the femoral head to the center  of tibial plafond.  It is assumed joints are in similar degrees of  extension bilaterally.  Significant difference is defined when  discrepancy is greater than 1.5 cm).           No significant  leg length discrepancy.     Additional Findings:     Borderline enlarged cardiac silhouette. The lungs are clear.  Nonobstructive bowel gas pattern. Cholecystectomy clips.  Impression:  Progression of the L1 superior endplate compression deformity now with  approximately 75% vertebral body height loss.       XR Thoracolumbar Spine Port 2 Views  Result Date: 2/14/2024  For Patients: As a result of the 21st Century Cures Act, medical imaging exams and procedure reports are released immediately into your electronic medical record. You may view this report before your referring provider. If you have questions, please contact your health care provider. EXAM: XR SPINE THORACOLUMBAR JUNCTION MINIMUM OF 2 VIEWS LOCATION: [facility] DATE: 2/14/2024 INDICATION: Eval or F/U FX COMPARISON: Lumbar CT 02/12/2024  L1 upper endplate fracture with 50% height loss. Interval progression since the previous CT with about 25% height loss. No new fractures at other levels. Kyphosis centered at the L1 level. Mild-to-moderate degenerative disc changes at T12-L1 and L1-L2. Bones mildly osteopenic.    CT CHEST ABDOMEN PELVIS W  Result Date: 2/12/2024  For Patients: As a result of the 21st Century Cures Act, medical imaging exams and procedure reports are released immediately into your electronic medical record. You may view this report before your  referring provider. If you have questions, please contact your health care provider. EXAM: CT CHEST ABDOMEN PELVIS W LOCATION: [facility] DATE: 2/12/2024 INDICATION: Trauma. Pain. COMPARISON: None. TECHNIQUE: CT scan of the chest, abdomen, and pelvis was performed following injection of IV contrast. Multiplanar reformats were obtained. Dose reduction techniques were used. CONTRAST: Omnipaque 350 100 FINDINGS: LUNGS AND PLEURA: Mild atelectasis in the dependent portion of both lungs. Calcified granulomas left lower lobe. Small amount of interstitial and groundglass change in the right perihilar region may represent pneumonitis. No consolidation. No pleural fluid. MEDIASTINUM/AXILLAE: No lymphadenopathy. No pericardial effusion. Moderate-sized esophageal hiatal hernia. CORONARY ARTERY CALCIFICATION: Mild. HEPATOBILIARY: Cholecystectomy. Mild fatty infiltration of the liver. PANCREAS: Normal. SPLEEN: Normal. ADRENAL GLANDS: Normal. KIDNEYS/BLADDER: Subcentimeter angiomyolipoma upper pole right kidney. Subcentimeter cortical cyst right kidney requires no additional follow-up. The left kidney demonstrates a 6 x 3 mm nonobstructing stone. The urinary bladder is grossly unremarkable. BOWEL: Diverticular disease of the colon without evidence for acute diverticulitis. LYMPH NODES: Normal. VASCULATURE: Unremarkable. PELVIC ORGANS: Prior hysterectomy. No adnexal lesions or free fluid. MUSCULOSKELETAL: Compression fracture deformity of the L1 vertebral body. There is loss of approximately 15% of the vertebral height. No subluxation.  1.  Mild compression fracture deformity of the L1 vertebral body. No other fractures or hematoma. 2.  Interstitial and groundglass change in the right perihilar region may be due to small amount of pneumonitis. No consolidation. 3.  Cholecystectomy and hysterectomy. 4.  Diverticular disease of the colon without diverticulitis. 5.  Small angiomyolipoma right kidney. No follow-up indicated. 6.   Nonobstructing stone lower pole left kidney. 7.  Esophageal hiatal hernia.    CT Thoracic, Lumbar Spine Reconstructed  Result Date: 2/12/2024  EXAM: CT HEAD BRAIN WO, CT LUMBAR SPINE 2D RECONSTRUCTION, CT THORACIC SPINE 2D RECONSTRUCTION, CT SPINE CERVICAL WO LOCATION: [facility] DATE: 2/12/2024 INDICATION: Trauma, head, neck and back injury. COMPARISON: None. TECHNIQUE: 1) Routine CT Head without IV contrast. Multiplanar reformats. Dose reduction techniques were used. 2) Routine CT Cervical Spine without IV contrast. Multiplanar reformats. Dose reduction techniques were used. 3) Routine CT Thoracic Spine without IV contrast. Multiplanar reformats. Dose reduction techniques were used. 4) Routine CT Lumbar Spine without IV contrast. Multiplanar reformats. Dose reduction techniques were used. FINDINGS: HEAD CT: INTRACRANIAL CONTENTS: No intracranial hemorrhage, extraaxial collection, or mass effect.  No CT evidence of acute infarct. Mild presumed chronic small vessel ischemic changes. Mild generalized volume loss. No hydrocephalus. VISUALIZED ORBITS/SINUSES/MASTOIDS: No intraorbital abnormality. No paranasal sinus mucosal disease. No middle ear or mastoid effusion. BONES/SOFT TISSUES: No acute abnormality. CERVICAL SPINE CT: VERTEBRA: Cervical vertebra are normal in height. There is nonspecific straightening of the expected lordosis but the alignment is grossly normal. No fracture or posttraumatic subluxation. CANAL/FORAMINA: No canal or neural foraminal stenosis. PARASPINAL: No extraspinal abnormality. Visualized lung fields are clear. THORACIC SPINE CT: VERTEBRA: Normal vertebral body heights and alignment. No fracture or posttraumatic subluxation. Mild S-type curvature of the spine. CANAL/FORAMINA: No canal or neural foraminal stenosis. PARASPINAL: Please refer to the report of the CT chest, abdomen, and pelvis. LUMBAR SPINE CT: VERTEBRA: Mild to moderate recent appearing burst compression fracture at the L1  "level with approximately 25% maximal height loss. Minimal buckling of the posterior cortex into the ventral epidural space. Additional lumbar vertebra are grossly normal in height and alignment. CANAL/FORAMINA: No canal or neural foraminal stenosis. PARASPINAL: Please refer to the report of the CT chest, abdomen, and pelvis.  HEAD CT: 1.  No acute intracranial hemorrhage or calvarial fracture. CERVICAL SPINE CT: 1.  No fracture or posttraumatic subluxation. 2.  No high-grade spinal canal or neural foraminal stenosis. THORACIC SPINE CT: 1.  No fracture or posttraumatic subluxation. 2.  No high-grade spinal canal or neural foraminal stenosis. LUMBAR SPINE CT: 1.  Mild to moderate acute or subacute burst type compression fracture at L1 with approximately 25% maximal height loss. Minimal buckling of the posterior cortex into the ventral epidural space without significant central canal stenosis. NOTE: ABNORMAL REPORT THE DICTATION ABOVE DESCRIBES AN ABNORMALITY FOR WHICH FOLLOW-UP IS NEEDED.     XR Pelvis and Hip Left 1 View  Result Date: 2/12/2024  For Patients: As a result of the 21st Century Cures Act, medical imaging exams and procedure reports are released immediately into your electronic medical record. You may view this report before your referring provider. If you have questions, please contact your health care provider. EXAM: XR HIP 1 VIEW W PELVIS LEFT LOCATION: [facility] DATE: 2/12/2024 INDICATION: free text)->fall, pain COMPARISON: Same-day CT  Normal joint spaces and alignment. Nondisplaced fracture of the left inferior pubic ramus, better characterized on same day CT. Contrast opacifies the visualized ureters and urinary bladder. NOTE: ABNORMAL REPORT THE DICTATION ABOVE DESCRIBES AN ABNORMALITY FOR WHICH FOLLOW-UP IS NEEDED.      Physical Exam   /83 (BP Location: Right arm, Patient Position: Sitting)   Pulse 55   Resp 16   Ht 1.676 m (5' 6\")   Wt 83.9 kg (185 lb)   LMP 07/01/2005 (Exact " Date)   SpO2 93%   BMI 29.86 kg/m      Constitutional: Appears well-developed and well-nourished. Cooperative. No acute distress.   HENT:   Head: Normocephalic and atraumatic.   Eyes: Conjunctivae are normal.  Neck: Neck supple. No tracheal deviation present.  Cardiovascular: Normal rate and regular rhythm.    Pulmonary/Chest: Effort normal and breath sounds normal.  Abdominal: Exhibits no obvious distension.   Musculoskeletal: Able to move all extremities.  No involuntary motor movements.   Skin: Skin is warm, dry and intact.   Psychiatric: Normal mood and affect. Speech is normal and behavior is normal.    Neurological:  Alert, NAD, and oriented to person, place, and time.   No cranial nerve deficit   Wearing TLSO brace appropriately.  No assistive devices for ambulation.    CONTRERAS equally well.          Review of Systems   See HPI     Past Medical History:   Diagnosis Date    Anxiety     Calculus of kidney     Abstracted 07/01/02    Hypertension        Past Surgical History:   Procedure Laterality Date    CL AFF SURGICAL PATHOLOGY  2006    breast reduction    EXCISE LESION TRUNK  11/21/2012    Procedure: EXCISE LESION TRUNK;  Excision back mass;  Surgeon: Saqib Abraham MD;  Location: [facility] OR    HC CORRECT BUNION,METATARSAL OSTEOTOMY  12/03/09    right great toe    HC EXCISION LESION/TENDON-SHEATH/CAPSULE, FOOT  12/03/09    HC REMV TARSAL/METATARSAL BENIGN BONE LESN  12/03/09    HYSTERECTOMY, PAP NO LONGER INDICATED      HYSTERECTOMY, VAGINAL  2005    menorrhagia, normal paps    LAPAROSCOPIC CHOLECYSTECTOMY  2001    LITHOTRIPSY  2007    right kidney    TUBAL LIGATION  2001    RUST NONSPECIFIC PROCEDURE  08/96    Left salpingectomy   Abstracted 07/01/02       Social History     Socioeconomic History    Marital status:    Tobacco Use    Smoking status: Never     Passive exposure: Never    Smokeless tobacco: Never   Vaping Use    Vaping Use: Never used   Substance and Sexual Activity    Alcohol use: Yes      Alcohol/week: 5.0 standard drinks of alcohol     Types: 5 Standard drinks or equivalent per week     Comment: july    Drug use: No    Sexual activity: Yes     Partners: Male     Birth control/protection: Surgical       family history includes Colon Cancer in her father; Diabetes in her mother; Eye Disorder in her mother; Genitourinary Problems in her mother; Hypertension in her father; LUNG DISEASE in her mother; Lipids in her father; Thyroid Disease in her father.         I spent 46 minutes spent in patient care, independent review and interpretation of medical records/imaging, reviewing old records.        Again, thank you for allowing me to participate in the care of your patient.      Sincerely,    Marie Mtz PA-C

## 2024-03-20 NOTE — PROGRESS NOTES
Neurosurgery Clinic Note    It was a pleasure to evaluate Antoinette Sherwood in clinic today at the kind referral of   Idania Wright MD  [address] [place] [address].    ASSESSMENT:  Antoinette Sherwood is a 58 year old female who is following up for L1 burst fracture.  Her fracture has increased from her initial ED visit mid February.  There is increased kyphosis and retropulsion as well as height loss greatest anteriorly.  Opportunistic measure of her bone density is HU 93 at the L2 vertebral body.  This is consistent with osteoporosis.  We discussed that we will continue to have her wear the TLSO brace for at least another 6 to 8 weeks.  She may require surgery, but will need to have her bone health evaluated and treated prior to that.    PLAN:    Referral to bone health  DEXA scan and vitamin D lab prior to that appointment    Follow-up 6 weeks with EOS x-ray with neurosurgery.  Continue to wear TLSO brace at all times when out of bed and when head of bed is greater than 45 degrees.  No lifting, pushing, pulling greater than 10 pounds   no bending or twisting activities  Patient should remain out of work until cleared by neurosurgery given the duties of her job.  She does have paperwork that needs to be filled out for this.  She has been directed to submit that to Nuria Olsen.    History of Present Illness:    Antoinette Sherwood is a 58 year old female who is presenting with a chief complaint of  L1 burst type fracture.    Per chart - patient presented to [facility] ED 2/12/24 (through 2/15/24) after fall off ladder 6 feet when painting and landed on her feet and left side.  She endorses low back and left groin pain after the fall.  She was found to have L1 compression/burst fracture and left pubic ramus fracture.  Bones osteopenic.  She was neuro intact.  She was provided a custom made TLSO brace to wear for 3 mo.     Patient presents today for hospital follow-up with her .   She is wearing the TLSO brace.  She continues to have moderate to severe back pain in the area of the fracture with some extension into her right buttock area.  She denies pain radiating down her legs or into her groin.  Her pain at rest right now is 4 out of 10, in the morning it is the worst reaching 8 out of 10.  Patient is needing to use baclofen and ibuprofen 2 times per day, and she uses oxycodone for breakthrough pain.  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  She has not been back to work since her fall.  Patient denies having known about any bone density issues in the past.  She has not had prior DEXA scan.  She does take vitamin D and calcium but has not had any bisphosphonates or other bone health medications.  She has 3 children all of whom she has breast-fed.      IMAGING   Imaging independently reviewed.    EOS XR 3/20/24 -L1 burst fracture has progressed significantly, anterior portion is almost completely collapsed, some increased posterior retropulsion.  Focal kyphosis.     Findings:     12 rib bearing vertebral bodies and 5 lumbar type vertebral bodies are  identified.     Progression of the L1 superior endplate compression deformity now with  approximately 75% vertebral body height loss, previously approximately  50% height loss on 2/14/2024.     Coronal Deformity:     Mild convex left curvature of the thoracic spine with the apex at  approximately T5.     No substantial global coronal imbalance.     Sagittal Vertical Axis (A vertical line drawn from the center of C7  (matty line) to the posterosuperior aspect of the S1 on sagittal  plane):  less than 4 cm      Weight bearing axis: (Defined as a line drawn from the center of the  femoral head to the mid aspect of the tibial plafond).         Right: Weight bearing axis crosses through the lateral tibial  spine.          Left: Weight bearing axis crosses through the medial tibial  spine.     Leg  length:  (Measured from the top of the femoral head to the center  of tibial plafond.  It is assumed joints are in similar degrees of  extension bilaterally.  Significant difference is defined when  discrepancy is greater than 1.5 cm).           No significant  leg length discrepancy.     Additional Findings:     Borderline enlarged cardiac silhouette. The lungs are clear.  Nonobstructive bowel gas pattern. Cholecystectomy clips.  Impression:  Progression of the L1 superior endplate compression deformity now with  approximately 75% vertebral body height loss.       XR Thoracolumbar Spine Port 2 Views  Result Date: 2/14/2024  For Patients: As a result of the 21st Century Cures Act, medical imaging exams and procedure reports are released immediately into your electronic medical record. You may view this report before your referring provider. If you have questions, please contact your health care provider. EXAM: XR SPINE THORACOLUMBAR JUNCTION MINIMUM OF 2 VIEWS LOCATION: [facility] DATE: 2/14/2024 INDICATION: Eval or F/U FX COMPARISON: Lumbar CT 02/12/2024  L1 upper endplate fracture with 50% height loss. Interval progression since the previous CT with about 25% height loss. No new fractures at other levels. Kyphosis centered at the L1 level. Mild-to-moderate degenerative disc changes at T12-L1 and L1-L2. Bones mildly osteopenic.    CT CHEST ABDOMEN PELVIS W  Result Date: 2/12/2024  For Patients: As a result of the 21st Century Cures Act, medical imaging exams and procedure reports are released immediately into your electronic medical record. You may view this report before your referring provider. If you have questions, please contact your health care provider. EXAM: CT CHEST ABDOMEN PELVIS W LOCATION: [facility] DATE: 2/12/2024 INDICATION: Trauma. Pain. COMPARISON: None. TECHNIQUE: CT scan of the chest, abdomen, and pelvis was performed following injection of IV contrast. Multiplanar reformats were  obtained. Dose reduction techniques were used. CONTRAST: Omnipaque [id number] 100 FINDINGS: LUNGS AND PLEURA: Mild atelectasis in the dependent portion of both lungs. Calcified granulomas left lower lobe. Small amount of interstitial and groundglass change in the right perihilar region may represent pneumonitis. No consolidation. No pleural fluid. MEDIASTINUM/AXILLAE: No lymphadenopathy. No pericardial effusion. Moderate-sized esophageal hiatal hernia. CORONARY ARTERY CALCIFICATION: Mild. HEPATOBILIARY: Cholecystectomy. Mild fatty infiltration of the liver. PANCREAS: Normal. SPLEEN: Normal. ADRENAL GLANDS: Normal. KIDNEYS/BLADDER: Subcentimeter angiomyolipoma upper pole right kidney. Subcentimeter cortical cyst right kidney requires no additional follow-up. The left kidney demonstrates a 6 x 3 mm nonobstructing stone. The urinary bladder is grossly unremarkable. BOWEL: Diverticular disease of the colon without evidence for acute diverticulitis. LYMPH NODES: Normal. VASCULATURE: Unremarkable. PELVIC ORGANS: Prior hysterectomy. No adnexal lesions or free fluid. MUSCULOSKELETAL: Compression fracture deformity of the L1 vertebral body. There is loss of approximately 15% of the vertebral height. No subluxation.  1.  Mild compression fracture deformity of the L1 vertebral body. No other fractures or hematoma. 2.  Interstitial and groundglass change in the right perihilar region may be due to small amount of pneumonitis. No consolidation. 3.  Cholecystectomy and hysterectomy. 4.  Diverticular disease of the colon without diverticulitis. 5.  Small angiomyolipoma right kidney. No follow-up indicated. 6.  Nonobstructing stone lower pole left kidney. 7.  Esophageal hiatal hernia.    CT Thoracic, Lumbar Spine Reconstructed  Result Date: 2/12/2024  EXAM: CT HEAD BRAIN WO, CT LUMBAR SPINE 2D RECONSTRUCTION, CT THORACIC SPINE 2D RECONSTRUCTION, CT SPINE CERVICAL WO LOCATION: [facility] DATE: 2/12/2024 INDICATION: Trauma, head, neck  and back injury. COMPARISON: None. TECHNIQUE: 1) Routine CT Head without IV contrast. Multiplanar reformats. Dose reduction techniques were used. 2) Routine CT Cervical Spine without IV contrast. Multiplanar reformats. Dose reduction techniques were used. 3) Routine CT Thoracic Spine without IV contrast. Multiplanar reformats. Dose reduction techniques were used. 4) Routine CT Lumbar Spine without IV contrast. Multiplanar reformats. Dose reduction techniques were used. FINDINGS: HEAD CT: INTRACRANIAL CONTENTS: No intracranial hemorrhage, extraaxial collection, or mass effect.  No CT evidence of acute infarct. Mild presumed chronic small vessel ischemic changes. Mild generalized volume loss. No hydrocephalus. VISUALIZED ORBITS/SINUSES/MASTOIDS: No intraorbital abnormality. No paranasal sinus mucosal disease. No middle ear or mastoid effusion. BONES/SOFT TISSUES: No acute abnormality. CERVICAL SPINE CT: VERTEBRA: Cervical vertebra are normal in height. There is nonspecific straightening of the expected lordosis but the alignment is grossly normal. No fracture or posttraumatic subluxation. CANAL/FORAMINA: No canal or neural foraminal stenosis. PARASPINAL: No extraspinal abnormality. Visualized lung fields are clear. THORACIC SPINE CT: VERTEBRA: Normal vertebral body heights and alignment. No fracture or posttraumatic subluxation. Mild S-type curvature of the spine. CANAL/FORAMINA: No canal or neural foraminal stenosis. PARASPINAL: Please refer to the report of the CT chest, abdomen, and pelvis. LUMBAR SPINE CT: VERTEBRA: Mild to moderate recent appearing burst compression fracture at the L1 level with approximately 25% maximal height loss. Minimal buckling of the posterior cortex into the ventral epidural space. Additional lumbar vertebra are grossly normal in height and alignment. CANAL/FORAMINA: No canal or neural foraminal stenosis. PARASPINAL: Please refer to the report of the CT chest, abdomen, and pelvis.  HEAD  "CT: 1.  No acute intracranial hemorrhage or calvarial fracture. CERVICAL SPINE CT: 1.  No fracture or posttraumatic subluxation. 2.  No high-grade spinal canal or neural foraminal stenosis. THORACIC SPINE CT: 1.  No fracture or posttraumatic subluxation. 2.  No high-grade spinal canal or neural foraminal stenosis. LUMBAR SPINE CT: 1.  Mild to moderate acute or subacute burst type compression fracture at L1 with approximately 25% maximal height loss. Minimal buckling of the posterior cortex into the ventral epidural space without significant central canal stenosis. NOTE: ABNORMAL REPORT THE DICTATION ABOVE DESCRIBES AN ABNORMALITY FOR WHICH FOLLOW-UP IS NEEDED.     XR Pelvis and Hip Left 1 View  Result Date: 2/12/2024  For Patients: As a result of the 21st Century Cures Act, medical imaging exams and procedure reports are released immediately into your electronic medical record. You may view this report before your referring provider. If you have questions, please contact your health care provider. EXAM: XR HIP 1 VIEW W PELVIS LEFT LOCATION: [facility] DATE: 2/12/2024 INDICATION: free text)->fall, pain COMPARISON: Same-day CT  Normal joint spaces and alignment. Nondisplaced fracture of the left inferior pubic ramus, better characterized on same day CT. Contrast opacifies the visualized ureters and urinary bladder. NOTE: ABNORMAL REPORT THE DICTATION ABOVE DESCRIBES AN ABNORMALITY FOR WHICH FOLLOW-UP IS NEEDED.      Physical Exam   /83 (BP Location: Right arm, Patient Position: Sitting)   Pulse 55   Resp 16   Ht 1.676 m (5' 6\")   Wt 83.9 kg (185 lb)   LMP 07/01/2005 (Exact Date)   SpO2 93%   BMI 29.86 kg/m      Constitutional: Appears well-developed and well-nourished. Cooperative. No acute distress.   HENT:   Head: Normocephalic and atraumatic.   Eyes: Conjunctivae are normal.  Neck: Neck supple. No tracheal deviation present.  Cardiovascular: Normal rate and regular rhythm.    Pulmonary/Chest: " Effort normal and breath sounds normal.  Abdominal: Exhibits no obvious distension.   Musculoskeletal: Able to move all extremities.  No involuntary motor movements.   Skin: Skin is warm, dry and intact.   Psychiatric: Normal mood and affect. Speech is normal and behavior is normal.    Neurological:  Alert, NAD, and oriented to person, place, and time.   No cranial nerve deficit   Wearing TLSO brace appropriately.  No assistive devices for ambulation.    CONTRERAS equally well.          Review of Systems   See HPI     Past Medical History:   Diagnosis Date     Anxiety      Calculus of kidney     Abstracted 07/01/02     Hypertension        Past Surgical History:   Procedure Laterality Date     CL AFF SURGICAL PATHOLOGY  2006    breast reduction     EXCISE LESION TRUNK  11/21/2012    Procedure: EXCISE LESION TRUNK;  Excision back mass;  Surgeon: Saqib Abraham MD;  Location: [facility] OR     HC CORRECT BUNION,METATARSAL OSTEOTOMY  12/03/09    right great toe     HC EXCISION LESION/TENDON-SHEATH/CAPSULE, FOOT  12/03/09     HC REMV TARSAL/METATARSAL BENIGN BONE LESN  12/03/09     HYSTERECTOMY, PAP NO LONGER INDICATED       HYSTERECTOMY, VAGINAL  2005    menorrhagia, normal paps     LAPAROSCOPIC CHOLECYSTECTOMY  2001     LITHOTRIPSY  2007    right kidney     TUBAL LIGATION  2001     ZZC NONSPECIFIC PROCEDURE  08/96    Left salpingectomy   Abstracted 07/01/02       Social History     Socioeconomic History     Marital status:    Tobacco Use     Smoking status: Never     Passive exposure: Never     Smokeless tobacco: Never   Vaping Use     Vaping Use: Never used   Substance and Sexual Activity     Alcohol use: Yes     Alcohol/week: 5.0 standard drinks of alcohol     Types: 5 Standard drinks or equivalent per week     Comment: rdtzers     Drug use: No     Sexual activity: Yes     Partners: Male     Birth control/protection: Surgical       family history includes Colon Cancer in her father; Diabetes in her mother; Eye Disorder  in her mother; Genitourinary Problems in her mother; Hypertension in her father; LUNG DISEASE in her mother; Lipids in her father; Thyroid Disease in her father.         I spent 46 minutes spent in patient care, independent review and interpretation of medical records/imaging, reviewing old records.      Marie Mtz PA-C  Department of Neurosurgery  Office: [phone]

## 2024-03-20 NOTE — PATIENT INSTRUCTIONS
L1 burst fracture has progressed since initial exam.    Continue wearing TLSO brace as you are.    No bending/twisting or lifting/pushing/pulling great than 10 pounds.    F/u in 6 weeks with EOS XR.      Referral to bone health, DEXA scan and Vitamin D lab ordered.

## 2024-03-23 LAB
DEPRECATED CALCIDIOL+CALCIFEROL SERPL-MC: <55 UG/L (ref 20–75)
VITAMIN D2 SERPL-MCNC: <5 UG/L
VITAMIN D3 SERPL-MCNC: 50 UG/L

## 2024-03-26 ENCOUNTER — ANCILLARY PROCEDURE (OUTPATIENT)
Dept: BONE DENSITY | Facility: CLINIC | Age: 59
End: 2024-03-26
Attending: PHYSICIAN ASSISTANT
Payer: COMMERCIAL

## 2024-03-26 DIAGNOSIS — S32.010D COMPRESSION FRACTURE OF L1 VERTEBRA WITH ROUTINE HEALING, SUBSEQUENT ENCOUNTER: ICD-10-CM

## 2024-03-26 DIAGNOSIS — M81.0 AGE RELATED OSTEOPOROSIS, UNSPECIFIED PATHOLOGICAL FRACTURE PRESENCE: ICD-10-CM

## 2024-03-26 DIAGNOSIS — S32.001S BURST FRACTURE OF LUMBAR VERTEBRA, SEQUELA: ICD-10-CM

## 2024-03-26 PROCEDURE — 77080 DXA BONE DENSITY AXIAL: CPT | Performed by: RADIOLOGY

## 2024-04-01 DIAGNOSIS — S32.592D CLOSED FRACTURE OF LEFT INFERIOR PUBIC RAMUS WITH ROUTINE HEALING, SUBSEQUENT ENCOUNTER: ICD-10-CM

## 2024-04-01 DIAGNOSIS — S32.010D COMPRESSION FRACTURE OF L1 VERTEBRA WITH ROUTINE HEALING, SUBSEQUENT ENCOUNTER: ICD-10-CM

## 2024-04-02 RX ORDER — OXYCODONE HYDROCHLORIDE 5 MG/1
2.5-5 TABLET ORAL 2 TIMES DAILY PRN
Qty: 11 TABLET | Refills: 0 | OUTPATIENT
Start: 2024-04-02

## 2024-04-05 RX ORDER — METHOCARBAMOL 500 MG/1
500-1000 TABLET, FILM COATED ORAL 4 TIMES DAILY PRN
Qty: 150 TABLET | Refills: 0 | Status: SHIPPED | OUTPATIENT
Start: 2024-04-05

## 2024-04-05 NOTE — TELEPHONE ENCOUNTER
Patient states she is own to one oxycodone prior to her morning shower and then is ok with advil and robaxin as needed.   Appointments in Next Year      Apr 08, 2024  2:30 PM  (Arrive by 2:10 PM)  Provider Visit with Sugar Hollingsworth PA-C  [facility] ([facility] ) [phone]       This pt is seeing neurosurgery for compression fracture. They do not prescribe narcotics.      Pt is requesting refills.      I have not seen the patient for this injury.      Is she still taking the oxycodone? How often?   I can extend a small fill but for more refills of narcotics needs visit in primary care.   Sugar Hollingsworth PA-C    Are you able to extend small refill as stated until visit 4/8/24.    Please advise      Ana Vargas RN  [facility] - Registered Nurse  Clinic Triage [place]   April 5, 2024

## 2024-04-05 NOTE — TELEPHONE ENCOUNTER
This pt is seeing neurosurgery for compression fracture. They do not prescribe narcotics.     Pt is requesting refills.     I have not seen the patient for this injury.     Is she still taking the oxycodone? How often?   I can extend a small fill but for more refills of narcotics needs visit in primary care.   Sugar Hollingsworth PA-C

## 2024-04-08 ENCOUNTER — OFFICE VISIT (OUTPATIENT)
Dept: FAMILY MEDICINE | Facility: CLINIC | Age: 59
End: 2024-04-08
Payer: COMMERCIAL

## 2024-04-08 VITALS
DIASTOLIC BLOOD PRESSURE: 84 MMHG | WEIGHT: 191.25 LBS | HEIGHT: 66 IN | TEMPERATURE: 97.4 F | SYSTOLIC BLOOD PRESSURE: 127 MMHG | BODY MASS INDEX: 30.74 KG/M2 | RESPIRATION RATE: 16 BRPM | HEART RATE: 68 BPM | OXYGEN SATURATION: 94 %

## 2024-04-08 DIAGNOSIS — S32.592D CLOSED FRACTURE OF LEFT INFERIOR PUBIC RAMUS WITH ROUTINE HEALING, SUBSEQUENT ENCOUNTER: ICD-10-CM

## 2024-04-08 DIAGNOSIS — S32.010D COMPRESSION FRACTURE OF L1 VERTEBRA WITH ROUTINE HEALING, SUBSEQUENT ENCOUNTER: Primary | ICD-10-CM

## 2024-04-08 DIAGNOSIS — I10 BENIGN HYPERTENSION: Chronic | ICD-10-CM

## 2024-04-08 DIAGNOSIS — K21.9 GASTROESOPHAGEAL REFLUX DISEASE WITHOUT ESOPHAGITIS: Chronic | ICD-10-CM

## 2024-04-08 PROCEDURE — 99214 OFFICE O/P EST MOD 30 MIN: CPT | Performed by: PHYSICIAN ASSISTANT

## 2024-04-08 RX ORDER — OXYCODONE HYDROCHLORIDE 5 MG/1
5 TABLET ORAL DAILY
Qty: 20 TABLET | Refills: 0 | Status: SHIPPED | OUTPATIENT
Start: 2024-04-08 | End: 2024-08-26

## 2024-04-08 ASSESSMENT — PAIN SCALES - GENERAL: PAINLEVEL: MODERATE PAIN (4)

## 2024-04-08 NOTE — PATIENT INSTRUCTIONS
The medication you were prescribed, is a narcotic or contains a narcotic. Narcotics can cause dizzy or drowsiness. You should not drive or operate machinery while taking this medication. Narcotics can cause constipation. If you will be taking this medication for more than a few days, add Colace (docusate sodium) 100mg twice daily to keep stools soft or Miralax 1 cap daily dissolved into a beverage of your choice.    You were prescribed a muscle relaxer. This can make you dizzy and/or drowsy.   Do NOT drink alcohol while taking.   Try for the first time at home (ie before bed) so you know how it affects you.   Do not drive while taking if you feel dizzy or drowsy.

## 2024-04-08 NOTE — PROGRESS NOTES
Assessment & Plan     Compression fracture of L1 vertebra with routine healing, subsequent encounter  Closed fracture of left inferior pubic ramus with routine healing, subsequent encounter  I exchanged messages with Marie Mtz PA-C of neurosurgery who has been managing the lumbar fracture (and who does not prescribe narcotic pain medications) - reasonable to still need /utilize oxycodone for breakthrough pain sparingly.   Refilled. Narcotics precautions discussed. Hopefully this will be enough to get her through next 4-6 weeks or so until her next follow up.   Refilled robaxin last week  Chronic nsaid precautions discussed.  Discussed osteoporosis on dexa. She reports was referred to speciality. Discussed alendronate, how to take , possible side effects. Discussed calcium intake, sounds as if she is indeed getting 1200mg daily +vit D.   - oxyCODONE (ROXICODONE) 5 MG tablet; Take 1 tablet (5 mg) by mouth daily    Benign hypertension  BP at goal on her current medications.     Gastroesophageal reflux disease without esophagitis  She is taking ibuprofen on a schedule, she is careful to take it with food and has been hydrating well. Her GERD is controlled on her OTC prilosec 20mg. Could go up on dose if started to have heartburn or GI concerns.           Follow Up: see above. Additionally patient was instructed to contact clinic for worsening symptoms, non-improvement in time frame discussed, and for questions regarding treatment plan.   For virtual visits, the patient was advised to be seen for in person evaluation if symptoms or condition are worsening or non-improvement as expected.   Sugar Hollingsworth PA-C    Estelle Curry is a 58 year old, presenting for the following health issues:  Pain ([facility] request appt pain control/Fell off ladder while painting (L1 injury) hospitalized for 4 days////Back and pelvic injury one month ago)      4/8/2024     2:09 PM   Additional Questions   Roomed by Keely Gonzalez  Schoenherr RN     Pain    History of Present Illness       Reason for visit:  Back and pelvic injury  Symptom onset:  More than a month  Symptoms include:  Pain relating to an injury  Symptom intensity:  Moderate  Symptom progression:  Improving  Had these symptoms before:  No  What makes it worse:  Activity at times  What makes it better:  Medications    She eats 2-3 servings of fruits and vegetables daily.She consumes 1 sweetened beverage(s) daily.She exercises with enough effort to increase her heart rate 9 or less minutes per day.  She exercises with enough effort to increase her heart rate 3 or less days per week.   She is taking medications regularly.     Was painting on a ladder 10 feet in the air and fell. .Landed on her feet/heels. Took by ambulance to [facility]- fractured pelvis left inferior pelvic ramus and L1 burst /compression fx.     Following with [facility] neurosurgery group- Marie Mtz PA-C.   Is in [employer] brace.   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   Neurosurgery has talked with her about surgery ultimately      They ordered a Dexa showing osteoporosis. She states neurosurg referred her to a specialist for management. She is drinking a protein shake that is high in calcium plus dietary at other meals- milk, cheese, cottage cheese. Takes a vit D also.      Robaxin 1000mg in the morning, then 500-1000mg later in the day. Basically twice daily.   Ibuprofen- 400mg - 2-3x a day every 6 hours.   Oxycodone 5mg - 1 tablet once daily. Takes before her shower in the morning- has a lot of pain with showing/dressing.      No GI upset with the ibuprofen. No heartburn. Always taking with food. Hydrating well with it. On omeprazole 20mg daily.    No dizziness or lightheadedness with muscle relaxer.   No side effects w/oxycodone. Having normal BM.           Review of Systems  Constitutional, HEENT, cardiovascular, pulmonary, gi and gu systems are negative, except as otherwise noted.      Objective   "  /84 (BP Location: Left arm, Patient Position: Sitting, Cuff Size: Adult Regular)   Pulse 68   Temp 97.4  F (36.3  C) (Oral)   Resp 16   Ht 1.676 m (5' 6\")   Wt 86.8 kg (191 lb 4 oz)   LMP 07/01/2005 (Exact Date)   SpO2 94%   BMI 30.87 kg/m    Body mass index is 30.87 kg/m .  Physical Exam   GENERAL: alert and no distress  EYES: Eyes grossly normal to inspection, PERRL and conjunctivae and sclerae normal  NECK: no adenopathy, no asymmetry, masses, or scars  Chest: she is in a hard plastic brace through torso  RESP: lungs clear to auscultation - no rales, rhonchi or wheezes  CV: regular rate and rhythm, normal S1 S2, no S3 or S4, no murmur, click or rub, no peripheral edema  MS: no edema            Signed Electronically by: Sugar Hollingsworth PA-C    "

## 2024-04-11 NOTE — TELEPHONE ENCOUNTER
DIAGNOSIS: Burst fracture of lumbar vertebra, sequela [S32.001S]; Compression fracture of L1 vertebra with routine healing, subsequent encounter [S32.010D]; Age related osteoporosis, unspecified pathological fracture presence [M81.0]     APPOINTMENT DATE: 05/01/24   NOTES STATUS DETAILS   OFFICE NOTE from referring provider Internal 03/20/24: Marie Mtz PA-C   OFFICE NOTE from other specialist Care Everywhere/Internal Ortho:  03/18/24: JANA Miles    [facility] Med:  02/21/24: Dr. Idania Wright   DISCHARGE SUMMARY from hospital Care Everywhere 02/12/24: [facility]   MEDICATION LIST Internal    LABS     CBC/DIFF Care Everywhere 02/12/24   CT SCAN PACS 02/12/24: CT C-Spine  02/12/24: CT T-Spine  02/12/24: CT L-Spine   XRAYS (IMAGES & REPORTS) PACS 03/26/24: DEXA  03/20/24: XR EOS  02/14/24: XR Thoracolumbar Spine  10/25/19: XR Hip  10/16/17: XR L-Spine    [facility]:  03/18/24, 02/12/24: XR Pelvis

## 2024-05-01 ENCOUNTER — TELEPHONE (OUTPATIENT)
Dept: NEUROSURGERY | Facility: CLINIC | Age: 59
End: 2024-05-01

## 2024-05-01 ENCOUNTER — PRE VISIT (OUTPATIENT)
Dept: ORTHOPEDICS | Facility: CLINIC | Age: 59
End: 2024-05-01

## 2024-05-01 ENCOUNTER — ANCILLARY PROCEDURE (OUTPATIENT)
Dept: GENERAL RADIOLOGY | Facility: CLINIC | Age: 59
End: 2024-05-01
Attending: PHYSICIAN ASSISTANT
Payer: COMMERCIAL

## 2024-05-01 ENCOUNTER — OFFICE VISIT (OUTPATIENT)
Dept: NEUROSURGERY | Facility: CLINIC | Age: 59
End: 2024-05-01
Attending: PHYSICIAN ASSISTANT
Payer: COMMERCIAL

## 2024-05-01 ENCOUNTER — OFFICE VISIT (OUTPATIENT)
Dept: ORTHOPEDICS | Facility: CLINIC | Age: 59
End: 2024-05-01
Attending: PHYSICIAN ASSISTANT
Payer: COMMERCIAL

## 2024-05-01 VITALS
BODY MASS INDEX: 29.73 KG/M2 | DIASTOLIC BLOOD PRESSURE: 84 MMHG | WEIGHT: 185 LBS | RESPIRATION RATE: 16 BRPM | SYSTOLIC BLOOD PRESSURE: 145 MMHG | HEART RATE: 61 BPM | HEIGHT: 66 IN | OXYGEN SATURATION: 95 %

## 2024-05-01 DIAGNOSIS — M81.0 AGE RELATED OSTEOPOROSIS, UNSPECIFIED PATHOLOGICAL FRACTURE PRESENCE: Primary | ICD-10-CM

## 2024-05-01 DIAGNOSIS — S32.010D COMPRESSION FRACTURE OF L1 VERTEBRA WITH ROUTINE HEALING, SUBSEQUENT ENCOUNTER: ICD-10-CM

## 2024-05-01 DIAGNOSIS — S32.001S BURST FRACTURE OF LUMBAR VERTEBRA, SEQUELA: ICD-10-CM

## 2024-05-01 DIAGNOSIS — S32.011D CLOSED STABLE BURST FRACTURE OF FIRST LUMBAR VERTEBRA WITH ROUTINE HEALING, SUBSEQUENT ENCOUNTER: Primary | ICD-10-CM

## 2024-05-01 DIAGNOSIS — M81.0 AGE RELATED OSTEOPOROSIS, UNSPECIFIED PATHOLOGICAL FRACTURE PRESENCE: ICD-10-CM

## 2024-05-01 PROCEDURE — 72082 X-RAY EXAM ENTIRE SPI 2/3 VW: CPT | Performed by: STUDENT IN AN ORGANIZED HEALTH CARE EDUCATION/TRAINING PROGRAM

## 2024-05-01 PROCEDURE — 99215 OFFICE O/P EST HI 40 MIN: CPT | Performed by: PHYSICIAN ASSISTANT

## 2024-05-01 PROCEDURE — 77073 BONE LENGTH STUDIES: CPT | Performed by: STUDENT IN AN ORGANIZED HEALTH CARE EDUCATION/TRAINING PROGRAM

## 2024-05-01 PROCEDURE — 99204 OFFICE O/P NEW MOD 45 MIN: CPT | Performed by: FAMILY MEDICINE

## 2024-05-01 RX ORDER — METOPROLOL SUCCINATE 25 MG/1
25 TABLET, EXTENDED RELEASE ORAL DAILY
COMMUNITY
End: 2024-08-26

## 2024-05-01 RX ORDER — MULTIVITAMIN
TABLET ORAL DAILY
COMMUNITY

## 2024-05-01 ASSESSMENT — PAIN SCALES - GENERAL: PAINLEVEL: NO PAIN (0)

## 2024-05-01 NOTE — LETTER
May 1, 2024    RE:  Antoinette Sherwood                              [address] [address] [facility] [address]            To whom it may concern:    Antoinette Sherwood is under my professional care for back fracture.   Patient may return to work starting Monday, May 6.      Restrictions:  Light duty  No lifting more than 30 pounds.  Patient will work with PT to increase this incrementally.      May work up to 5 hours per day for 1 week.  Then increase up to full time as tolerated.        Marie Mtz PA-C  Department of Neurosurgery  Office: [phone]

## 2024-05-01 NOTE — PATIENT INSTRUCTIONS
Okay to start weaning brace by leaving off 1 hour in am and pm, increasing by 1 hour each day in am and pm until you are out of it.      PT order provided to you for [facility].  You will need to contact them to establish care.      Return to work note provided to you.      Follow up with neurosurgery as needed.

## 2024-05-01 NOTE — PROGRESS NOTES
Neurosurgery Clinic Note    ASSESSMENT:  Antoinette Sherwood is a 58 year old female who is following up for L1 burst fracture.  Her fracture has increased from her initial ED visit mid February.  There is increased kyphosis and retropulsion as well as height loss greatest anteriorly.  Opportunistic measure of her bone density is HU 93 at the L2 vertebral body.  This is consistent with osteoporosis which is confirmed by DEXA scan.  She has bone health appointment today.  Patient has no pain and is tolerating ambulation well.  Stable XR today.    PLAN:    Dr. Reina for bone health today    Okay to start weaning brace by leaving off 1 hour in am and pm, increasing by 1 hour each day in am and pm until you are out of it.      PT order provided for [facility] per patient request.  Patient to contact them to establish care.      Return to work note provided.  Start back part time, light duty with restrictions.  Work with PT to increase on lifting.      Follow up with neurosurgery as needed.        History of Present Illness:    Antoinette Sherwood is a 58 year old female who is presenting with a chief complaint of  L1 burst type fracture.    Per chart - patient presented to [facility] ED 2/12/24 (through 2/15/24) after fall off ladder 6 feet when painting and landed on her feet and left side.  She endorses low back and left groin pain after the fall.  She was found to have L1 compression/burst fracture and left pubic ramus fracture.  Bones osteopenic.  She was neuro intact.  She was provided a custom made TLSO brace to wear for 3 mo.     3/20/24 visit - Patient presents today for hospital follow-up with her .  She is wearing the TLSO brace.  She continues to have moderate to severe back pain in the area of the fracture with some extension into her right buttock area.  She denies pain radiating down her legs or into her groin.  Her pain at rest right now is 4 out of 10, in the morning it is the worst  reaching 8 out of 10.  Patient is needing to use baclofen and ibuprofen 2 times per day, and she uses oxycodone for breakthrough pain.  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  She has not been back to work since her fall.  Patient denies having known about any bone density issues in the past.  She has not had prior DEXA scan.  She does take vitamin D and calcium but has not had any bisphosphonates or other bone health medications.  She has 3 children all of whom she has breast-fed.    5/1/24 visit - patient is about 3 months out from her initial fracture dx.  She denies back pain and is not needing analgesics anymore.  No paresthesias in LE.  She has been compliant with the back brace.  She would like to get back to work and out of the brace.     IMAGING   Imaging independently reviewed.    EOS XR 5/1/24 - stable L1 burst fracture with some interval healing.  Stable alignment.      EOS XR 3/20/24 -L1 burst fracture has progressed significantly, anterior portion is almost completely collapsed, some increased posterior retropulsion.  Focal kyphosis.     Findings:  12 rib bearing vertebral bodies and 5 lumbar type vertebral bodies are  identified.  Progression of the L1 superior endplate compression deformity now with  approximately 75% vertebral body height loss, previously approximately  50% height loss on 2/14/2024.  Coronal Deformity:  Mild convex left curvature of the thoracic spine with the apex at  approximately T5.  No substantial global coronal imbalance.  Sagittal Vertical Axis (A vertical line drawn from the center of C7  (matty line) to the posterosuperior aspect of the S1 on sagittal  plane):  less than 4 cm   Weight bearing axis: (Defined as a line drawn from the center of the  femoral head to the mid aspect of the tibial plafond).      Right: Weight bearing axis crosses through the lateral tibial  spine.       Left: Weight bearing axis crosses  through the medial tibial  spine.  Leg length:  (Measured from the top of the femoral head to the center  of tibial plafond.  It is assumed joints are in similar degrees of  extension bilaterally.  Significant difference is defined when  discrepancy is greater than 1.5 cm).        No significant  leg length discrepancy.  Additional Findings:  Borderline enlarged cardiac silhouette. The lungs are clear.  Nonobstructive bowel gas pattern. Cholecystectomy clips.  Impression:  Progression of the L1 superior endplate compression deformity now with  approximately 75% vertebral body height loss.       XR Thoracolumbar Spine Port 2 Views  Result Date: 2/14/2024  For Patients: As a result of the 21st Century Cures Act, medical imaging exams and procedure reports are released immediately into your electronic medical record. You may view this report before your referring provider. If you have questions, please contact your health care provider. EXAM: XR SPINE THORACOLUMBAR JUNCTION MINIMUM OF 2 VIEWS LOCATION: [facility] DATE: 2/14/2024 INDICATION: Eval or F/U FX COMPARISON: Lumbar CT 02/12/2024  L1 upper endplate fracture with 50% height loss. Interval progression since the previous CT with about 25% height loss. No new fractures at other levels. Kyphosis centered at the L1 level. Mild-to-moderate degenerative disc changes at T12-L1 and L1-L2. Bones mildly osteopenic.    CT CHEST ABDOMEN PELVIS W  Result Date: 2/12/2024  For Patients: As a result of the 21st Century Cures Act, medical imaging exams and procedure reports are released immediately into your electronic medical record. You may view this report before your referring provider. If you have questions, please contact your health care provider. EXAM: CT CHEST ABDOMEN PELVIS W LOCATION: [facility] DATE: 2/12/2024 INDICATION: Trauma. Pain. COMPARISON: None. TECHNIQUE: CT scan of the chest, abdomen, and pelvis was performed following injection of IV contrast.  Multiplanar reformats were obtained. Dose reduction techniques were used. CONTRAST: Omnipaque [id number] FINDINGS: LUNGS AND PLEURA: Mild atelectasis in the dependent portion of both lungs. Calcified granulomas left lower lobe. Small amount of interstitial and groundglass change in the right perihilar region may represent pneumonitis. No consolidation. No pleural fluid. MEDIASTINUM/AXILLAE: No lymphadenopathy. No pericardial effusion. Moderate-sized esophageal hiatal hernia. CORONARY ARTERY CALCIFICATION: Mild. HEPATOBILIARY: Cholecystectomy. Mild fatty infiltration of the liver. PANCREAS: Normal. SPLEEN: Normal. ADRENAL GLANDS: Normal. KIDNEYS/BLADDER: Subcentimeter angiomyolipoma upper pole right kidney. Subcentimeter cortical cyst right kidney requires no additional follow-up. The left kidney demonstrates a 6 x 3 mm nonobstructing stone. The urinary bladder is grossly unremarkable. BOWEL: Diverticular disease of the colon without evidence for acute diverticulitis. LYMPH NODES: Normal. VASCULATURE: Unremarkable. PELVIC ORGANS: Prior hysterectomy. No adnexal lesions or free fluid. MUSCULOSKELETAL: Compression fracture deformity of the L1 vertebral body. There is loss of approximately 15% of the vertebral height. No subluxation.  1.  Mild compression fracture deformity of the L1 vertebral body. No other fractures or hematoma. 2.  Interstitial and groundglass change in the right perihilar region may be due to small amount of pneumonitis. No consolidation. 3.  Cholecystectomy and hysterectomy. 4.  Diverticular disease of the colon without diverticulitis. 5.  Small angiomyolipoma right kidney. No follow-up indicated. 6.  Nonobstructing stone lower pole left kidney. 7.  Esophageal hiatal hernia.    CT Thoracic, Lumbar Spine Reconstructed  Result Date: 2/12/2024  EXAM: CT HEAD BRAIN WO, CT LUMBAR SPINE 2D RECONSTRUCTION, CT THORACIC SPINE 2D RECONSTRUCTION, CT SPINE CERVICAL WO LOCATION: [facility] DATE: 2/12/2024  INDICATION: Trauma, head, neck and back injury. COMPARISON: None. TECHNIQUE: 1) Routine CT Head without IV contrast. Multiplanar reformats. Dose reduction techniques were used. 2) Routine CT Cervical Spine without IV contrast. Multiplanar reformats. Dose reduction techniques were used. 3) Routine CT Thoracic Spine without IV contrast. Multiplanar reformats. Dose reduction techniques were used. 4) Routine CT Lumbar Spine without IV contrast. Multiplanar reformats. Dose reduction techniques were used. FINDINGS: HEAD CT: INTRACRANIAL CONTENTS: No intracranial hemorrhage, extraaxial collection, or mass effect.  No CT evidence of acute infarct. Mild presumed chronic small vessel ischemic changes. Mild generalized volume loss. No hydrocephalus. VISUALIZED ORBITS/SINUSES/MASTOIDS: No intraorbital abnormality. No paranasal sinus mucosal disease. No middle ear or mastoid effusion. BONES/SOFT TISSUES: No acute abnormality. CERVICAL SPINE CT: VERTEBRA: Cervical vertebra are normal in height. There is nonspecific straightening of the expected lordosis but the alignment is grossly normal. No fracture or posttraumatic subluxation. CANAL/FORAMINA: No canal or neural foraminal stenosis. PARASPINAL: No extraspinal abnormality. Visualized lung fields are clear. THORACIC SPINE CT: VERTEBRA: Normal vertebral body heights and alignment. No fracture or posttraumatic subluxation. Mild S-type curvature of the spine. CANAL/FORAMINA: No canal or neural foraminal stenosis. PARASPINAL: Please refer to the report of the CT chest, abdomen, and pelvis. LUMBAR SPINE CT: VERTEBRA: Mild to moderate recent appearing burst compression fracture at the L1 level with approximately 25% maximal height loss. Minimal buckling of the posterior cortex into the ventral epidural space. Additional lumbar vertebra are grossly normal in height and alignment. CANAL/FORAMINA: No canal or neural foraminal stenosis. PARASPINAL: Please refer to the report of the CT  "chest, abdomen, and pelvis.  HEAD CT: 1.  No acute intracranial hemorrhage or calvarial fracture. CERVICAL SPINE CT: 1.  No fracture or posttraumatic subluxation. 2.  No high-grade spinal canal or neural foraminal stenosis. THORACIC SPINE CT: 1.  No fracture or posttraumatic subluxation. 2.  No high-grade spinal canal or neural foraminal stenosis. LUMBAR SPINE CT: 1.  Mild to moderate acute or subacute burst type compression fracture at L1 with approximately 25% maximal height loss. Minimal buckling of the posterior cortex into the ventral epidural space without significant central canal stenosis. NOTE: ABNORMAL REPORT THE DICTATION ABOVE DESCRIBES AN ABNORMALITY FOR WHICH FOLLOW-UP IS NEEDED.     XR Pelvis and Hip Left 1 View  Result Date: 2/12/2024  For Patients: As a result of the 21st Century Cures Act, medical imaging exams and procedure reports are released immediately into your electronic medical record. You may view this report before your referring provider. If you have questions, please contact your health care provider. EXAM: XR HIP 1 VIEW W PELVIS LEFT LOCATION: [facility] DATE: 2/12/2024 INDICATION: free text)->fall, pain COMPARISON: Same-day CT  Normal joint spaces and alignment. Nondisplaced fracture of the left inferior pubic ramus, better characterized on same day CT. Contrast opacifies the visualized ureters and urinary bladder. NOTE: ABNORMAL REPORT THE DICTATION ABOVE DESCRIBES AN ABNORMALITY FOR WHICH FOLLOW-UP IS NEEDED.      Physical Exam   BP (!) 145/84 (BP Location: Right arm, Patient Position: Sitting)   Pulse 61   Resp 16   Ht 1.676 m (5' 6\")   Wt 83.9 kg (185 lb)   LMP 07/01/2005 (Exact Date)   SpO2 95%   BMI 29.86 kg/m        Neurological:  Alert, NAD, and oriented to person, place, and time.   No cranial nerve deficit   Wearing TLSO brace appropriately.  No assistive devices for ambulation.    No axial or paraspinal T/L spine pain  5/5 BLE  3+/3+ patellar  2+/2+ ankle  No " clonus  SILT BLE    Review of Systems   See HPI     Past Medical History:   Diagnosis Date    Anxiety     Calculus of kidney     Abstracted 07/01/02    Hypertension        Past Surgical History:   Procedure Laterality Date    CL AFF SURGICAL PATHOLOGY  2006    breast reduction    EXCISE LESION TRUNK  11/21/2012    Procedure: EXCISE LESION TRUNK;  Excision back mass;  Surgeon: Saqib Abraham MD;  Location: [facility] OR    HC CORRECT BUNION,METATARSAL OSTEOTOMY  12/03/09    right great toe    HC EXCISION LESION/TENDON-SHEATH/CAPSULE, FOOT  12/03/09    HC REMV TARSAL/METATARSAL BENIGN BONE LESN  12/03/09    HYSTERECTOMY, PAP NO LONGER INDICATED      HYSTERECTOMY, VAGINAL  2005    menorrhagia, normal paps    LAPAROSCOPIC CHOLECYSTECTOMY  2001    LITHOTRIPSY  2007    right kidney    TUBAL LIGATION  2001    ZZC NONSPECIFIC PROCEDURE  08/96    Left salpingectomy   Abstracted 07/01/02       Social History     Socioeconomic History    Marital status:    Tobacco Use    Smoking status: Never     Passive exposure: Never    Smokeless tobacco: Never   Vaping Use    Vaping Use: Never used   Substance and Sexual Activity    Alcohol use: Yes     Alcohol/week: 5.0 standard drinks of alcohol     Types: 5 Standard drinks or equivalent per week     Comment: july    Drug use: No    Sexual activity: Yes     Partners: Male     Birth control/protection: Surgical       family history includes Colon Cancer in her father; Diabetes in her mother; Eye Disorder in her mother; Genitourinary Problems in her mother; Hypertension in her father; LUNG DISEASE in her mother; Lipids in her father; Thyroid Disease in her father.         I spent 47 minutes spent in patient care, independent review and interpretation of medical records/imaging, reviewing old records.      Marie Mtz PA-C  Department of Neurosurgery  Office: [phone]

## 2024-05-01 NOTE — LETTER
5/1/2024      RE: Antoinette Sherwood  [id number] [address] [id number]     Dear Colleague,    Thank you for referring your patient, Antoinette Sherwood, to the [facility] [place]. Please see a copy of my visit note below.    SUBJECTIVE:    Antoinette Sherwood is a 58 year old female here today to discuss osteoporosis.  Previous DEXA done 3/26/24.  Neurosurgery- compression fractures, but no surgery needed. T-score showed her to be Osteoporosis.  Diet 30% of calcium through diet. Risk factors for osteoporosis include  or , skin Cancer- no chemo or radiation, and +kidney stones, No MI, No strokes.    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  Vit D at 49  Mom- no osteoporosis, no hip fx        Osteoporosis Risk Score/FRAX score  -osteoporosis  http://[email or web address].ac.uk/FRAX/  Risk of Hip Fracture: 4.1% (Significant if >3%)  Risk of Overall Fracture: 19.4% (Significant if >20%)    Past Medical History:   Diagnosis Date     Anxiety      Calculus of kidney     Abstracted 07/01/02     Hypertension        Past Surgical History:   Procedure Laterality Date     CL AFF SURGICAL PATHOLOGY  2006    breast reduction     EXCISE LESION TRUNK  11/21/2012    Procedure: EXCISE LESION TRUNK;  Excision back mass;  Surgeon: Saqib Abraham MD;  Location: [facility]     HC CORRECT BUNION,METATARSAL OSTEOTOMY  12/03/09    right great toe     HC EXCISION LESION/TENDON-SHEATH/CAPSULE, FOOT  12/03/09     HC REMV TARSAL/METATARSAL BENIGN BONE LESN  12/03/09     HYSTERECTOMY, PAP NO LONGER INDICATED       HYSTERECTOMY, VAGINAL  2005     menorrhagia, normal paps     LAPAROSCOPIC CHOLECYSTECTOMY  2001     LITHOTRIPSY  2007    right kidney     TUBAL LIGATION  2001     [facility] NONSPECIFIC PROCEDURE  08/96    Left salpingectomy   Abstracted 07/01/02       Current Outpatient Medications   Medication Sig Dispense Refill     aspirin 81 MG chewable tablet Take 1 tablet (81 mg) by mouth daily 1 tablet 0     buPROPion (WELLBUTRIN XL) 150 MG 24 hr tablet Take 1 tablet (150 mg) by mouth every morning 90 tablet 3     CALCIUM 600 + D 600-200 MG-UNIT OR TABS  3 MONTHS 1 YEAR     CENTRUM SILVER OR TABS 1 TABLET DAILY       Cholecalciferol (VITAMIN D3) 1000 UNIT TABS Take 2 tablets by mouth daily.       hydrochlorothiazide (HYDRODIURIL) 25 MG tablet Take 1 tablet (25 mg) by mouth daily 90 tablet 3     ibuprofen (IBU) 600 MG tablet Take 1 tablet by mouth every 6 hours as needed for pain. 60 tablet 0     losartan (COZAAR) 25 MG tablet Take 1 tablet (25 mg) by mouth daily 90 tablet 3     methocarbamol (ROBAXIN) 500 MG tablet TAKE 1-2 TABLETS (500-1,000 MG) BY MOUTH 4 TIMES DAILY AS NEEDED FOR MUSCLE SPASMS 150 tablet 0     metoprolol succinate ER (TOPROL XL) 25 MG 24 hr tablet Take 25 mg by mouth daily       multivitamin w/minerals (MULTI-VITAMIN) tablet Take by mouth daily       OMEGA-3 CF 1000 MG OR CAPS   0     omeprazole (PRILOSEC) 20 MG DR capsule Take 20 mg by mouth daily       oxyCODONE (ROXICODONE) 5 MG tablet Take 1 tablet (5 mg) by mouth daily 20 tablet 0     PEPCID AC OR None Entered       rizatriptan (MAXALT) 10 MG tablet Take 1 tablet (10 mg) by mouth at onset of headache for migraine (may repeat dose in 2 hour if needed) 18 tablet 1     sertraline (ZOLOFT) 100 MG tablet Take 1 tablet (100 mg) by mouth daily 90 tablet 3     traZODone (DESYREL) 100 MG tablet Take 1 tablet (100 mg) by mouth nightly as needed for sleep 90 tablet 3       Family History   Problem Relation Age of Onset     Eye Disorder Mother         glaucoma     Diabetes Mother      Genitourinary  Problems Mother         kidney stones     LUNG DISEASE Mother         chemical work place exposure     Thyroid Disease Father      Hypertension Father      Lipids Father      Colon Cancer Father      Asthma No family hx of      C.A.D. No family hx of      Cerebrovascular Disease No family hx of      Breast Cancer No family hx of      Cancer - colorectal No family hx of      Prostate Cancer No family hx of      Alzheimer Disease No family hx of      Arthritis No family hx of      Blood Disease No family hx of      Cancer No family hx of      Cardiovascular No family hx of      Circulatory No family hx of      Gastrointestinal Disease No family hx of      Heart Disease No family hx of      Musculoskeletal Disorder No family hx of      Neurologic Disorder No family hx of      Respiratory No family hx of        Social History     Socioeconomic History     Marital status:      Spouse name: Not on file     Number of children: Not on file     Years of education: Not on file     Highest education level: Not on file   Occupational History     Not on file   Tobacco Use     Smoking status: Never     Passive exposure: Never     Smokeless tobacco: Never   Vaping Use     Vaping status: Never Used   Substance and Sexual Activity     Alcohol use: Yes     Alcohol/week: 5.0 standard drinks of alcohol     Types: 5 Standard drinks or equivalent per week     Comment: july     Drug use: No     Sexual activity: Yes     Partners: Male     Birth control/protection: Surgical   Other Topics Concern     Parent/sibling w/ CABG, MI or angioplasty before 65F 55M? Not Asked   Social History Narrative     Not on file     Social Determinants of Health     Financial Resource Strain: Low Risk  (2/13/2024)    [facility]    Financial Resource Strain      Difficulty of Paying Living Expenses: 3      Difficulty of Paying Living Expenses: Not on file  "  Food Insecurity: No Food Insecurity (2/13/2024)    [facility]    Food Insecurity      Worried About Running Out of Food in the Last Year: 1   Transportation Needs: No Transportation Needs (2/13/2024)    [facility], [facility]    Transportation Needs      Lack of Transportation (Medical): 1   Physical Activity: Not on file   Stress: Not on file   Social Connections: Socially Integrated (2/13/2024)    [facility] from [facility], [facility]    Social Connections      Frequency of Communication with Friends and Family: 0   Interpersonal Safety: Low Risk  (4/8/2024)    Interpersonal Safety      Do you feel physically and emotionally safe where you currently live?: Yes      Within the past 12 months, have you been hit, slapped, kicked or otherwise physically hurt by someone?: No      Within the past 12 months, have you been humiliated or emotionally abused in other ways by your partner or ex-partner?: No   Housing Stability: Low Risk  (2/13/2024)    [facility] from [facility], [facility]    Housing Stability      Unable to Pay for Housing in the Last Year: 1       OBJECTIVE:  LMP 07/01/2005 (Exact Date)   There has not been a change in patients height. 5'6\"    ASSESSMENT:  Osteoporosis    PLAN:  Labs  [employer] information/[employer] information    The importance of calcium and vitamin D in bone health was discussed in detail. A calcium intake of 1500 mg total per day in divided doses, to include diet and supplements, was recommended.  I have urged the patient to obtain as much as the recommended amount of calcium as possible from the diet.  I recommended use of the International Osteoporosis Foundation Calcium Calculator " to get an estimate of daily total intake in the diet (www.iofcalciumcalculator).800-1000 international units vitamin D daily was also recommended.       We also discussed the role of weight bearing exercise for the treatment of bone loss. I have also recommended weight training at a minimum of 2x/week.   The patient will be referred to the [facility] for the osteoporosis protocol.      Bella Reina MD, CAQ   Sports Medicine and Bone Health

## 2024-05-01 NOTE — LETTER
5/1/2024       RE: Antoinette Sherwood  [address] [facility] [address]       Dear Colleague,    Thank you for referring your patient, Antoinette Sherwood, to the [facility] NEUROSURGERY CLINIC [place] at [facility]. Please see a copy of my visit note below.        Neurosurgery Clinic Note    ASSESSMENT:  Antoinette Sherwood is a 58 year old female who is following up for L1 burst fracture.  Her fracture has increased from her initial ED visit mid February.  There is increased kyphosis and retropulsion as well as height loss greatest anteriorly.  Opportunistic measure of her bone density is HU 93 at the L2 vertebral body.  This is consistent with osteoporosis which is confirmed by DEXA scan.  She has bone health appointment today.  Patient has no pain and is tolerating ambulation well.  Stable XR today.    PLAN:    Dr. Reina for bone health today    Okay to start weaning brace by leaving off 1 hour in am and pm, increasing by 1 hour each day in am and pm until you are out of it.      PT order provided for [facility] per patient request.  Patient to contact them to establish care.      Return to work note provided.  Start back part time, light duty with restrictions.  Work with PT to increase on lifting.      Follow up with neurosurgery as needed.        History of Present Illness:    Antoinette Sherwood is a 58 year old female who is presenting with a chief complaint of  L1 burst type fracture.    Per chart - patient presented to [facility] ED 2/12/24 (through 2/15/24) after fall off ladder 6 feet when painting and landed on her feet and left side.  She endorses low back and left groin pain after the fall.  She was found to have L1 compression/burst fracture and left pubic ramus fracture.  Bones osteopenic.  She was neuro intact.  She was provided a custom made TLSO brace to wear for 3 mo.     3/20/24 visit - Patient presents today  for hospital follow-up with her .  She is wearing the TLSO brace.  She continues to have moderate to severe back pain in the area of the fracture with some extension into her right buttock area.  She denies pain radiating down her legs or into her groin.  Her pain at rest right now is 4 out of 10, in the morning it is the worst reaching 8 out of 10.  Patient is needing to use baclofen and ibuprofen 2 times per day, and she uses oxycodone for breakthrough pain.  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  She has not been back to work since her fall.  Patient denies having known about any bone density issues in the past.  She has not had prior DEXA scan.  She does take vitamin D and calcium but has not had any bisphosphonates or other bone health medications.  She has 3 children all of whom she has breast-fed.    5/1/24 visit - patient is about 3 months out from her initial fracture dx.  She denies back pain and is not needing analgesics anymore.  No paresthesias in LE.  She has been compliant with the back brace.  She would like to get back to work and out of the brace.     IMAGING   Imaging independently reviewed.    EOS XR 5/1/24 - stable L1 burst fracture with some interval healing.  Stable alignment.      EOS XR 3/20/24 -L1 burst fracture has progressed significantly, anterior portion is almost completely collapsed, some increased posterior retropulsion.  Focal kyphosis.     Findings:  12 rib bearing vertebral bodies and 5 lumbar type vertebral bodies are  identified.  Progression of the L1 superior endplate compression deformity now with  approximately 75% vertebral body height loss, previously approximately  50% height loss on 2/14/2024.  Coronal Deformity:  Mild convex left curvature of the thoracic spine with the apex at  approximately T5.  No substantial global coronal imbalance.  Sagittal Vertical Axis (A vertical line drawn from the center of  C7  (matty line) to the posterosuperior aspect of the S1 on sagittal  plane):  less than 4 cm   Weight bearing axis: (Defined as a line drawn from the center of the  femoral head to the mid aspect of the tibial plafond).      Right: Weight bearing axis crosses through the lateral tibial  spine.       Left: Weight bearing axis crosses through the medial tibial  spine.  Leg length:  (Measured from the top of the femoral head to the center  of tibial plafond.  It is assumed joints are in similar degrees of  extension bilaterally.  Significant difference is defined when  discrepancy is greater than 1.5 cm).        No significant  leg length discrepancy.  Additional Findings:  Borderline enlarged cardiac silhouette. The lungs are clear.  Nonobstructive bowel gas pattern. Cholecystectomy clips.  Impression:  Progression of the L1 superior endplate compression deformity now with  approximately 75% vertebral body height loss.       XR Thoracolumbar Spine Port 2 Views  Result Date: 2/14/2024  For Patients: As a result of the 21st Century Cures Act, medical imaging exams and procedure reports are released immediately into your electronic medical record. You may view this report before your referring provider. If you have questions, please contact your health care provider. EXAM: XR SPINE THORACOLUMBAR JUNCTION MINIMUM OF 2 VIEWS LOCATION: [facility] DATE: 2/14/2024 INDICATION: Eval or F/U FX COMPARISON: Lumbar CT 02/12/2024  L1 upper endplate fracture with 50% height loss. Interval progression since the previous CT with about 25% height loss. No new fractures at other levels. Kyphosis centered at the L1 level. Mild-to-moderate degenerative disc changes at T12-L1 and L1-L2. Bones mildly osteopenic.    CT CHEST ABDOMEN PELVIS W  Result Date: 2/12/2024  For Patients: As a result of the 21st Century Cures Act, medical imaging exams and procedure reports are released immediately into your electronic medical record. You may  view this report before your referring provider. If you have questions, please contact your health care provider. EXAM: CT CHEST ABDOMEN PELVIS W LOCATION: [facility] DATE: 2/12/2024 INDICATION: Trauma. Pain. COMPARISON: None. TECHNIQUE: CT scan of the chest, abdomen, and pelvis was performed following injection of IV contrast. Multiplanar reformats were obtained. Dose reduction techniques were used. CONTRAST: Omnipaque [id number] FINDINGS: LUNGS AND PLEURA: Mild atelectasis in the dependent portion of both lungs. Calcified granulomas left lower lobe. Small amount of interstitial and groundglass change in the right perihilar region may represent pneumonitis. No consolidation. No pleural fluid. MEDIASTINUM/AXILLAE: No lymphadenopathy. No pericardial effusion. Moderate-sized esophageal hiatal hernia. CORONARY ARTERY CALCIFICATION: Mild. HEPATOBILIARY: Cholecystectomy. Mild fatty infiltration of the liver. PANCREAS: Normal. SPLEEN: Normal. ADRENAL GLANDS: Normal. KIDNEYS/BLADDER: Subcentimeter angiomyolipoma upper pole right kidney. Subcentimeter cortical cyst right kidney requires no additional follow-up. The left kidney demonstrates a 6 x 3 mm nonobstructing stone. The urinary bladder is grossly unremarkable. BOWEL: Diverticular disease of the colon without evidence for acute diverticulitis. LYMPH NODES: Normal. VASCULATURE: Unremarkable. PELVIC ORGANS: Prior hysterectomy. No adnexal lesions or free fluid. MUSCULOSKELETAL: Compression fracture deformity of the L1 vertebral body. There is loss of approximately 15% of the vertebral height. No subluxation.  1.  Mild compression fracture deformity of the L1 vertebral body. No other fractures or hematoma. 2.  Interstitial and groundglass change in the right perihilar region may be due to small amount of pneumonitis. No consolidation. 3.  Cholecystectomy and hysterectomy. 4.  Diverticular disease of the colon without diverticulitis. 5.  Small angiomyolipoma right kidney.  No follow-up indicated. 6.  Nonobstructing stone lower pole left kidney. 7.  Esophageal hiatal hernia.    CT Thoracic, Lumbar Spine Reconstructed  Result Date: 2/12/2024  EXAM: CT HEAD BRAIN WO, CT LUMBAR SPINE 2D RECONSTRUCTION, CT THORACIC SPINE 2D RECONSTRUCTION, CT SPINE CERVICAL WO LOCATION: [facility] DATE: 2/12/2024 INDICATION: Trauma, head, neck and back injury. COMPARISON: None. TECHNIQUE: 1) Routine CT Head without IV contrast. Multiplanar reformats. Dose reduction techniques were used. 2) Routine CT Cervical Spine without IV contrast. Multiplanar reformats. Dose reduction techniques were used. 3) Routine CT Thoracic Spine without IV contrast. Multiplanar reformats. Dose reduction techniques were used. 4) Routine CT Lumbar Spine without IV contrast. Multiplanar reformats. Dose reduction techniques were used. FINDINGS: HEAD CT: INTRACRANIAL CONTENTS: No intracranial hemorrhage, extraaxial collection, or mass effect.  No CT evidence of acute infarct. Mild presumed chronic small vessel ischemic changes. Mild generalized volume loss. No hydrocephalus. VISUALIZED ORBITS/SINUSES/MASTOIDS: No intraorbital abnormality. No paranasal sinus mucosal disease. No middle ear or mastoid effusion. BONES/SOFT TISSUES: No acute abnormality. CERVICAL SPINE CT: VERTEBRA: Cervical vertebra are normal in height. There is nonspecific straightening of the expected lordosis but the alignment is grossly normal. No fracture or posttraumatic subluxation. CANAL/FORAMINA: No canal or neural foraminal stenosis. PARASPINAL: No extraspinal abnormality. Visualized lung fields are clear. THORACIC SPINE CT: VERTEBRA: Normal vertebral body heights and alignment. No fracture or posttraumatic subluxation. Mild S-type curvature of the spine. CANAL/FORAMINA: No canal or neural foraminal stenosis. PARASPINAL: Please refer to the report of the CT chest, abdomen, and pelvis. LUMBAR SPINE CT: VERTEBRA: Mild to moderate recent appearing burst  "compression fracture at the L1 level with approximately 25% maximal height loss. Minimal buckling of the posterior cortex into the ventral epidural space. Additional lumbar vertebra are grossly normal in height and alignment. CANAL/FORAMINA: No canal or neural foraminal stenosis. PARASPINAL: Please refer to the report of the CT chest, abdomen, and pelvis.  HEAD CT: 1.  No acute intracranial hemorrhage or calvarial fracture. CERVICAL SPINE CT: 1.  No fracture or posttraumatic subluxation. 2.  No high-grade spinal canal or neural foraminal stenosis. THORACIC SPINE CT: 1.  No fracture or posttraumatic subluxation. 2.  No high-grade spinal canal or neural foraminal stenosis. LUMBAR SPINE CT: 1.  Mild to moderate acute or subacute burst type compression fracture at L1 with approximately 25% maximal height loss. Minimal buckling of the posterior cortex into the ventral epidural space without significant central canal stenosis. NOTE: ABNORMAL REPORT THE DICTATION ABOVE DESCRIBES AN ABNORMALITY FOR WHICH FOLLOW-UP IS NEEDED.     XR Pelvis and Hip Left 1 View  Result Date: 2/12/2024  For Patients: As a result of the 21st Century Cures Act, medical imaging exams and procedure reports are released immediately into your electronic medical record. You may view this report before your referring provider. If you have questions, please contact your health care provider. EXAM: XR HIP 1 VIEW W PELVIS LEFT LOCATION: [facility] DATE: 2/12/2024 INDICATION: free text)->fall, pain COMPARISON: Same-day CT  Normal joint spaces and alignment. Nondisplaced fracture of the left inferior pubic ramus, better characterized on same day CT. Contrast opacifies the visualized ureters and urinary bladder. NOTE: ABNORMAL REPORT THE DICTATION ABOVE DESCRIBES AN ABNORMALITY FOR WHICH FOLLOW-UP IS NEEDED.      Physical Exam   BP (!) 145/84 (BP Location: Right arm, Patient Position: Sitting)   Pulse 61   Resp 16   Ht 1.676 m (5' 6\")   Wt 83.9 kg " (185 lb)   LMP 07/01/2005 (Exact Date)   SpO2 95%   BMI 29.86 kg/m        Neurological:  Alert, NAD, and oriented to person, place, and time.   No cranial nerve deficit   Wearing TLSO brace appropriately.  No assistive devices for ambulation.    No axial or paraspinal T/L spine pain  5/5 BLE  3+/3+ patellar  2+/2+ ankle  No clonus  SILT BLE    Review of Systems   See HPI     Past Medical History:   Diagnosis Date    Anxiety     Calculus of kidney     Abstracted 07/01/02    Hypertension        Past Surgical History:   Procedure Laterality Date    CL AFF SURGICAL PATHOLOGY  2006    breast reduction    EXCISE LESION TRUNK  11/21/2012    Procedure: EXCISE LESION TRUNK;  Excision back mass;  Surgeon: Saqib Abraham MD;  Location: [facility] OR    HC CORRECT BUNION,METATARSAL OSTEOTOMY  12/03/09    right great toe    HC EXCISION LESION/TENDON-SHEATH/CAPSULE, FOOT  12/03/09    HC REMV TARSAL/METATARSAL BENIGN BONE LESN  12/03/09    HYSTERECTOMY, PAP NO LONGER INDICATED      HYSTERECTOMY, VAGINAL  2005    menorrhagia, normal paps    LAPAROSCOPIC CHOLECYSTECTOMY  2001    LITHOTRIPSY  2007    right kidney    TUBAL LIGATION  2001    ZZC NONSPECIFIC PROCEDURE  08/96    Left salpingectomy   Abstracted 07/01/02       Social History     Socioeconomic History    Marital status:    Tobacco Use    Smoking status: Never     Passive exposure: Never    Smokeless tobacco: Never   Vaping Use    Vaping Use: Never used   Substance and Sexual Activity    Alcohol use: Yes     Alcohol/week: 5.0 standard drinks of alcohol     Types: 5 Standard drinks or equivalent per week     Comment: [email or web address]    Drug use: No    Sexual activity: Yes     Partners: Male     Birth control/protection: Surgical       family history includes Colon Cancer in her father; Diabetes in her mother; Eye Disorder in her mother; Genitourinary Problems in her mother; Hypertension in her father; LUNG DISEASE in her mother; Lipids in her father; Thyroid Disease in her  father.         I spent 47 minutes spent in patient care, independent review and interpretation of medical records/imaging, reviewing old records.        Again, thank you for allowing me to participate in the care of your patient.      Sincerely,    Marie Mtz PA-C

## 2024-05-01 NOTE — TELEPHONE ENCOUNTER
Patient brought Certification of Health Care Provider for Employee's serious health condition form -- emailed form to Nuria Olsen  to complete and form sent to scanning.      Fitness for duty form filled out & sent to scanning.

## 2024-05-01 NOTE — PROGRESS NOTES
SUBJECTIVE:    Antoinette COYLE Clayton Sherwood is a 58 year old female here today to discuss osteoporosis.  Previous DEXA done 3/26/24.  Neurosurgery- compression fractures, but no surgery needed. T-score showed her to be Osteoporosis.  Diet 30% of calcium through diet. Risk factors for osteoporosis include  or , skin Cancer- no chemo or radiation, and +kidney stones, No MI, No strokes.    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  Vit D at 49  Mom- no osteoporosis, no hip fx        Osteoporosis Risk Score/FRAX score  -osteoporosis  [email or web address]/FRAX/  Risk of Hip Fracture: 4.1% (Significant if >3%)  Risk of Overall Fracture: 19.4% (Significant if >20%)    Past Medical History:   Diagnosis Date    Anxiety     Calculus of kidney     Abstracted 07/01/02    Hypertension        Past Surgical History:   Procedure Laterality Date    CL AFF SURGICAL PATHOLOGY  2006    breast reduction    EXCISE LESION TRUNK  11/21/2012    Procedure: EXCISE LESION TRUNK;  Excision back mass;  Surgeon: Saqib Abraham MD;  Location: [facility] OR    HC CORRECT BUNION,METATARSAL OSTEOTOMY  12/03/09    right great toe    HC EXCISION LESION/TENDON-SHEATH/CAPSULE, FOOT  12/03/09    HC REMV TARSAL/METATARSAL BENIGN BONE LESN  12/03/09    HYSTERECTOMY, PAP NO LONGER INDICATED      HYSTERECTOMY, VAGINAL  2005    menorrhagia, normal paps    LAPAROSCOPIC CHOLECYSTECTOMY  2001    LITHOTRIPSY  2007    right kidney    TUBAL LIGATION  2001    ZC NONSPECIFIC PROCEDURE  08/96    Left salpingectomy   Abstracted 07/01/02       Current Outpatient Medications   Medication Sig Dispense Refill    aspirin 81 MG chewable  tablet Take 1 tablet (81 mg) by mouth daily 1 tablet 0    buPROPion (WELLBUTRIN XL) 150 MG 24 hr tablet Take 1 tablet (150 mg) by mouth every morning 90 tablet 3    CALCIUM 600 + D 600-200 MG-UNIT OR TABS  3 MONTHS 1 YEAR    CENTRUM SILVER OR TABS 1 TABLET DAILY      Cholecalciferol (VITAMIN D3) 1000 UNIT TABS Take 2 tablets by mouth daily.      hydrochlorothiazide (HYDRODIURIL) 25 MG tablet Take 1 tablet (25 mg) by mouth daily 90 tablet 3    ibuprofen (IBU) 600 MG tablet Take 1 tablet by mouth every 6 hours as needed for pain. 60 tablet 0    losartan (COZAAR) 25 MG tablet Take 1 tablet (25 mg) by mouth daily 90 tablet 3    methocarbamol (ROBAXIN) 500 MG tablet TAKE 1-2 TABLETS (500-1,000 MG) BY MOUTH 4 TIMES DAILY AS NEEDED FOR MUSCLE SPASMS 150 tablet 0    metoprolol succinate ER (TOPROL XL) 25 MG 24 hr tablet Take 25 mg by mouth daily      multivitamin w/minerals (MULTI-VITAMIN) tablet Take by mouth daily      OMEGA-3 CF 1000 MG OR CAPS   0    omeprazole (PRILOSEC) 20 MG DR capsule Take 20 mg by mouth daily      oxyCODONE (ROXICODONE) 5 MG tablet Take 1 tablet (5 mg) by mouth daily 20 tablet 0    PEPCID AC OR None Entered      rizatriptan (MAXALT) 10 MG tablet Take 1 tablet (10 mg) by mouth at onset of headache for migraine (may repeat dose in 2 hour if needed) 18 tablet 1    sertraline (ZOLOFT) 100 MG tablet Take 1 tablet (100 mg) by mouth daily 90 tablet 3    traZODone (DESYREL) 100 MG tablet Take 1 tablet (100 mg) by mouth nightly as needed for sleep 90 tablet 3       Family History   Problem Relation Age of Onset    Eye Disorder Mother         glaucoma    Diabetes Mother     Genitourinary Problems Mother         kidney stones    LUNG DISEASE Mother         chemical work place exposure    Thyroid Disease Father     Hypertension Father     Lipids Father     Colon Cancer Father     Asthma No family hx of     C.A.D. No family hx of     Cerebrovascular Disease No family hx of     Breast Cancer No family hx of      Cancer - colorectal No family hx of     Prostate Cancer No family hx of     Alzheimer Disease No family hx of     Arthritis No family hx of     Blood Disease No family hx of     Cancer No family hx of     Cardiovascular No family hx of     Circulatory No family hx of     Gastrointestinal Disease No family hx of     Heart Disease No family hx of     Musculoskeletal Disorder No family hx of     Neurologic Disorder No family hx of     Respiratory No family hx of        Social History     Socioeconomic History    Marital status:      Spouse name: Not on file    Number of children: Not on file    Years of education: Not on file    Highest education level: Not on file   Occupational History    Not on file   Tobacco Use    Smoking status: Never     Passive exposure: Never    Smokeless tobacco: Never   Vaping Use    Vaping status: Never Used   Substance and Sexual Activity    Alcohol use: Yes     Alcohol/week: 5.0 standard drinks of alcohol     Types: 5 Standard drinks or equivalent per week     Comment: juyl    Drug use: No    Sexual activity: Yes     Partners: Male     Birth control/protection: Surgical   Other Topics Concern    Parent/sibling w/ CABG, MI or angioplasty before 65F 55M? Not Asked   Social History Narrative    Not on file     Social Determinants of Health     Financial Resource Strain: Low Risk  (2/13/2024)    [facility], [facility]    Financial Resource Strain     Difficulty of Paying Living Expenses: 3     Difficulty of Paying Living Expenses: Not on file   Food Insecurity: No Food Insecurity (2/13/2024)    [facility], [facility]    Food Insecurity     Worried About Running Out of Food in the Last Year: 1   Transportation Needs: No Transportation Needs (2/13/2024)    [facility], [facility]  [facility]    Transportation Needs     Lack of Transportation (Medical): 1   Physical Activity: Not on file   Stress: Not on file   Social Connections: Socially Integrated (2/13/2024)    [facility], [facility]    Social Connections     Frequency of Communication with Friends and Family: 0   Interpersonal Safety: Low Risk  (4/8/2024)    Interpersonal Safety     Do you feel physically and emotionally safe where you currently live?: Yes     Within the past 12 months, have you been hit, slapped, kicked or otherwise physically hurt by someone?: No     Within the past 12 months, have you been humiliated or emotionally abused in other ways by your partner or ex-partner?: No   Housing Stability: Low Risk  (2/13/2024)    [email or web address], [facility]    Housing Stability     Unable to Pay for Housing in the Last Year: 1       OBJECTIVE:  LMP 07/01/2005 (Exact Date)   There has not been a change in patients height. 5'6\"    ASSESSMENT:  Osteoporosis    PLAN:  Labs  [employer] information/[employer] information    The importance of calcium and vitamin D in bone health was discussed in detail. A calcium intake of 1500 mg total per day in divided doses, to include diet and supplements, was recommended.  I have urged the patient to obtain as much as the recommended amount of calcium as possible from the diet.  I recommended use of the International Osteoporosis Foundation Calcium Calculator to get an estimate of daily total intake in the diet (www.iofcalciumcalculator).800-1000 international units vitamin D daily was also recommended.       We also discussed the role of weight bearing exercise for the treatment of bone loss. I have also recommended weight training at a minimum of 2x/week.   The patient will be referred to the [facility] for the osteoporosis " protocol.      Bella Reina MD, CAQ   Sports Medicine and Bone Health

## 2024-05-03 ENCOUNTER — LAB (OUTPATIENT)
Dept: LAB | Facility: OTHER | Age: 59
End: 2024-05-03
Payer: COMMERCIAL

## 2024-05-03 DIAGNOSIS — M81.0 AGE RELATED OSTEOPOROSIS, UNSPECIFIED PATHOLOGICAL FRACTURE PRESENCE: ICD-10-CM

## 2024-05-03 LAB
ALBUMIN SERPL BCG-MCNC: 4.2 G/DL (ref 3.5–5.2)
ALP SERPL-CCNC: 99 U/L (ref 40–150)
ALT SERPL W P-5'-P-CCNC: 19 U/L (ref 0–50)
ANION GAP SERPL CALCULATED.3IONS-SCNC: 14 MMOL/L (ref 7–15)
AST SERPL W P-5'-P-CCNC: 21 U/L (ref 0–45)
BILIRUB SERPL-MCNC: 0.4 MG/DL
BUN SERPL-MCNC: 20.1 MG/DL (ref 8–23)
CALCIUM SERPL-MCNC: 9.8 MG/DL (ref 8.6–10)
CHLORIDE SERPL-SCNC: 104 MMOL/L (ref 98–107)
CREAT SERPL-MCNC: 1.07 MG/DL (ref 0.51–0.95)
DEPRECATED HCO3 PLAS-SCNC: 25 MMOL/L (ref 22–29)
EGFRCR SERPLBLD CKD-EPI 2021: 60 ML/MIN/1.73M2
ERYTHROCYTE [DISTWIDTH] IN BLOOD BY AUTOMATED COUNT: 13.8 % (ref 10–15)
GLUCOSE SERPL-MCNC: 107 MG/DL (ref 70–99)
HCT VFR BLD AUTO: 45.7 % (ref 35–47)
HGB BLD-MCNC: 15.1 G/DL (ref 11.7–15.7)
MAGNESIUM SERPL-MCNC: 2.1 MG/DL (ref 1.7–2.3)
MCH RBC QN AUTO: 30.4 PG (ref 26.5–33)
MCHC RBC AUTO-ENTMCNC: 33 G/DL (ref 31.5–36.5)
MCV RBC AUTO: 92 FL (ref 78–100)
PHOSPHATE SERPL-MCNC: 3.4 MG/DL (ref 2.5–4.5)
PLATELET # BLD AUTO: 173 10E3/UL (ref 150–450)
POTASSIUM SERPL-SCNC: 3.8 MMOL/L (ref 3.4–5.3)
PROT SERPL-MCNC: 7.1 G/DL (ref 6.4–8.3)
RBC # BLD AUTO: 4.97 10E6/UL (ref 3.8–5.2)
SODIUM SERPL-SCNC: 143 MMOL/L (ref 135–145)
TSH SERPL DL<=0.005 MIU/L-ACNC: 1.1 UIU/ML (ref 0.3–4.2)
WBC # BLD AUTO: 5 10E3/UL (ref 4–11)

## 2024-05-03 PROCEDURE — 80053 COMPREHEN METABOLIC PANEL: CPT

## 2024-05-03 PROCEDURE — 83001 ASSAY OF GONADOTROPIN (FSH): CPT

## 2024-05-03 PROCEDURE — 36415 COLL VENOUS BLD VENIPUNCTURE: CPT

## 2024-05-03 PROCEDURE — 83970 ASSAY OF PARATHORMONE: CPT

## 2024-05-03 PROCEDURE — 85027 COMPLETE CBC AUTOMATED: CPT

## 2024-05-03 PROCEDURE — 83002 ASSAY OF GONADOTROPIN (LH): CPT

## 2024-05-03 PROCEDURE — 84443 ASSAY THYROID STIM HORMONE: CPT

## 2024-05-03 PROCEDURE — 83735 ASSAY OF MAGNESIUM: CPT

## 2024-05-03 PROCEDURE — 84100 ASSAY OF PHOSPHORUS: CPT

## 2024-05-04 LAB
FSH SERPL IRP2-ACNC: 57.4 MIU/ML
LH SERPL-ACNC: 36 MIU/ML
PTH-INTACT SERPL-MCNC: 29 PG/ML (ref 15–65)

## 2024-05-21 ENCOUNTER — TELEPHONE (OUTPATIENT)
Dept: NEUROSURGERY | Facility: CLINIC | Age: 59
End: 2024-05-21
Payer: COMMERCIAL

## 2024-05-21 NOTE — TELEPHONE ENCOUNTER
Writer faxed PT order to [phone]. Sent patient [employer] message.     Kiera Majano LPN  Neurosurgery

## 2024-05-21 NOTE — TELEPHONE ENCOUNTER
Other: Britt Mueller called would like for PT refer sent to [place] Fax number: [phone]     Could we [facility] or would you prefer to receive a phone call?:   Patient would prefer a phone call   Okay to leave a detailed message?: No at Other phone number:  [phone]

## 2024-05-30 ENCOUNTER — PATIENT OUTREACH (OUTPATIENT)
Dept: CARE COORDINATION | Facility: CLINIC | Age: 59
End: 2024-05-30
Payer: COMMERCIAL

## 2024-06-07 ENCOUNTER — VIRTUAL VISIT (OUTPATIENT)
Dept: ORTHOPEDICS | Facility: CLINIC | Age: 59
End: 2024-06-07
Payer: COMMERCIAL

## 2024-06-07 DIAGNOSIS — S32.010D COMPRESSION FRACTURE OF L1 VERTEBRA WITH ROUTINE HEALING, SUBSEQUENT ENCOUNTER: Primary | ICD-10-CM

## 2024-06-07 DIAGNOSIS — M81.0 AGE RELATED OSTEOPOROSIS, UNSPECIFIED PATHOLOGICAL FRACTURE PRESENCE: ICD-10-CM

## 2024-06-07 PROCEDURE — 99442 PR PHYSICIAN TELEPHONE EVALUATION 11-20 MIN: CPT | Mod: 93 | Performed by: FAMILY MEDICINE

## 2024-06-07 RX ORDER — TERIPARATIDE 250 UG/ML
20 INJECTION, SOLUTION SUBCUTANEOUS DAILY
Qty: 2.4 ML | Refills: 11 | Status: SHIPPED | OUTPATIENT
Start: 2024-06-07

## 2024-06-07 NOTE — PROGRESS NOTES
Antoinette is a 58 year old who is being evaluated via a billable telephone visit.    What phone number would you like to be contacted at? [phone]  How would you like to obtain your AVS? [place]  Originating Location (pt. Location): Home    Distant Location (provider location):  On-site    Assessment & Plan     Age related osteoporosis, unspecified pathological fracture presence  Will determine cost of Forteo    - teriparatide, recombinant, (FORTEO) 600 MCG/2.4ML SOPN injection; Inject 0.08 mLs (20 mcg) Subcutaneous daily  - insulin pen needle (32G X 4 MM) 32G X 4 MM miscellaneous; Use Forteo pen needles daily as directed.    Compression fracture of L1 vertebra with routine healing, subsequent encounter    - teriparatide, recombinant, (FORTEO) 600 MCG/2.4ML SOPN injection; Inject 0.08 mLs (20 mcg) Subcutaneous daily  - insulin pen needle (32G X 4 MM) 32G X 4 MM miscellaneous; Use Forteo pen needles daily as directed.                No follow-ups on file.    Subjective   Antoinette is a 58 year old, presenting for the following health issues:  RECHECK (Bone health Follow-up )    HPI     Pt is a 57 yo white female with osteoporosis.  FRAX elevated.  Compression fractures.  Pt with some concerns for side effects and this was discussed.  Left femoral neck T score -2.6    Review of Systems  Constitutional, neuro, ENT, endocrine, pulmonary, cardiac, gastrointestinal, genitourinary, musculoskeletal, integument and psychiatric systems are negative, except as otherwise noted.      Objective           Vitals:  No vitals were obtained today due to virtual visit.    Physical Exam   General: Alert and no distress //Respiratory: No audible wheeze, cough, or shortness of breath // Psychiatric:  Appropriate affect, tone, and pace of words      DXA -  FRAX:  10 year probability of major osteoporotic fracture: 19.4%  10 year probability of hip fracture: 4.1%  The 10 year probability of fracture may be lower than reported if  the  patient has received treatment. FRAX data should be disregarded in  patient's taking bisphosphonates.      Phone call duration: 9 minutes 12 seconds minutes  Signed Electronically by: Bella Reina MD

## 2024-06-07 NOTE — LETTER
6/7/2024       RE: Antoinette Sherwood  [address] [address] [address]     Dear Colleague,    Thank you for referring your patient, Antoinette Sherwood, to the [facility] [place] at [facility]. Please see a copy of my visit note below.    Antoinette is a 58 year old who is being evaluated via a billable telephone visit.    What phone number would you like to be contacted at? [phone]  How would you like to obtain your AVS? MyChart  Originating Location (pt. Location): Home    Distant Location (provider location):  On-site    Assessment & Plan    Age related osteoporosis, unspecified pathological fracture presence  Will determine cost of Forteo    - teriparatide, recombinant, (FORTEO) 600 MCG/2.4ML SOPN injection; Inject 0.08 mLs (20 mcg) Subcutaneous daily  - insulin pen needle (32G X 4 MM) 32G X 4 MM miscellaneous; Use Forteo pen needles daily as directed.    Compression fracture of L1 vertebra with routine healing, subsequent encounter    - teriparatide, recombinant, (FORTEO) 600 MCG/2.4ML SOPN injection; Inject 0.08 mLs (20 mcg) Subcutaneous daily  - insulin pen needle (32G X 4 MM) 32G X 4 MM miscellaneous; Use Forteo pen needles daily as directed.                No follow-ups on file.    Subjective  Antoinette is a 58 year old, presenting for the following health issues:  RECHECK (Bone health Follow-up )    HPI     Pt is a 57 yo white female with osteoporosis.  FRAX elevated.  Compression fractures.  Pt with some concerns for side effects and this was discussed.  Left femoral neck T score -2.6    Review of Systems  Constitutional, neuro, ENT, endocrine, pulmonary, cardiac, gastrointestinal, genitourinary, musculoskeletal, integument and psychiatric systems are negative, except as otherwise noted.      Objective          Vitals:  No vitals were obtained today due to virtual visit.    Physical Exam   General:  Alert and no distress //Respiratory: No audible wheeze, cough, or shortness of breath // Psychiatric:  Appropriate affect, tone, and pace of words      DXA -  FRAX:  10 year probability of major osteoporotic fracture: 19.4%  10 year probability of hip fracture: 4.1%  The 10 year probability of fracture may be lower than reported if the  patient has received treatment. FRAX data should be disregarded in  patient's taking bisphosphonates.      Phone call duration: 9 minutes 12 seconds minutes  Signed Electronically by: Bella Reina MD      Again, thank you for allowing me to participate in the care of your patient.      Sincerely,    Bella Reina MD

## 2024-06-13 ENCOUNTER — PATIENT OUTREACH (OUTPATIENT)
Dept: CARE COORDINATION | Facility: CLINIC | Age: 59
End: 2024-06-13
Payer: COMMERCIAL

## 2024-06-20 ENCOUNTER — MYC MEDICAL ADVICE (OUTPATIENT)
Dept: ORTHOPEDICS | Facility: CLINIC | Age: 59
End: 2024-06-20
Payer: COMMERCIAL

## 2024-06-20 DIAGNOSIS — S32.010D COMPRESSION FRACTURE OF L1 VERTEBRA WITH ROUTINE HEALING, SUBSEQUENT ENCOUNTER: ICD-10-CM

## 2024-06-20 DIAGNOSIS — M81.0 AGE RELATED OSTEOPOROSIS, UNSPECIFIED PATHOLOGICAL FRACTURE PRESENCE: Primary | ICD-10-CM

## 2024-06-25 DIAGNOSIS — G47.00 PERSISTENT INSOMNIA: ICD-10-CM

## 2024-06-25 DIAGNOSIS — I10 BENIGN HYPERTENSION: ICD-10-CM

## 2024-06-26 ENCOUNTER — PATIENT OUTREACH (OUTPATIENT)
Dept: CARE COORDINATION | Facility: CLINIC | Age: 59
End: 2024-06-26
Payer: COMMERCIAL

## 2024-06-26 RX ORDER — TERIPARATIDE 250 UG/ML
20 INJECTION, SOLUTION SUBCUTANEOUS DAILY
Qty: 2.4 ML | Refills: 11 | Status: SHIPPED | OUTPATIENT
Start: 2024-06-26

## 2024-06-26 NOTE — TELEPHONE ENCOUNTER
I sent this Forteo script to [facility] which is part of [facility]    [address], [place]     Per 800#

## 2024-06-28 RX ORDER — HYDROCHLOROTHIAZIDE 25 MG/1
25 TABLET ORAL DAILY
Qty: 90 TABLET | Refills: 1 | Status: SHIPPED | OUTPATIENT
Start: 2024-06-28

## 2024-06-28 RX ORDER — TRAZODONE HYDROCHLORIDE 100 MG/1
100 TABLET ORAL
Qty: 90 TABLET | Refills: 1 | Status: SHIPPED | OUTPATIENT
Start: 2024-06-28 | End: 2024-08-26

## 2024-07-02 NOTE — RESULT ENCOUNTER NOTE
Please advise Antoinette Sherwood,  1965, that her mammogram results were slight asymmetry left upper breast radiologist recommending ultra sound of left breast please let me know if she would like an order for this.   [phone] (home) [phone] (work)  Thank you  Sherry Yepez CNP     Pt calling in. 2 weeks ago went out in the sun, and ended up getting really sick, felt ill, sore joints, weak muscles, slight rash on the face. Thursday started getting more pain and in more joints.     Has been taking Aleve and helps somewhat but still painful and uncomfortable.      One of the main symptoms that she has been struggling a bit has been pain in the joints.     More frequent low BP episodes. Gabapentin hasn't really made a difference.    Pt has been struggling to get off low leveled areas because she is having generalized weakness and pain in the body.     Steroids in the past have been helpful, but with hx of diabetes it raises her glucose.     She hasn't tried any home care remedies, recommended rest, heat/cold therapy to joints, topical pain relief cream, hydration.     Pt asks if medication is called in to send to [facility] on file.     No appt available until late August. Please advise.

## 2024-07-07 ENCOUNTER — TELEPHONE (OUTPATIENT)
Dept: FAMILY MEDICINE | Facility: CLINIC | Age: 59
End: 2024-07-07
Payer: COMMERCIAL

## 2024-07-07 DIAGNOSIS — F32.0 MILD MAJOR DEPRESSION (H): Chronic | ICD-10-CM

## 2024-07-07 DIAGNOSIS — I10 BENIGN HYPERTENSION: ICD-10-CM

## 2024-07-11 RX ORDER — SERTRALINE HYDROCHLORIDE 100 MG/1
100 TABLET, FILM COATED ORAL DAILY
Qty: 30 TABLET | Refills: 0 | Status: SHIPPED | OUTPATIENT
Start: 2024-07-11 | End: 2024-07-17 | Stop reason: DRUGHIGH

## 2024-07-11 RX ORDER — LOSARTAN POTASSIUM 25 MG/1
25 TABLET ORAL DAILY
Qty: 30 TABLET | Refills: 0 | Status: SHIPPED | OUTPATIENT
Start: 2024-07-11 | End: 2024-08-05

## 2024-07-11 RX ORDER — BUPROPION HYDROCHLORIDE 150 MG/1
150 TABLET ORAL EVERY MORNING
Qty: 30 TABLET | Refills: 0 | Status: SHIPPED | OUTPATIENT
Start: 2024-07-11 | End: 2024-07-17

## 2024-07-11 NOTE — TELEPHONE ENCOUNTER
Due for visit- face to face for annual exam  Refilling 30 days.   Please schedule  Sugar Hollingsworth PA-C

## 2024-07-12 NOTE — TELEPHONE ENCOUNTER
Message read on 7/11/2024  7:29 PM by Antoinette Sherwood; closing.    Opal Correa CMA ([facility])

## 2024-07-16 ASSESSMENT — PATIENT HEALTH QUESTIONNAIRE - PHQ9
SUM OF ALL RESPONSES TO PHQ QUESTIONS 1-9: 12
SUM OF ALL RESPONSES TO PHQ QUESTIONS 1-9: 12
10. IF YOU CHECKED OFF ANY PROBLEMS, HOW DIFFICULT HAVE THESE PROBLEMS MADE IT FOR YOU TO DO YOUR WORK, TAKE CARE OF THINGS AT HOME, OR GET ALONG WITH OTHER PEOPLE: SOMEWHAT DIFFICULT

## 2024-07-17 ENCOUNTER — VIRTUAL VISIT (OUTPATIENT)
Dept: FAMILY MEDICINE | Facility: CLINIC | Age: 59
End: 2024-07-17
Payer: COMMERCIAL

## 2024-07-17 DIAGNOSIS — F32.0 MILD MAJOR DEPRESSION (H): Primary | ICD-10-CM

## 2024-07-17 PROCEDURE — 99214 OFFICE O/P EST MOD 30 MIN: CPT | Mod: 95 | Performed by: PHYSICIAN ASSISTANT

## 2024-07-17 RX ORDER — SERTRALINE HYDROCHLORIDE 100 MG/1
150 TABLET, FILM COATED ORAL DAILY
Qty: 135 TABLET | Refills: 0 | Status: SHIPPED | OUTPATIENT
Start: 2024-07-17 | End: 2024-08-26

## 2024-07-17 RX ORDER — BUPROPION HYDROCHLORIDE 150 MG/1
150 TABLET ORAL EVERY MORNING
Qty: 90 TABLET | Refills: 0 | Status: SHIPPED | OUTPATIENT
Start: 2024-07-17 | End: 2024-08-26

## 2024-07-17 ASSESSMENT — ANXIETY QUESTIONNAIRES
GAD7 TOTAL SCORE: 2
2. NOT BEING ABLE TO STOP OR CONTROL WORRYING: NOT AT ALL
GAD7 TOTAL SCORE: 2
7. FEELING AFRAID AS IF SOMETHING AWFUL MIGHT HAPPEN: NOT AT ALL
IF YOU CHECKED OFF ANY PROBLEMS ON THIS QUESTIONNAIRE, HOW DIFFICULT HAVE THESE PROBLEMS MADE IT FOR YOU TO DO YOUR WORK, TAKE CARE OF THINGS AT HOME, OR GET ALONG WITH OTHER PEOPLE: NOT DIFFICULT AT ALL
3. WORRYING TOO MUCH ABOUT DIFFERENT THINGS: NOT AT ALL
4. TROUBLE RELAXING: NOT AT ALL
5. BEING SO RESTLESS THAT IT IS HARD TO SIT STILL: NOT AT ALL
8. IF YOU CHECKED OFF ANY PROBLEMS, HOW DIFFICULT HAVE THESE MADE IT FOR YOU TO DO YOUR WORK, TAKE CARE OF THINGS AT HOME, OR GET ALONG WITH OTHER PEOPLE?: NOT DIFFICULT AT ALL
7. FEELING AFRAID AS IF SOMETHING AWFUL MIGHT HAPPEN: NOT AT ALL
6. BECOMING EASILY ANNOYED OR IRRITABLE: MORE THAN HALF THE DAYS
1. FEELING NERVOUS, ANXIOUS, OR ON EDGE: NOT AT ALL

## 2024-07-17 NOTE — PROGRESS NOTES
Antoinette is a 58 year old who is being evaluated via a billable video visit.    How would you like to obtain your AVS? MyChart  If the video visit is dropped, the invitation should be resent by: Text to cell phone: [phone]  Will anyone else be joining your video visit? No      Assessment & Plan     Mild major depression (H24)  Increase sertraline from 100mg to 150mg daily  Cont on wellbutrin  Using trazodone for sleep  Self cares  Ref to counseling.   Recheck at annual visit end of August in ~ 6 weeks.   - sertraline (ZOLOFT) 100 MG tablet; Take 1.5 tablets (150 mg) by mouth daily  - buPROPion (WELLBUTRIN XL) 150 MG 24 hr tablet; Take 1 tablet (150 mg) by mouth every morning            Follow Up: see above. Additionally patient was instructed to contact clinic for worsening symptoms, non-improvement in time frame discussed, and for questions regarding treatment plan.   For virtual visits, the patient was advised to be seen for in person evaluation if symptoms or condition are worsening or non-improvement as expected.   Sugar Hollingsworth PA-C    Subjective   Antoinette is a 58 year old, presenting for the following health issues:  Recheck Medication and MH Follow Up  - Bupropion & sertraline      7/17/2024     7:24 AM   Additional Questions   Roomed by Sidney COYLE   Accompanied by Self     History of Present Illness       Mental Health Follow-up:  Patient presents to follow-up on Depression.Patient's depression since last visit has been:  Worse  The patient is having other symptoms associated with depression.      Any significant life events: relationship concerns  Patient is not feeling anxious or having panic attacks.  Patient has no concerns about alcohol or drug use.    She eats 2-3 servings of fruits and vegetables daily.She consumes 1 sweetened beverage(s) daily.She exercises with enough effort to increase her heart rate 10 to 19 minutes per day.  She exercises with enough effort to increase her heart rate 3  "or less days per week.   She is taking medications regularly.     Back - pain is better. Done with PT.   Osteoporosis- started forteo through .     Mental health- zoloft 100mg, wellbutrin XL 150mg and trazodone 100mg  Has been on this combination a long time  Feels it works well for her.   Currently- last several weeks (6-7 weeks ago), lost interest in doing things, less energy, easily irritable.  I feel withdrawn. I don't get excited like I used to about things I enjoy.  Physical health issues as above  Relationship issues- we are opposites, we don't do a lot together.   Mood- \"blah\"  Sleep- ok. 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  Had ER visit Nov 2023 for mental health concerns        6/29/2023    10:40 AM 2/21/2024     2:13 PM 7/16/2024     8:21 PM   PHQ   PHQ-9 Total Score 1 0 12   Q9: Thoughts of better off dead/self-harm past 2 weeks Not at all Not at all Not at all         9/13/2019     1:15 PM 2/17/2021     8:56 AM 7/17/2024     7:21 AM   LESLIE-7 SCORE   Total Score 0 (minimal anxiety) 0 (minimal anxiety) 2 (minimal anxiety)   Total Score 0 0 2           Review of Systems  Constitutional, HEENT, cardiovascular, pulmonary, gi and gu systems are negative, except as otherwise noted.      Objective           Vitals:  No vitals were obtained today due to virtual visit.    Physical Exam   GENERAL: alert and no distress  EYES: Eyes grossly normal to inspection.  No discharge or erythema, or obvious scleral/conjunctival abnormalities.  HENT: Normal cephalic/atraumatic.  External ears, nose and mouth without ulcers or lesions.  No nasal drainage visible.  NECK: No asymmetry, visible masses or scars  RESP: No audible wheeze, cough, or visible cyanosis.    SKIN: Visible skin clear. No significant rash, abnormal " pigmentation or lesions.  NEURO: Cranial nerves grossly intact.  Mentation and speech appropriate for age.  PSYCH: Appropriate affect, tone, and pace of words          Video-Visit Details    Type of service:  Video Visit   Originating Location (pt. Location): Home    Distant Location (provider location):  On-site  Platform used for Video Visit: Maya  Signed Electronically by: Sugar Hollingsworth PA-C

## 2024-07-24 ENCOUNTER — PATIENT OUTREACH (OUTPATIENT)
Dept: CARE COORDINATION | Facility: CLINIC | Age: 59
End: 2024-07-24
Payer: COMMERCIAL

## 2024-08-04 ENCOUNTER — HEALTH MAINTENANCE LETTER (OUTPATIENT)
Age: 59
End: 2024-08-04

## 2024-08-05 DIAGNOSIS — I10 BENIGN HYPERTENSION: ICD-10-CM

## 2024-08-05 RX ORDER — LOSARTAN POTASSIUM 25 MG/1
25 TABLET ORAL DAILY
Qty: 90 TABLET | Refills: 0 | Status: SHIPPED | OUTPATIENT
Start: 2024-08-05

## 2024-08-08 ENCOUNTER — TELEPHONE (OUTPATIENT)
Dept: FAMILY MEDICINE | Facility: CLINIC | Age: 59
End: 2024-08-08
Payer: COMMERCIAL

## 2024-08-08 NOTE — TELEPHONE ENCOUNTER
Patient Quality Outreach    Patient is due for the following:   Breast Cancer Screening - Mammogram  Physical Preventive Adult Physical  Hypertension: BP check      Topic Date Due    Hepatitis B Vaccine (1 of 3 - 19+ 3-dose series) Never done    COVID-19 Vaccine (4 - 2023-24 season) 09/01/2023     Chronic Opioid Use -  Treatment Agreement (CSA) and Urine Drug Screen    Next Steps:   Patient has upcoming appointment, these items will be addressed at that time.    Type of outreach:    Chart review performed, no outreach needed.      Questions for provider review:    Update CSA and order/complete UDS at next scheduled visit.           Opal Correa, CMA

## 2024-08-25 SDOH — HEALTH STABILITY: PHYSICAL HEALTH: ON AVERAGE, HOW MANY DAYS PER WEEK DO YOU ENGAGE IN MODERATE TO STRENUOUS EXERCISE (LIKE A BRISK WALK)?: 2 DAYS

## 2024-08-25 SDOH — HEALTH STABILITY: PHYSICAL HEALTH: ON AVERAGE, HOW MANY MINUTES DO YOU ENGAGE IN EXERCISE AT THIS LEVEL?: 30 MIN

## 2024-08-25 ASSESSMENT — PAIN SCALES - PAIN ENJOYMENT GENERAL ACTIVITY SCALE (PEG)
INTERFERED_ENJOYMENT_LIFE: 0 - DOES NOT INTERFERE
PEG_TOTALSCORE: 0.33
INTERFERED_GENERAL_ACTIVITY: 0 - DOES NOT INTERFERE
AVG_PAIN_PASTWEEK: 1
AVG_PAIN_PASTWEEK: 1
PEG_TOTALSCORE: .33
INTERFERED_GENERAL_ACTIVITY: 0
INTERFERED_ENJOYMENT_LIFE: 0

## 2024-08-25 ASSESSMENT — SOCIAL DETERMINANTS OF HEALTH (SDOH): HOW OFTEN DO YOU GET TOGETHER WITH FRIENDS OR RELATIVES?: TWICE A WEEK

## 2024-08-26 ENCOUNTER — OFFICE VISIT (OUTPATIENT)
Dept: FAMILY MEDICINE | Facility: CLINIC | Age: 59
End: 2024-08-26
Payer: COMMERCIAL

## 2024-08-26 VITALS
TEMPERATURE: 99.3 F | HEART RATE: 54 BPM | WEIGHT: 181 LBS | BODY MASS INDEX: 29.09 KG/M2 | RESPIRATION RATE: 12 BRPM | OXYGEN SATURATION: 96 % | SYSTOLIC BLOOD PRESSURE: 136 MMHG | HEIGHT: 66 IN | DIASTOLIC BLOOD PRESSURE: 74 MMHG

## 2024-08-26 DIAGNOSIS — G47.00 PERSISTENT INSOMNIA: ICD-10-CM

## 2024-08-26 DIAGNOSIS — Z00.00 ROUTINE GENERAL MEDICAL EXAMINATION AT A HEALTH CARE FACILITY: Primary | ICD-10-CM

## 2024-08-26 DIAGNOSIS — I49.3 FREQUENT PVCS: ICD-10-CM

## 2024-08-26 DIAGNOSIS — Z13.6 ENCOUNTER FOR SCREENING FOR CORONARY ARTERY DISEASE: ICD-10-CM

## 2024-08-26 DIAGNOSIS — G43.109 MIGRAINE WITH AURA AND WITHOUT STATUS MIGRAINOSUS, NOT INTRACTABLE: Chronic | ICD-10-CM

## 2024-08-26 DIAGNOSIS — F32.0 MILD MAJOR DEPRESSION (H): ICD-10-CM

## 2024-08-26 DIAGNOSIS — S32.010D COMPRESSION FRACTURE OF L1 VERTEBRA WITH ROUTINE HEALING, SUBSEQUENT ENCOUNTER: ICD-10-CM

## 2024-08-26 LAB
ALBUMIN SERPL BCG-MCNC: 4.4 G/DL (ref 3.5–5.2)
ALP SERPL-CCNC: 87 U/L (ref 40–150)
ALT SERPL W P-5'-P-CCNC: 9 U/L (ref 0–50)
ANION GAP SERPL CALCULATED.3IONS-SCNC: 10 MMOL/L (ref 7–15)
AST SERPL W P-5'-P-CCNC: 23 U/L (ref 0–45)
BILIRUB SERPL-MCNC: 0.6 MG/DL
BUN SERPL-MCNC: 20.2 MG/DL (ref 8–23)
CALCIUM SERPL-MCNC: 9.7 MG/DL (ref 8.8–10.4)
CHLORIDE SERPL-SCNC: 103 MMOL/L (ref 98–107)
CHOLEST SERPL-MCNC: 247 MG/DL
CREAT SERPL-MCNC: 1.06 MG/DL (ref 0.51–0.95)
EGFRCR SERPLBLD CKD-EPI 2021: 60 ML/MIN/1.73M2
FASTING STATUS PATIENT QL REPORTED: YES
FASTING STATUS PATIENT QL REPORTED: YES
GLUCOSE SERPL-MCNC: 104 MG/DL (ref 70–99)
HBA1C MFR BLD: 5.2 % (ref 0–5.6)
HCO3 SERPL-SCNC: 29 MMOL/L (ref 22–29)
HDLC SERPL-MCNC: 62 MG/DL
LDLC SERPL CALC-MCNC: 162 MG/DL
NONHDLC SERPL-MCNC: 185 MG/DL
POTASSIUM SERPL-SCNC: 4.5 MMOL/L (ref 3.4–5.3)
PROT SERPL-MCNC: 7.4 G/DL (ref 6.4–8.3)
SODIUM SERPL-SCNC: 142 MMOL/L (ref 135–145)
TRIGL SERPL-MCNC: 113 MG/DL

## 2024-08-26 PROCEDURE — 36415 COLL VENOUS BLD VENIPUNCTURE: CPT | Performed by: PHYSICIAN ASSISTANT

## 2024-08-26 PROCEDURE — 99214 OFFICE O/P EST MOD 30 MIN: CPT | Mod: 25 | Performed by: PHYSICIAN ASSISTANT

## 2024-08-26 PROCEDURE — 99396 PREV VISIT EST AGE 40-64: CPT | Performed by: PHYSICIAN ASSISTANT

## 2024-08-26 PROCEDURE — 80061 LIPID PANEL: CPT | Performed by: PHYSICIAN ASSISTANT

## 2024-08-26 PROCEDURE — 80053 COMPREHEN METABOLIC PANEL: CPT | Performed by: PHYSICIAN ASSISTANT

## 2024-08-26 PROCEDURE — 83036 HEMOGLOBIN GLYCOSYLATED A1C: CPT | Performed by: PHYSICIAN ASSISTANT

## 2024-08-26 RX ORDER — BUPROPION HYDROCHLORIDE 150 MG/1
150 TABLET ORAL EVERY MORNING
Qty: 90 TABLET | Refills: 3 | Status: SHIPPED | OUTPATIENT
Start: 2024-08-26

## 2024-08-26 RX ORDER — METHOCARBAMOL 500 MG/1
500-1000 TABLET, FILM COATED ORAL 4 TIMES DAILY PRN
Qty: 150 TABLET | Refills: 0 | Status: CANCELLED | OUTPATIENT
Start: 2024-08-26

## 2024-08-26 RX ORDER — METOPROLOL SUCCINATE 25 MG/1
25 TABLET, EXTENDED RELEASE ORAL DAILY
Qty: 90 TABLET | Refills: 3 | Status: SHIPPED | OUTPATIENT
Start: 2024-08-26

## 2024-08-26 RX ORDER — SERTRALINE HYDROCHLORIDE 100 MG/1
150 TABLET, FILM COATED ORAL DAILY
Qty: 135 TABLET | Refills: 3 | Status: SHIPPED | OUTPATIENT
Start: 2024-08-26

## 2024-08-26 RX ORDER — TRAZODONE HYDROCHLORIDE 100 MG/1
100 TABLET ORAL
Qty: 90 TABLET | Refills: 3 | Status: SHIPPED | OUTPATIENT
Start: 2024-08-26

## 2024-08-26 RX ORDER — RIZATRIPTAN BENZOATE 10 MG/1
10 TABLET ORAL
Qty: 9 TABLET | Refills: 1 | Status: SHIPPED | OUTPATIENT
Start: 2024-08-26

## 2024-08-26 RX ORDER — OXYCODONE HYDROCHLORIDE 5 MG/1
5 TABLET ORAL DAILY
Qty: 20 TABLET | Refills: 0 | Status: CANCELLED | OUTPATIENT
Start: 2024-08-26

## 2024-08-26 ASSESSMENT — ANXIETY QUESTIONNAIRES
3. WORRYING TOO MUCH ABOUT DIFFERENT THINGS: NOT AT ALL
GAD7 TOTAL SCORE: 0
GAD7 TOTAL SCORE: 0
7. FEELING AFRAID AS IF SOMETHING AWFUL MIGHT HAPPEN: NOT AT ALL
6. BECOMING EASILY ANNOYED OR IRRITABLE: NOT AT ALL
1. FEELING NERVOUS, ANXIOUS, OR ON EDGE: NOT AT ALL
8. IF YOU CHECKED OFF ANY PROBLEMS, HOW DIFFICULT HAVE THESE MADE IT FOR YOU TO DO YOUR WORK, TAKE CARE OF THINGS AT HOME, OR GET ALONG WITH OTHER PEOPLE?: NOT DIFFICULT AT ALL
4. TROUBLE RELAXING: NOT AT ALL
2. NOT BEING ABLE TO STOP OR CONTROL WORRYING: NOT AT ALL
7. FEELING AFRAID AS IF SOMETHING AWFUL MIGHT HAPPEN: NOT AT ALL
IF YOU CHECKED OFF ANY PROBLEMS ON THIS QUESTIONNAIRE, HOW DIFFICULT HAVE THESE PROBLEMS MADE IT FOR YOU TO DO YOUR WORK, TAKE CARE OF THINGS AT HOME, OR GET ALONG WITH OTHER PEOPLE: NOT DIFFICULT AT ALL
5. BEING SO RESTLESS THAT IT IS HARD TO SIT STILL: NOT AT ALL
GAD7 TOTAL SCORE: 0

## 2024-08-26 ASSESSMENT — PAIN SCALES - GENERAL: PAINLEVEL: NO PAIN (0)

## 2024-08-26 NOTE — PROGRESS NOTES
Preventive Care Visit  [facility] ANTONIA Hollingsworth PA-C, Family Medicine  Aug 26, 2024      Assessment & Plan     Routine general medical examination at a health care facility  Reviewed VS, weight/BMI   Routine screenings see orders  Immunizations: see orders and documentation in HPI. Discussed vaccines coverage as pharmacy benefit.  Health and cancer screening recommendations delivered according to the USPSTF and other appropriate society guidelines  Nutrition and exercise counseling performed.  Due for mammogram. Order in chart already  - Comprehensive metabolic panel; Future  - Lipid panel reflex to direct LDL Fasting; Future  - Hemoglobin A1c; Future  - Comprehensive metabolic panel  - Lipid panel reflex to direct LDL Fasting  - Hemoglobin A1c    Mild major depression (H24)  Stable, sx well controlled, tolerates medication(s) without side effects. Refilled x 1yr  - buPROPion (WELLBUTRIN XL) 150 MG 24 hr tablet; Take 1 tablet (150 mg) by mouth every morning.  - sertraline (ZOLOFT) 100 MG tablet; Take 1.5 tablets (150 mg) by mouth daily.    Compression fracture of L1 vertebra with routine healing, subsequent encounter  Her pain has improved. Treats with ibuprofen 400mg twice weekly. No longer needing muscle relaxer or oxycodone.   Cont w/neurosurgery as needed/scheduled and sports medicine for osteoporosis management.     Migraine with aura and without status migrainosus, not intractable  Stable pattern. Having 4-5 migraines per year. 1 dose maxalt is effective. No side effects. Refilled.   - rizatriptan (MAXALT) 10 MG tablet; Take 1 tablet (10 mg) by mouth at onset of headache for migraine (may repeat dose in 2 hour if needed).    Frequent PVCs  Encounter for screening for coronary artery disease  Reviewed cardiology consult notes from Dr.Elizabeth Morfin.   Pt does not plan to see cardiology back. Refilled the metoprolol  She has not yet done the coronary calcium CT that was advised, she  "prefers we order in primary care. Order placed, would ref back to cardiology if needed based on results.   - CT Coronary Calcium Scan; Future  - metoprolol succinate ER (TOPROL XL) 25 MG 24 hr tablet; Take 1 tablet (25 mg) by mouth daily.    Persistent insomnia  Stable, sx well controlled, tolerates medication(s) without side effects. Refilled x 1yr  - traZODone (DESYREL) 100 MG tablet; Take 1 tablet (100 mg) by mouth nightly as needed for sleep.    Patient has been advised of split billing requirements and indicates understanding: Yes        BMI  Estimated body mass index is 29.23 kg/m  as calculated from the following:    Height as of this encounter: 1.676 m (5' 5.98\").    Weight as of this encounter: 82.1 kg (181 lb).   Weight management plan: Discussed healthy diet and exercise guidelines    Counseling  Appropriate preventive services were addressed with this patient via screening, questionnaire, or discussion as appropriate for fall prevention, nutrition, physical activity, Tobacco-use cessation, social engagement, weight loss and cognition.  Checklist reviewing preventive services available has been given to the patient.  Reviewed patient's diet, addressing concerns and/or questions.   She is at risk for lack of exercise and has been provided with information to increase physical activity for the benefit of her well-being.       Follow Up: see above. Additionally patient was instructed to contact clinic for worsening symptoms, non-improvement in time frame discussed, and for questions regarding treatment plan.   For virtual visits, the patient was advised to be seen for in person evaluation if symptoms or condition are worsening or non-improvement as expected.   Sugar Hollingsworth PA-C    Estelle Curry is a 58 year old, presenting for the following:  Physical        8/26/2024     9:44 AM   Additional Questions   Roomed by Opal Correa CMA   Accompanied by self         8/26/2024     9:44 AM   Patient " Reported Additional Medications   Patient reports taking the following new medications none        Health Care Directive  Patient does not have a Health Care Directive or Living Will: Discussed advance care planning with patient; however, patient declined at this time.    HPI  Routine Health Maintenance:  Immunizations UTD .   Mammogram: 07/2023  Colonoscopy: 12/14/21. Repeat in 5 yr    Fasting: yes     GYN Hx:  s/p hyst- pap no longer indicated (DUB) . Has ovaries. No hot flashes.   Denies GYN concerns of PCB, pelvic pain, dyspareunia, vaginal discharge  Sexually active: yes.   STD screening: no     Compression fracture of L1 vertebra with routine healing- saw neurosurgery and also sports med for osteoporosis management- Forteo   Pain has gotten better.   Times where a little achy. Ibuprofen alleviates when needed - 400mg twice weekly.   Has not needed muscle relaxer   No longer needing the oxycodone.     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  No side effects.  Nonsmoker.     Last year saw Cardiologist Dr. Merry Morfin for ESTEVEZ. Had stress testing. Showed frequent PVCs. Was started on  metoprolol XL 25mg. She did notice palpitations sometimes. Those seem to be less now. The ESTEVEZ is better a little bit. She did do a ZIO. Did not yet do the coronary calcium score CT. She does not plan to see the cardiologist back again.     Mental health- zoloft 150mg, wellbutrin XL 150mg and trazodone 100mg  Has been on this combination a long time  Feels it works well for her. Stable. wants refills. No side effects.   Sleep- good.   Mood- good.   Just got son off to basic training with the [employer]. Something he has always wanted to do.     Migraines: current meds:  maxalt  Stable: Yes   Frequency: less than monthly. 4x a year.  1 dose of maxalt works.   Never missing work due to migraines.  Pain pattern: stable. +nausea. Aura w/migraine (every time).    Triggers: unsure.    Prior preventive therapy: never needed.        2/21/2024     2:13 PM 7/16/2024     8:21 PM 8/25/2024    10:16 AM   PHQ   PHQ-9 Total Score 0 12 1   Q9: Thoughts of better off dead/self-harm past 2 weeks Not at all Not at all Not at all         2/17/2021     8:56 AM 7/17/2024     7:21 AM 8/26/2024     9:41 AM   LESLIE-7 SCORE   Total Score 0 (minimal anxiety) 2 (minimal anxiety) 0 (minimal anxiety)   Total Score 0 2 0               8/25/2024   General Health   How would you rate your overall physical health? (!) FAIR   Feel stress (tense, anxious, or unable to sleep) Not at all            8/25/2024   Nutrition   Three or more servings of calcium each day? Yes   Diet: Low salt    Low fat/cholesterol   How many servings of fruit and vegetables per day? (!) 2-3   How many sweetened beverages each day? 0-1       Multiple values from one day are sorted in reverse-chronological order         8/25/2024   Exercise   Days per week of moderate/strenous exercise 2 days   Average minutes spent exercising at this level 30 min      (!) EXERCISE CONCERN      8/25/2024   Social Factors   Frequency of gathering with friends or relatives Twice a week   Worry food won't last until get money to buy more No   Food not last or not have enough money for food? No   Do you have housing? (Housing is defined as stable permanent housing and does not include staying ouside in a car, in a tent, in an abandoned building, in an overnight shelter, or couch-surfing.) Yes   Are you worried about losing your housing? No   Lack of transportation? No   Unable to get utilities (heat,electricity)? No            8/25/2024   Fall Risk   Fallen 2 or more times in the past year? No   Trouble with walking or balance? No             8/25/2024   Dental   Dentist two times every year? Yes            8/25/2024   TB Screening   Were you born outside of the [place]? No          Today's PHQ-9 Score:       8/25/2024    10:16 AM   PHQ-9 SCORE   PHQ-9  Total Score MyChart 1 (Minimal depression)   PHQ-9 Total Score 1         8/25/2024   Substance Use   Alcohol more than 3/day or more than 7/wk No   Do you use any other substances recreationally? No        Social History     Tobacco Use    Smoking status: Never     Passive exposure: Never    Smokeless tobacco: Never   Vaping Use    Vaping status: Never Used   Substance Use Topics    Alcohol use: Yes     Alcohol/week: 5.0 standard drinks of alcohol     Types: 5 Standard drinks or equivalent per week     Comment: minimal    Drug use: No           7/26/2023   LAST FHS-7 RESULTS   1st degree relative breast or ovarian cancer No   Any relative bilateral breast cancer No   Any male have breast cancer No   Any ONE woman have BOTH breast AND ovarian cancer No   Any woman with breast cancer before 50yrs No   2 or more relatives with breast AND/OR ovarian cancer No   2 or more relatives with breast AND/OR bowel cancer No           Mammogram Screening - Mammogram every 1-2 years updated in Health Maintenance based on mutual decision making        8/25/2024   STI Screening   New sexual partner(s) since last STI/HIV test? No        History of abnormal Pap smear: Status post hysterectomy with removal of cervix and no history of CIN2 or greater or cervical cancer. Health Maintenance and Surgical History updated.        10/29/2008    12:00 AM 7/11/2005    12:00 AM   PAP / HPV   PAP (Historical) NIL  OTHER-NIL EM>40      ASCVD Risk   The 10-year ASCVD risk score (Naun ARREAGA, et al., 2019) is: 4.7%    Values used to calculate the score:      Age: 58 years      Sex: Female      Is Non- : No      Diabetic: No      Tobacco smoker: No      Systolic Blood Pressure: 136 mmHg      Is BP treated: Yes      HDL Cholesterol: 61 mg/dL      Total Cholesterol: 232 mg/dL           Reviewed and updated as needed this visit by Provider                    Past Medical History:   Diagnosis Date    Anxiety     Calculus of  kidney     Abstracted 07/01/02    Depressive disorder     Hypertension      Past Surgical History:   Procedure Laterality Date    CL AFF SURGICAL PATHOLOGY  01/01/2006    breast reduction    COLONOSCOPY      COSMETIC SURGERY      ENT SURGERY      EXCISE LESION TRUNK  11/21/2012    Procedure: EXCISE LESION TRUNK;  Excision back mass;  Surgeon: Saqib Abraham MD;  Location: [facility] OR    HC CORRECT BUNION,METATARSAL OSTEOTOMY  12/03/2009    right great toe    HC EXCISION LESION/TENDON-SHEATH/CAPSULE, FOOT  12/03/2009    HC REMV TARSAL/METATARSAL BENIGN BONE LESN  12/03/2009    HYSTERECTOMY, PAP NO LONGER INDICATED      HYSTERECTOMY, VAGINAL  01/01/2005    menorrhagia, normal paps    LAPAROSCOPIC CHOLECYSTECTOMY  01/01/2001    LITHOTRIPSY  01/01/2007    right kidney    TUBAL LIGATION  01/01/2001    ZZC NONSPECIFIC PROCEDURE  08/01/1996    Left salpingectomy   Abstracted 07/01/02     Lab work is in process  Labs reviewed in EPIC  BP Readings from Last 3 Encounters:   08/26/24 136/74   05/01/24 (!) 145/84   04/08/24 127/84    Wt Readings from Last 3 Encounters:   08/26/24 82.1 kg (181 lb)   05/01/24 83.9 kg (185 lb)   04/08/24 86.8 kg (191 lb 4 oz)                  Patient Active Problem List   Diagnosis    CARDIOVASCULAR SCREENING; LDL GOAL LESS THAN 160    Mild major depression (H24)    Benign hypertension    Pain of left lower leg    Migraine with aura and without status migrainosus, not intractable    H/O coccidioidomycosis    Trochanteric bursitis of right hip    Gastroesophageal reflux disease without esophagitis    Impaired fasting glucose     Past Surgical History:   Procedure Laterality Date    CL AFF SURGICAL PATHOLOGY  01/01/2006    breast reduction    COLONOSCOPY      COSMETIC SURGERY      ENT SURGERY      EXCISE LESION TRUNK  11/21/2012    Procedure: EXCISE LESION TRUNK;  Excision back mass;  Surgeon: Saqib Abraham MD;  Location: [facility] OR    HC CORRECT BUNION,METATARSAL OSTEOTOMY  12/03/2009    right great  toe    HC EXCISION LESION/TENDON-SHEATH/CAPSULE, FOOT  12/03/2009    HC REMV TARSAL/METATARSAL BENIGN BONE LESN  12/03/2009    HYSTERECTOMY, PAP NO LONGER INDICATED      HYSTERECTOMY, VAGINAL  01/01/2005    menorrhagia, normal paps    LAPAROSCOPIC CHOLECYSTECTOMY  01/01/2001    LITHOTRIPSY  01/01/2007    right kidney    TUBAL LIGATION  01/01/2001    ZZC NONSPECIFIC PROCEDURE  08/01/1996    Left salpingectomy   Abstracted 07/01/02       Social History     Tobacco Use    Smoking status: Never     Passive exposure: Never    Smokeless tobacco: Never   Substance Use Topics    Alcohol use: Yes     Alcohol/week: 5.0 standard drinks of alcohol     Types: 5 Standard drinks or equivalent per week     Comment: minimal- social - 1-2 if out wtih friends     Family History   Problem Relation Age of Onset    Eye Disorder Mother         glaucoma    Diabetes Mother     Genitourinary Problems Mother         kidney stones    LUNG DISEASE Mother         chemical work place exposure    Thyroid Disease Father     Hypertension Father     Lipids Father     Colon Cancer Father     Asthma No family hx of     C.A.D. No family hx of     Cerebrovascular Disease No family hx of     Breast Cancer No family hx of     Cancer - colorectal No family hx of     Prostate Cancer No family hx of     Alzheimer Disease No family hx of     Arthritis No family hx of     Blood Disease No family hx of     Cancer No family hx of     Cardiovascular No family hx of     Circulatory No family hx of     Gastrointestinal Disease No family hx of     Heart Disease No family hx of     Musculoskeletal Disorder No family hx of     Neurologic Disorder No family hx of     Respiratory No family hx of          Current Outpatient Medications   Medication Sig Dispense Refill    aspirin 81 MG chewable tablet Take 1 tablet (81 mg) by mouth daily 1 tablet 0    buPROPion (WELLBUTRIN XL) 150 MG 24 hr tablet Take 1 tablet (150 mg) by mouth every morning. 90 tablet 3    CALCIUM 600 + D  "600-200 MG-UNIT OR TABS  3 MONTHS 1 YEAR    CENTRUM SILVER OR TABS 1 TABLET DAILY      Cholecalciferol (VITAMIN D3) 1000 UNIT TABS Take 2 tablets by mouth daily.      hydrochlorothiazide (HYDRODIURIL) 25 MG tablet TAKE 1 TABLET BY MOUTH EVERY DAY 90 tablet 1    ibuprofen (IBU) 600 MG tablet Take 1 tablet by mouth every 6 hours as needed for pain. 60 tablet 0    insulin pen needle (32G X 4 MM) 32G X 4 MM miscellaneous Use Forteo pen needles daily as directed. 90 each 3    losartan (COZAAR) 25 MG tablet TAKE 1 TABLET BY MOUTH EVERY DAY 90 tablet 0    methocarbamol (ROBAXIN) 500 MG tablet TAKE 1-2 TABLETS (500-1,000 MG) BY MOUTH 4 TIMES DAILY AS NEEDED FOR MUSCLE SPASMS 150 tablet 0    metoprolol succinate ER (TOPROL XL) 25 MG 24 hr tablet Take 25 mg by mouth daily      multivitamin w/minerals (MULTI-VITAMIN) tablet Take by mouth daily      OMEGA-3 CF 1000 MG OR CAPS   0    omeprazole (PRILOSEC) 20 MG DR capsule Take 20 mg by mouth daily      PEPCID AC OR None Entered      rizatriptan (MAXALT) 10 MG tablet Take 1 tablet (10 mg) by mouth at onset of headache for migraine (may repeat dose in 2 hour if needed) 18 tablet 1    sertraline (ZOLOFT) 100 MG tablet Take 1.5 tablets (150 mg) by mouth daily. 135 tablet 3    teriparatide, recombinant, (FORTEO) 600 MCG/2.4ML SOPN injection Inject 0.08 mLs (20 mcg) Subcutaneous daily 2.4 mL 11    teriparatide, recombinant, (FORTEO) 600 MCG/2.4ML SOPN injection Inject 0.08 mLs (20 mcg) Subcutaneous daily 2.4 mL 11    traZODone (DESYREL) 100 MG tablet Take 1 tablet (100 mg) by mouth nightly as needed for sleep. 90 tablet 3     Allergies   Allergen Reactions    Sulfa Antibiotics Nausea and Vomiting         Review of Systems  Constitutional, HEENT, cardiovascular, pulmonary, gi and gu systems are negative, except as otherwise noted.     Objective    Exam  /74   Pulse 54   Temp 99.3  F (37.4  C) (Temporal)   Resp 12   Ht 1.676 m (5' 5.98\")   Wt 82.1 kg (181 lb)   LMP " "07/01/2005 (Exact Date)   SpO2 96%   Breastfeeding No   BMI 29.23 kg/m     Estimated body mass index is 29.23 kg/m  as calculated from the following:    Height as of this encounter: 1.676 m (5' 5.98\").    Weight as of this encounter: 82.1 kg (181 lb).    Physical Exam  GENERAL: alert and no distress  EYES: Eyes grossly normal to inspection, PERRL and conjunctivae and sclerae normal  HENT: ear canals and TM's normal, nose and mouth without ulcers or lesions  NECK: no adenopathy, no asymmetry, masses, or scars  RESP: lungs clear to auscultation - no rales, rhonchi or wheezes  BREAST: normal with implants bilaterally, without other masses, tenderness or nipple discharge and no palpable axillary masses or adenopathy  CV: regular rate and rhythm, normal S1 S2, no S3 or S4, no murmur, click or rub, no peripheral edema  ABDOMEN: soft, nontender, no hepatosplenomegaly, no masses and bowel sounds normal  MS: no gross musculoskeletal defects noted, no edema  SKIN: no suspicious lesions or rashes  NEURO: Normal strength and tone, mentation intact and speech normal  PSYCH: mentation appears normal, affect normal/bright  LYMPH: no cervical, supraclavicular, axillary, or inguinal adenopathy    Results for orders placed or performed in visit on 08/26/24   Hemoglobin A1c     Status: Normal   Result Value Ref Range    Hemoglobin A1C 5.2 0.0 - 5.6 %         Signed Electronically by: Sugar Hollingsworth PA-C    Answers submitted by the patient for this visit:  Patient Health Questionnaire (Submitted on 8/25/2024)  If you checked off any problems, how difficult have these problems made it for you to do your work, take care of things at home, or get along with other people?: Not difficult at all  PHQ9 TOTAL SCORE: 1  Patient Health Questionnaire (G7) (Submitted on 8/26/2024)  LESLIE 7 TOTAL SCORE: 0    "

## 2024-08-26 NOTE — PATIENT INSTRUCTIONS
Patient Education   Preventive Care Advice   This is general advice given by our system to help you stay healthy. However, your care team may have specific advice just for you. Please talk to your care team about your preventive care needs.  Nutrition  Eat 5 or more servings of fruits and vegetables each day.  Try wheat bread, brown rice and whole grain pasta (instead of white bread, rice, and pasta).  Get enough calcium and vitamin D. Check the label on foods and aim for 100% of the RDA (recommended daily allowance).  Lifestyle  Exercise at least 150 minutes each week  (30 minutes a day, 5 days a week).  Do muscle strengthening activities 2 days a week. These help control your weight and prevent disease.  No smoking.  Wear sunscreen to prevent skin cancer.  Have a dental exam and cleaning every 6 months.  Yearly exams  See your health care team every year to talk about:  Any changes in your health.  Any medicines your care team has prescribed.  Preventive care, family planning, and ways to prevent chronic diseases.  Shots (vaccines)   HPV shots (up to age 26), if you've never had them before.  Hepatitis B shots (up to age 59), if you've never had them before.  COVID-19 shot: Get this shot when it's due.  Flu shot: Get a flu shot every year.  Tetanus shot: Get a tetanus shot every 10 years.  Pneumococcal, hepatitis A, and RSV shots: Ask your care team if you need these based on your risk.  Shingles shot (for age 50 and up)  General health tests  Diabetes screening:  Starting at age 35, Get screened for diabetes at least every 3 years.  If you are younger than age 35, ask your care team if you should be screened for diabetes.  Cholesterol test: At age 39, start having a cholesterol test every 5 years, or more often if advised.  Bone density scan (DEXA): At age 50, ask your care team if you should have this scan for osteoporosis (brittle bones).  Hepatitis C: Get tested at least once in your life.  STIs (sexually  transmitted infections)  Before age 24: Ask your care team if you should be screened for STIs.  After age 24: Get screened for STIs if you're at risk. You are at risk for STIs (including HIV) if:  You are sexually active with more than one person.  You don't use condoms every time.  You or a partner was diagnosed with a sexually transmitted infection.  If you are at risk for HIV, ask about PrEP medicine to prevent HIV.  Get tested for HIV at least once in your life, whether you are at risk for HIV or not.  Cancer screening tests  Cervical cancer screening: If you have a cervix, begin getting regular cervical cancer screening tests starting at age 21.  Breast cancer scan (mammogram): If you've ever had breasts, begin having regular mammograms starting at age 40. This is a scan to check for breast cancer.  Colon cancer screening: It is important to start screening for colon cancer at age 45.  Have a colonoscopy test every 10 years (or more often if you're at risk) Or, ask your provider about stool tests like a FIT test every year or Cologuard test every 3 years.  To learn more about your testing options, visit:   .  For help making a decision, visit:   https://bit.ly/db39507.  Prostate cancer screening test: If you have a prostate, ask your care team if a prostate cancer screening test (PSA) at age 55 is right for you.  Lung cancer screening: If you are a current or former smoker ages 50 to 80, ask your care team if ongoing lung cancer screenings are right for you.  For informational purposes only. Not to replace the advice of your health care provider. Copyright   2023 [employer]. All rights reserved. Clinically reviewed by the [facility] Transitions Program. [facility] [id number] - REV 01/24.

## 2024-08-30 ENCOUNTER — TELEPHONE (OUTPATIENT)
Dept: FAMILY MEDICINE | Facility: CLINIC | Age: 59
End: 2024-08-30
Payer: COMMERCIAL

## 2024-08-30 NOTE — TELEPHONE ENCOUNTER
RN Triage    Patient Contact    Attempt # 1    Was call answered?  No.  Left message on voicemail with information to call me back.      Upon call back please relay  message below                 Jaylen Curry,  Your recent test results show:     Liver enzymes are normal.     Kidney function (creatinine and GFR): creatinine was mildly elevated and GFR is reduced or slower than normal.  GFR is how fast the kidneys filter (clean) the blood. Creatinine is a substance the kidneys filter and clear, so if the kidney function is down or slower than normal, the creatinine goes up.  A variety of factors affect kidney function including hydration status, age, having hypertension or diabetes or other chronic diseases, and certain medications.  This change from prior labs can be from a reversible isolated cause like dehydration, recent use/excessive use of NSAID medications (like ibuprofen, aleve, etc) however this can also indicate a pattern of declining kidney function (chronic kidney disease).  This may be due to the frequent ibuprofen you have taken for pain management for the recent compression fractures. Best to limit and avoid ibuprofen and other NSAIDs if possible. Hydrate well. Will cont to monitor.     Diabetes Screening:  Your fasting glucose results are just above normal at 104, however your A1c the 3mo test of blood sugar control is normal. You do not have diabetes but are at risk for developing diabetes.  There are steps you can take to delay the onset of diabetes and reduce your risk including:  Eliminate pop and sweetened beverages  Reduce the amount and serving sizes of carbohydrates in your diet (breads, pasta, rice, crackers, chips, candy, sweets)  Get regular exercise and lead an active lifestyle  Weight management  We will continue to monitor this trend. Plan to repeat these tests in 12 months.  Please let me know if you would like a referral to a nutritionist to learn more about preventing diabetes.    "  Interpreting Your Diabetes Screening Labs:  Fasting blood sugar or glucose:  Normal: less than 100  Prediabetes range: 100-125  Diabetes range: >/=126     Hemoglobin a1c: a measure of blood sugar control over the past 3 months:  Normal range: 4.0-5.6%  Prediabetes range: 5.7-6.4%  Diabetes range: 6.5% and higher     Cholesterol results:  LDL  Levels are moderately elevated, not quite in a range to treat with a medication. Recommend working on diet and exercise habits to improve and reduce risk.  Interpreting results:  LDL is the \"bad\" cholesterol that builds in the walls of the arteries forming plaques making them narrow. LDL cholesterol comes from animal sources like meat and dairy. The HDL is protective or \"good cholesterol\"; exercise can boost this level. Triglycerides are sugary-fats in the blood; a diet high in carbohydrates (bread, pasta, rice, chips, sweets) and alcohol raises this level. The ratio of HDL to total cholesterol under 5.0 is best.       The 10-year ASCVD risk score (Naun ARREAGA, et al., 2019) is: 4.9%    Values used to calculate the score:      Age: 58 years      Sex: Female      Is Non- : No      Diabetic: No      Tobacco smoker: No      Systolic Blood Pressure: 136 mmHg      Is BP treated: Yes      HDL Cholesterol: 62 mg/dL      Total Cholesterol: 247 mg/dL        Take care,  NORMA Dunbar RN [facility] - Registered Nurse  Clinic Triage [place]   August 30, 2024          "

## 2024-09-03 ENCOUNTER — ANCILLARY PROCEDURE (OUTPATIENT)
Dept: MAMMOGRAPHY | Facility: OTHER | Age: 59
End: 2024-09-03
Attending: PHYSICIAN ASSISTANT
Payer: COMMERCIAL

## 2024-09-03 DIAGNOSIS — Z12.31 VISIT FOR SCREENING MAMMOGRAM: ICD-10-CM

## 2024-09-03 PROCEDURE — 77063 BREAST TOMOSYNTHESIS BI: CPT | Mod: TC | Performed by: STUDENT IN AN ORGANIZED HEALTH CARE EDUCATION/TRAINING PROGRAM

## 2024-09-03 PROCEDURE — 77067 SCR MAMMO BI INCL CAD: CPT | Mod: TC | Performed by: STUDENT IN AN ORGANIZED HEALTH CARE EDUCATION/TRAINING PROGRAM

## 2024-09-03 NOTE — TELEPHONE ENCOUNTER
Seen by patient Antoinette COYLE Clayton Sherwood on 9/1/2024  6:22 PM     Ana Vargas RN  [facility] - Registered Nurse  Clinic Triage Christina   September 3, 2024

## 2024-10-29 ENCOUNTER — TRANSFERRED RECORDS (OUTPATIENT)
Dept: HEALTH INFORMATION MANAGEMENT | Facility: CLINIC | Age: 59
End: 2024-10-29
Payer: COMMERCIAL

## 2024-11-06 DIAGNOSIS — I10 BENIGN HYPERTENSION: ICD-10-CM

## 2024-11-06 RX ORDER — LOSARTAN POTASSIUM 25 MG/1
25 TABLET ORAL DAILY
Qty: 90 TABLET | Refills: 0 | Status: SHIPPED | OUTPATIENT
Start: 2024-11-06

## 2024-11-15 ENCOUNTER — OFFICE VISIT (OUTPATIENT)
Dept: FAMILY MEDICINE | Facility: CLINIC | Age: 59
End: 2024-11-15
Payer: COMMERCIAL

## 2024-11-15 VITALS
WEIGHT: 180.6 LBS | HEART RATE: 79 BPM | TEMPERATURE: 98.1 F | HEIGHT: 66 IN | RESPIRATION RATE: 18 BRPM | BODY MASS INDEX: 29.02 KG/M2 | OXYGEN SATURATION: 95 % | SYSTOLIC BLOOD PRESSURE: 133 MMHG | DIASTOLIC BLOOD PRESSURE: 83 MMHG

## 2024-11-15 DIAGNOSIS — M81.0 AGE RELATED OSTEOPOROSIS, UNSPECIFIED PATHOLOGICAL FRACTURE PRESENCE: ICD-10-CM

## 2024-11-15 DIAGNOSIS — S32.010D COMPRESSION FRACTURE OF L1 VERTEBRA WITH ROUTINE HEALING, SUBSEQUENT ENCOUNTER: ICD-10-CM

## 2024-11-15 DIAGNOSIS — K21.9 GASTROESOPHAGEAL REFLUX DISEASE WITHOUT ESOPHAGITIS: Chronic | ICD-10-CM

## 2024-11-15 DIAGNOSIS — H72.91 PERFORATION OF TYMPANIC MEMBRANE, RIGHT: ICD-10-CM

## 2024-11-15 DIAGNOSIS — F32.0 MILD MAJOR DEPRESSION (H): ICD-10-CM

## 2024-11-15 DIAGNOSIS — I10 BENIGN HYPERTENSION: ICD-10-CM

## 2024-11-15 DIAGNOSIS — Z01.818 PREOP GENERAL PHYSICAL EXAM: Primary | ICD-10-CM

## 2024-11-15 DIAGNOSIS — H90.A31 MIXED CONDUCTIVE AND SENSORINEURAL HEARING LOSS OF RIGHT EAR WITH RESTRICTED HEARING OF LEFT EAR: ICD-10-CM

## 2024-11-15 LAB
HCT VFR BLD AUTO: 43.8 % (ref 35–47)
HGB BLD-MCNC: 14.3 G/DL (ref 11.7–15.7)

## 2024-11-15 PROCEDURE — 80048 BASIC METABOLIC PNL TOTAL CA: CPT | Performed by: NURSE PRACTITIONER

## 2024-11-15 PROCEDURE — 93000 ELECTROCARDIOGRAM COMPLETE: CPT | Performed by: NURSE PRACTITIONER

## 2024-11-15 PROCEDURE — 85014 HEMATOCRIT: CPT | Performed by: NURSE PRACTITIONER

## 2024-11-15 PROCEDURE — 36415 COLL VENOUS BLD VENIPUNCTURE: CPT | Performed by: NURSE PRACTITIONER

## 2024-11-15 PROCEDURE — 85018 HEMOGLOBIN: CPT | Performed by: NURSE PRACTITIONER

## 2024-11-15 PROCEDURE — 99214 OFFICE O/P EST MOD 30 MIN: CPT | Performed by: NURSE PRACTITIONER

## 2024-11-15 ASSESSMENT — PAIN SCALES - GENERAL: PAINLEVEL_OUTOF10: NO PAIN (0)

## 2024-11-15 NOTE — PATIENT INSTRUCTIONS
How to Take Your Medication Before Surgery  Preoperative Medication Instructions   Antiplatelet or Anticoagulation Medication Instructions   - aspirin: Discontinue aspirin 7-10 days prior to procedure to reduce bleeding risk. It should be resumed postoperatively.     Additional Medication Instructions   - ACE/ARB: DO NOT TAKE on day of surgery (minimum 11 hours for general anesthesia).   - Beta Blockers: Continue taking on the day of surgery.   - Diuretics: DO NOT TAKE on the day of surgery.     - SSRIs, SNRIs, TCAs, Antipsychotics: Continue without modification.        Patient Education   Preparing for Your Surgery  For Adults  Getting started  In most cases, a nurse will call to review your health history and instructions. They will give you an arrival time based on your scheduled surgery time. Please be ready to share:  Your doctor's clinic name and phone number  Your medical, surgical, and anesthesia history  A list of allergies and sensitivities  A list of medicines, including herbal treatments and over-the-counter drugs  Whether the patient has a legal guardian (ask how to send us the papers in advance)  Note: You may not receive a call if you were seen at our PAC (Preoperative Assessment Center).  Please tell us if you're pregnant--or if there's any chance you might be pregnant. Some surgeries may injure a fetus (unborn baby), so they require a pregnancy test. Surgeries that are safe for a fetus don't always need a test, and you can choose whether to have one.   Preparing for surgery  Within 10 to 30 days of surgery: Have a pre-op exam (sometimes called an H&P, or History and Physical). This can be done at a clinic or pre-operative center.  If you're having a , you may not need this exam. Talk to your care team.  At your pre-op exam, talk to your care team about all medicines you take. (This includes CBD oil and any drugs, such as THC, marijuana, and other forms of cannabis.) If you need to stop any  medicine before surgery, ask when to start taking it again.  This is for your safety. Many medicines and drugs can make you bleed too much during surgery. Some change how well surgery (anesthesia) drugs work.  Call your insurance company to let them know you're having surgery. (If you don't have insurance, call [phone].)  Call your clinic if there's any change in your health. This includes a scrape or scratch near the surgery site, or any signs of a cold (sore throat, runny nose, cough, rash, fever).  Eating and drinking guidelines  For your safety: Unless your surgeon tells you otherwise, follow the guidelines below.  Eat and drink as normal until 8 hours before you arrive for surgery. After that, no food or milk. You can spit out gum when you arrive.  Drink clear liquids until 2 hours before you arrive. These are liquids you can see through, like water, Gatorade, and Propel Water. They also include plain black coffee and tea (no cream or milk).  No alcohol for 24 hours before you arrive. The night before surgery, stop any drinks that contain THC.  If your care team tells you to take medicine on the morning of surgery, it's okay to take it with a sip of water. No other medicines or drugs are allowed (including CBD oil)--follow your care team's instructions.  If you have questions the day of surgery, call your hospital or surgery center.   Preventing infection  Shower or bathe the night before and the morning of surgery. Follow the instructions your clinic gave you. (If no instructions, use regular soap.)  Don't shave or clip hair near your surgery site. We'll remove the hair if needed.  Don't smoke or vape the morning of surgery. No chewing tobacco for 6 hours before you arrive. A nicotine patch is okay. You may spit out nicotine gum when you arrive.  For some surgeries, the surgeon will tell you to fully quit smoking and nicotine.  We will make every effort to keep you safe from infection. We will:  Clean our  hands often with soap and water (or an alcohol-based hand rub).  Clean the skin at your surgery site with a special soap that kills germs.  Give you a special gown to keep you warm. (Cold raises the risk of infection.)  Wear hair covers, masks, gowns, and gloves during surgery.  Give antibiotic medicine, if prescribed. Not all surgeries need this medicine.  What to bring on the day of surgery  Photo ID and insurance card  Copy of your health care directive, if you have one  Glasses and hearing aids (bring cases)  You can't wear contacts during surgery  Inhaler and eye drops, if you use them (tell us about these when you arrive)  CPAP machine or breathing device, if you use them  A few personal items, if spending the night  If you have . . .  A pacemaker, ICD (cardiac defibrillator), or other implant: Bring the ID card.  An implanted stimulator: Bring the remote control.  A legal guardian: Bring a copy of the certified (court-stamped) guardianship papers.  Please remove any jewelry, including body piercings. Leave jewelry and other valuables at home.  If you're going home the day of surgery  You must have a responsible adult drive you home. They should stay with you overnight as well.  If you don't have someone to stay with you, and you aren't safe to go home alone, we may keep you overnight. Insurance often won't pay for this.  After surgery  If it's hard to control your pain or you need more pain medicine, please call your surgeon's office.  Questions?   If you have any questions for your care team, list them here:   ____________________________________________________________________________________________________________________________________________________________________________________________________________________________________________________________  For informational purposes only. Not to replace the advice of your health care provider. Copyright   2003, 2019 [facility]. All rights  reserved. Clinically reviewed by Adonay Sheppard MD. LookIt [id number] - REV 08/24.

## 2024-11-15 NOTE — PROGRESS NOTES
Preoperative Evaluation  [facility]  [address] [facility] [address]  Phone: [phone]  Primary Provider: Sugar Hollingsworth PA-C  Pre-op Performing Provider: HONG Andrea CNP  Nov 15, 2024           11/13/2024   Surgical Information   What procedure is being done? Right ear    Facility or Hospital where procedure/surgery will be performed: [facility]    Who is doing the procedure / surgery? Dr Nation    Date of surgery / procedure: 12/5/2024    Time of surgery / procedure: Morning    Where do you plan to recover after surgery? at home with family        Patient-reported     Fax number for surgical facility: [phone]    Assessment & Plan     The proposed surgical procedure is considered INTERMEDIATE risk.    Preop general physical exam    - EKG 12-lead complete w/read - Clinics  - Hemoglobin and hematocrit; Future  - Basic metabolic panel  (Ca, Cl, CO2, Creat, Gluc, K, Na, BUN); Future      Perforation of tympanic membrane, right  Mixed conductive and sensorineural hearing loss of right ear with restricted hearing of left ear  -with plans for surgical repair    Benign hypertension  -well controlled on losartan and hydrochlorothiazide     Mild major depression (H)  -doing well on bupropion and sertraline     Gastroesophageal reflux disease without esophagitis  -currently asymptomatic on omeprazole    Compression fracture of L1 vertebra with routine healing, subsequent encounter  Age related osteoporosis, unspecified pathological fracture presence  -on calcium and vitamin D supplementation and Forteo injections,.            Risks and Recommendations  The patient has the following additional risks and recommendations for perioperative complications:   - No identified additional risk factors other than previously addressed    Preoperative Medication Instructions  Antiplatelet or Anticoagulation Medication Instructions   - aspirin: Discontinue aspirin 7-10 days prior to  procedure to reduce bleeding risk. It should be resumed postoperatively.     Additional Medication Instructions   - ACE/ARB: DO NOT TAKE on day of surgery (minimum 11 hours for general anesthesia).   - Beta Blockers: Continue taking on the day of surgery.   - Diuretics: DO NOT TAKE on the day of surgery.     - SSRIs, SNRIs, TCAs, Antipsychotics: Continue without modification.     Recommendation  Approval given to proceed with proposed procedure, without further diagnostic evaluation.    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 No    Have you ever had surgery on your heart or blood vessels, such as a stent placement, a coronary artery bypass, or surgery on an artery in your head, neck, heart, or legs? No    Do you have chest pain with activity? No    Do you have a history of heart failure? No    Do you currently have a cold, bronchitis or symptoms of other infection? No    Do you have a cough, shortness of breath, or wheezing? No    Do you or anyone in your family have previous history of blood clots? No    Do you or does anyone in your family have a serious bleeding problem such as prolonged bleeding following surgeries or cuts? No    Have you ever had problems with anemia or been told to take iron pills? No    Have you had any abnormal blood loss such as black, tarry or bloody stools, or abnormal vaginal bleeding? No    Have you ever had a blood transfusion? No    Are you willing to have a blood transfusion if it is medically needed before, during, or after your surgery? Yes     Have you or any of your relatives ever had problems with anesthesia? No    Do you have sleep apnea, excessive snoring or daytime drowsiness? No    Do you have any artifical heart valves or other implanted medical devices like a pacemaker, defibrillator, or continuous glucose monitor? No    Do you have artificial joints? No    Are you allergic to latex? No        Patient-reported     Health Care Directive  Patient does not have a Health Care Directive: Discussed advance care planning with patient; however, patient declined at this time.    Preoperative Review of    reviewed - no record of controlled substances prescribed.          Patient Active Problem List    Diagnosis Date Noted    Trochanteric bursitis of right hip 05/26/2022     Priority: Medium    Gastroesophageal reflux disease without esophagitis 05/26/2022     Priority: Medium    Impaired fasting glucose 05/26/2022     Priority: Medium    H/O coccidioidomycosis 08/07/2018     Priority: Medium    Migraine with aura and without status migrainosus, not intractable 10/12/2015     Priority: Medium    Pain of left lower leg 06/09/2015     Priority: Medium    Benign hypertension 03/03/2014     Priority: Medium    Mild major depression (H) 04/18/2012     Priority: Medium    CARDIOVASCULAR SCREENING; LDL GOAL LESS THAN 160 02/10/2010     Priority: Medium      Past Medical History:   Diagnosis Date    Anxiety     Calculus of kidney     Abstracted 07/01/02    Depressive disorder     Hypertension      Past Surgical History:   Procedure Laterality Date    [facility] AFF SURGICAL PATHOLOGY  01/01/2006    breast reduction    COLONOSCOPY      COSMETIC SURGERY      ENT SURGERY      EXCISE LESION TRUNK  11/21/2012    Procedure: EXCISE LESION TRUNK;  Excision back mass;  Surgeon: Saqib Abraham MD;  Location: [facility] OR    HC CORRECT BUNION,METATARSAL OSTEOTOMY  12/03/2009    right great toe    HC EXCISION LESION/TENDON-SHEATH/CAPSULE, FOOT  12/03/2009    HC REMV  TARSAL/METATARSAL BENIGN BONE LESN  12/03/2009    HYSTERECTOMY, PAP NO LONGER INDICATED      HYSTERECTOMY, VAGINAL  01/01/2005    menorrhagia, normal paps    LAPAROSCOPIC CHOLECYSTECTOMY  01/01/2001    LITHOTRIPSY  01/01/2007    right kidney    TUBAL LIGATION  01/01/2001    [facility] NONSPECIFIC PROCEDURE  08/01/1996    Left salpingectomy   Abstracted 07/01/02     Current Outpatient Medications   Medication Sig Dispense Refill    aspirin 81 MG chewable tablet Take 1 tablet (81 mg) by mouth daily 1 tablet 0    buPROPion (WELLBUTRIN XL) 150 MG 24 hr tablet Take 1 tablet (150 mg) by mouth every morning. 90 tablet 3    CALCIUM 600 + D 600-200 MG-UNIT OR TABS  3 MONTHS 1 YEAR    CENTRUM SILVER OR TABS 1 TABLET DAILY      Cholecalciferol (VITAMIN D3) 1000 UNIT TABS Take 2 tablets by mouth daily.      hydrochlorothiazide (HYDRODIURIL) 25 MG tablet TAKE 1 TABLET BY MOUTH EVERY DAY 90 tablet 1    ibuprofen (IBU) 600 MG tablet Take 1 tablet by mouth every 6 hours as needed for pain. 60 tablet 0    insulin pen needle (32G X 4 MM) 32G X 4 MM miscellaneous Use Forteo pen needles daily as directed. 90 each 3    losartan (COZAAR) 25 MG tablet TAKE 1 TABLET BY MOUTH EVERY DAY 90 tablet 0    methocarbamol (ROBAXIN) 500 MG tablet TAKE 1-2 TABLETS (500-1,000 MG) BY MOUTH 4 TIMES DAILY AS NEEDED FOR MUSCLE SPASMS 150 tablet 0    metoprolol succinate ER (TOPROL XL) 25 MG 24 hr tablet Take 1 tablet (25 mg) by mouth daily. 90 tablet 3    multivitamin w/minerals (MULTI-VITAMIN) tablet Take by mouth daily      OMEGA-3 CF 1000 MG OR CAPS   0    omeprazole (PRILOSEC) 20 MG DR capsule Take 20 mg by mouth daily      rizatriptan (MAXALT) 10 MG tablet Take 1 tablet (10 mg) by mouth at onset of headache for migraine (may repeat dose in 2 hour if needed). 9 tablet 1    sertraline (ZOLOFT) 100 MG tablet Take 1.5 tablets (150 mg) by mouth daily. 135 tablet 3    teriparatide, recombinant, (FORTEO) 600 MCG/2.4ML SOPN injection Inject 0.08 mLs (20 mcg)  "Subcutaneous daily 2.4 mL 11    traZODone (DESYREL) 100 MG tablet Take 1 tablet (100 mg) by mouth nightly as needed for sleep. 90 tablet 3       Allergies   Allergen Reactions    Sulfa Antibiotics Nausea and Vomiting        Social History     Tobacco Use    Smoking status: Never     Passive exposure: Never    Smokeless tobacco: Never   Substance Use Topics    Alcohol use: Yes     Alcohol/week: 5.0 standard drinks of alcohol     Types: 5 Standard drinks or equivalent per week     Comment: minimal- social - 1-2 if out wtih friends       History   Drug Use No               Objective    /83   Pulse 79   Temp 98.1  F (36.7  C) (Tympanic)   Resp 18   Ht 1.676 m (5' 5.98\")   Wt 81.9 kg (180 lb 9.6 oz)   LMP 07/01/2005 (Exact Date)   SpO2 95%   BMI 29.17 kg/m     Estimated body mass index is 29.17 kg/m  as calculated from the following:    Height as of this encounter: 1.676 m (5' 5.98\").    Weight as of this encounter: 81.9 kg (180 lb 9.6 oz).  Physical Exam  GENERAL: alert and no distress  EYES: Eyes grossly normal to inspection, PERRL and conjunctivae and sclerae normal  HENT:  nose and mouth without ulcers or lesions  NECK: no adenopathy, no asymmetry, masses, or scars  RESP: lungs clear to auscultation - no rales, rhonchi or wheezes  CV: regular rate and rhythm, normal S1 S2, no S3 or S4, no murmur, click or rub, no peripheral edema  ABDOMEN: soft, nontender, no hepatosplenomegaly, no masses and bowel sounds normal. Bruising from forteo injections.   MS: no gross musculoskeletal defects noted, no edema  SKIN: no suspicious lesions or rashes  NEURO: Normal strength and tone, mentation intact and speech normal  PSYCH: mentation appears normal, affect normal/bright         Diagnostics  Recent Results (from the past 720 hours)   Hemoglobin and hematocrit    Collection Time: 11/15/24  1:44 PM   Result Value Ref Range    Hemoglobin 14.3 11.7 - 15.7 g/dL    Hematocrit 43.8 35.0 - 47.0 %   Basic metabolic panel  " (Ca, Cl, CO2, Creat, Gluc, K, Na, BUN)    Collection Time: 11/15/24  1:44 PM   Result Value Ref Range    Sodium 142 135 - 145 mmol/L    Potassium 3.6 3.4 - 5.3 mmol/L    Chloride 103 98 - 107 mmol/L    Carbon Dioxide (CO2) 27 22 - 29 mmol/L    Anion Gap 12 7 - 15 mmol/L    Urea Nitrogen 23.9 (H) 8.0 - 23.0 mg/dL    Creatinine 0.95 0.51 - 0.95 mg/dL    GFR Estimate 69 >60 mL/min/1.73m2    Calcium 10.0 8.8 - 10.4 mg/dL    Glucose 128 (H) 70 - 99 mg/dL      EKG: appears normal, NSR, normal axis, normal intervals, no acute ST/T changes c/w ischemia, no LVH by voltage criteria, unchanged from previous tracings    Revised Cardiac Risk Index (RCRI)  The patient has the following serious cardiovascular risks for perioperative complications:   - No serious cardiac risks = 0 points     RCRI Interpretation: 0 points: Class I (very low risk - 0.4% complication rate)         Signed Electronically by: HONG Andrea CNP  A copy of this evaluation report is provided to the requesting physician.

## 2024-11-16 LAB
ANION GAP SERPL CALCULATED.3IONS-SCNC: 12 MMOL/L (ref 7–15)
BUN SERPL-MCNC: 23.9 MG/DL (ref 8–23)
CALCIUM SERPL-MCNC: 10 MG/DL (ref 8.8–10.4)
CHLORIDE SERPL-SCNC: 103 MMOL/L (ref 98–107)
CREAT SERPL-MCNC: 0.95 MG/DL (ref 0.51–0.95)
EGFRCR SERPLBLD CKD-EPI 2021: 69 ML/MIN/1.73M2
GLUCOSE SERPL-MCNC: 128 MG/DL (ref 70–99)
HCO3 SERPL-SCNC: 27 MMOL/L (ref 22–29)
POTASSIUM SERPL-SCNC: 3.6 MMOL/L (ref 3.4–5.3)
SODIUM SERPL-SCNC: 142 MMOL/L (ref 135–145)

## 2024-11-18 NOTE — PROGRESS NOTES
Faxed pre op to [facility]- attn Dr Nation @ [phone]  Thank you,  Ursula RAO    [phone]

## 2024-12-17 ENCOUNTER — TRANSFERRED RECORDS (OUTPATIENT)
Dept: HEALTH INFORMATION MANAGEMENT | Facility: CLINIC | Age: 59
End: 2024-12-17
Payer: COMMERCIAL

## 2025-01-03 DIAGNOSIS — I10 BENIGN HYPERTENSION: ICD-10-CM

## 2025-01-06 RX ORDER — HYDROCHLOROTHIAZIDE 25 MG/1
25 TABLET ORAL DAILY
Qty: 90 TABLET | Refills: 1 | Status: SHIPPED | OUTPATIENT
Start: 2025-01-06

## 2025-01-06 RX ORDER — LOSARTAN POTASSIUM 25 MG/1
25 TABLET ORAL DAILY
Qty: 90 TABLET | Refills: 1 | Status: SHIPPED | OUTPATIENT
Start: 2025-01-06

## 2025-03-18 ENCOUNTER — TRANSFERRED RECORDS (OUTPATIENT)
Dept: HEALTH INFORMATION MANAGEMENT | Facility: CLINIC | Age: 60
End: 2025-03-18
Payer: COMMERCIAL

## 2025-05-13 ENCOUNTER — TRANSFERRED RECORDS (OUTPATIENT)
Dept: HEALTH INFORMATION MANAGEMENT | Facility: CLINIC | Age: 60
End: 2025-05-13
Payer: COMMERCIAL

## 2025-06-06 DIAGNOSIS — M81.0 AGE RELATED OSTEOPOROSIS, UNSPECIFIED PATHOLOGICAL FRACTURE PRESENCE: ICD-10-CM

## 2025-06-06 DIAGNOSIS — S32.010D COMPRESSION FRACTURE OF L1 VERTEBRA WITH ROUTINE HEALING, SUBSEQUENT ENCOUNTER: ICD-10-CM

## 2025-06-09 NOTE — TELEPHONE ENCOUNTER
teriparatide, recombinant, (FORTEO) 600 MCG/2.4ML SOPN injection   Start: 06/26/2024   Disp 2.4 ml   R 11  Inject 0.08 mLs (20 mcg) Subcutaneous daily     Bella Reina MD  Sports Medicine     6/7/24  Nv none    Needs appt.    Refill decision: Medication unable to be refilled by RN due to: Medication not included in refill protocol policy Not on protocol.needs appt    Request from pharmacy:  Requested Prescriptions   Pending Prescriptions Disp Refills    teriparatide (recombinant) (FORTEO) 560 MCG/2.24ML SOPN injection [Pharmacy Med Name: TERIPARATIDE 560MCG/2.24ML PEN] 2.24 mL      Sig: INJECT 20 MCG (0.08 ML) UNDER THE SKIN ONCE A DAY (DISCARD PEN 28 DAYS AFTER INITIAL USE)       There is no refill protocol information for this order

## 2025-06-10 RX ORDER — TERIPARATIDE 250 UG/ML
INJECTION, SOLUTION SUBCUTANEOUS
Qty: 2.24 ML | Refills: 2 | Status: SHIPPED | OUTPATIENT
Start: 2025-06-10

## 2025-06-22 DIAGNOSIS — I10 BENIGN HYPERTENSION: ICD-10-CM

## 2025-06-23 RX ORDER — HYDROCHLOROTHIAZIDE 25 MG/1
25 TABLET ORAL DAILY
Qty: 90 TABLET | Refills: 1 | Status: SHIPPED | OUTPATIENT
Start: 2025-06-23

## 2025-07-28 ENCOUNTER — PATIENT OUTREACH (OUTPATIENT)
Dept: CARE COORDINATION | Facility: CLINIC | Age: 60
End: 2025-07-28
Payer: COMMERCIAL

## 2025-08-04 ENCOUNTER — PATIENT OUTREACH (OUTPATIENT)
Dept: CARE COORDINATION | Facility: CLINIC | Age: 60
End: 2025-08-04
Payer: COMMERCIAL

## 2025-08-11 ENCOUNTER — PATIENT OUTREACH (OUTPATIENT)
Dept: CARE COORDINATION | Facility: CLINIC | Age: 60
End: 2025-08-11
Payer: COMMERCIAL

## 2025-08-28 ENCOUNTER — MYC MEDICAL ADVICE (OUTPATIENT)
Dept: FAMILY MEDICINE | Facility: CLINIC | Age: 60
End: 2025-08-28
Payer: COMMERCIAL